# Patient Record
Sex: MALE | Race: WHITE | Employment: FULL TIME | URBAN - METROPOLITAN AREA
[De-identification: names, ages, dates, MRNs, and addresses within clinical notes are randomized per-mention and may not be internally consistent; named-entity substitution may affect disease eponyms.]

---

## 2017-02-27 DIAGNOSIS — I10 ESSENTIAL HYPERTENSION: ICD-10-CM

## 2017-02-28 RX ORDER — HYDROCHLOROTHIAZIDE 25 MG/1
TABLET ORAL
Qty: 90 TABLET | Refills: 0 | Status: SHIPPED | OUTPATIENT
Start: 2017-02-28 | End: 2017-04-03 | Stop reason: SDUPTHER

## 2017-03-20 RX ORDER — INSULIN LISPRO 100 [IU]/ML
INJECTION, SUSPENSION SUBCUTANEOUS
Qty: 30 ML | Refills: 2 | Status: SHIPPED | OUTPATIENT
Start: 2017-03-20 | End: 2017-05-12 | Stop reason: SDUPTHER

## 2017-04-03 DIAGNOSIS — I10 ESSENTIAL HYPERTENSION: ICD-10-CM

## 2017-04-03 RX ORDER — HYDROCHLOROTHIAZIDE 25 MG/1
TABLET ORAL
Qty: 90 TABLET | Refills: 1 | Status: SHIPPED | OUTPATIENT
Start: 2017-04-03 | End: 2017-06-02 | Stop reason: SDUPTHER

## 2017-05-12 ENCOUNTER — OFFICE VISIT (OUTPATIENT)
Dept: SURGERY | Age: 52
End: 2017-05-12

## 2017-05-12 VITALS
HEIGHT: 74 IN | WEIGHT: 245 LBS | SYSTOLIC BLOOD PRESSURE: 154 MMHG | DIASTOLIC BLOOD PRESSURE: 98 MMHG | HEART RATE: 109 BPM | BODY MASS INDEX: 31.44 KG/M2

## 2017-05-12 DIAGNOSIS — I10 ESSENTIAL HYPERTENSION: ICD-10-CM

## 2017-05-12 DIAGNOSIS — M10.9 GOUT, UNSPECIFIED CAUSE, UNSPECIFIED CHRONICITY, UNSPECIFIED SITE: ICD-10-CM

## 2017-05-12 DIAGNOSIS — E78.49 OTHER HYPERLIPIDEMIA: Primary | ICD-10-CM

## 2017-05-12 LAB — GLUCOSE BLD-MCNC: 338 MG/DL

## 2017-05-12 PROCEDURE — 3017F COLORECTAL CA SCREEN DOC REV: CPT | Performed by: INTERNAL MEDICINE

## 2017-05-12 PROCEDURE — 1036F TOBACCO NON-USER: CPT | Performed by: INTERNAL MEDICINE

## 2017-05-12 PROCEDURE — 82962 GLUCOSE BLOOD TEST: CPT | Performed by: INTERNAL MEDICINE

## 2017-05-12 PROCEDURE — G8427 DOCREV CUR MEDS BY ELIG CLIN: HCPCS | Performed by: INTERNAL MEDICINE

## 2017-05-12 PROCEDURE — 3045F PR MOST RECENT HEMOGLOBIN A1C LEVEL 7.0-9.0%: CPT | Performed by: INTERNAL MEDICINE

## 2017-05-12 PROCEDURE — 99214 OFFICE O/P EST MOD 30 MIN: CPT | Performed by: INTERNAL MEDICINE

## 2017-05-12 PROCEDURE — G8417 CALC BMI ABV UP PARAM F/U: HCPCS | Performed by: INTERNAL MEDICINE

## 2017-05-12 RX ORDER — OMEGA-3-ACID ETHYL ESTERS 1 G/1
2 CAPSULE, LIQUID FILLED ORAL 2 TIMES DAILY
Qty: 360 CAPSULE | Refills: 3 | Status: SHIPPED | OUTPATIENT
Start: 2017-05-12 | End: 2019-01-04 | Stop reason: SDUPTHER

## 2017-05-12 RX ORDER — INSULIN LISPRO 100 [IU]/ML
INJECTION, SUSPENSION SUBCUTANEOUS
Qty: 90 PEN | Refills: 3 | Status: SHIPPED | OUTPATIENT
Start: 2017-05-12 | End: 2017-07-14 | Stop reason: SDUPTHER

## 2017-05-12 ASSESSMENT — ENCOUNTER SYMPTOMS: EYES NEGATIVE: 1

## 2017-06-02 DIAGNOSIS — I10 ESSENTIAL HYPERTENSION: ICD-10-CM

## 2017-06-02 RX ORDER — BENAZEPRIL HYDROCHLORIDE 20 MG/1
TABLET ORAL
Qty: 90 TABLET | Refills: 0 | Status: SHIPPED | OUTPATIENT
Start: 2017-06-02 | End: 2017-09-08 | Stop reason: SDUPTHER

## 2017-06-02 RX ORDER — HYDROCHLOROTHIAZIDE 25 MG/1
TABLET ORAL
Qty: 90 TABLET | Refills: 0 | Status: SHIPPED | OUTPATIENT
Start: 2017-06-02 | End: 2017-08-31 | Stop reason: SDUPTHER

## 2017-06-02 RX ORDER — ATORVASTATIN CALCIUM 40 MG/1
TABLET, FILM COATED ORAL
Qty: 90 TABLET | Refills: 1 | Status: SHIPPED | OUTPATIENT
Start: 2017-06-02 | End: 2017-08-31 | Stop reason: SDUPTHER

## 2017-07-14 RX ORDER — INSULIN LISPRO 100 [IU]/ML
INJECTION, SUSPENSION SUBCUTANEOUS
Qty: 90 ML | Refills: 3 | Status: SHIPPED | OUTPATIENT
Start: 2017-07-14 | End: 2019-01-04 | Stop reason: SDUPTHER

## 2017-08-31 DIAGNOSIS — I10 ESSENTIAL HYPERTENSION: ICD-10-CM

## 2017-08-31 RX ORDER — ATORVASTATIN CALCIUM 40 MG/1
TABLET, FILM COATED ORAL
Qty: 90 TABLET | Refills: 0 | Status: SHIPPED | OUTPATIENT
Start: 2017-08-31 | End: 2017-12-13 | Stop reason: SDUPTHER

## 2017-08-31 RX ORDER — HYDROCHLOROTHIAZIDE 25 MG/1
TABLET ORAL
Qty: 90 TABLET | Refills: 0 | Status: SHIPPED | OUTPATIENT
Start: 2017-08-31 | End: 2017-12-13 | Stop reason: SDUPTHER

## 2017-12-13 DIAGNOSIS — I10 ESSENTIAL HYPERTENSION: ICD-10-CM

## 2017-12-13 RX ORDER — ATORVASTATIN CALCIUM 40 MG/1
TABLET, FILM COATED ORAL
Qty: 90 TABLET | Refills: 1 | Status: SHIPPED | OUTPATIENT
Start: 2017-12-13 | End: 2018-06-15 | Stop reason: SDUPTHER

## 2017-12-13 RX ORDER — HYDROCHLOROTHIAZIDE 25 MG/1
TABLET ORAL
Qty: 90 TABLET | Refills: 1 | Status: SHIPPED | OUTPATIENT
Start: 2017-12-13 | End: 2018-06-15 | Stop reason: SDUPTHER

## 2018-06-15 DIAGNOSIS — I10 ESSENTIAL HYPERTENSION: ICD-10-CM

## 2018-06-15 RX ORDER — HYDROCHLOROTHIAZIDE 25 MG/1
TABLET ORAL
Qty: 90 TABLET | Refills: 1 | Status: SHIPPED | OUTPATIENT
Start: 2018-06-15 | End: 2019-01-04 | Stop reason: SDUPTHER

## 2018-06-15 RX ORDER — ATORVASTATIN CALCIUM 40 MG/1
TABLET, FILM COATED ORAL
Qty: 90 TABLET | Refills: 1 | Status: SHIPPED | OUTPATIENT
Start: 2018-06-15 | End: 2019-01-04 | Stop reason: SDUPTHER

## 2018-06-24 DIAGNOSIS — I10 ESSENTIAL HYPERTENSION: ICD-10-CM

## 2018-06-25 RX ORDER — BENAZEPRIL HYDROCHLORIDE 20 MG/1
TABLET ORAL
Qty: 90 TABLET | Refills: 0 | Status: SHIPPED | OUTPATIENT
Start: 2018-06-25 | End: 2019-01-04 | Stop reason: SDUPTHER

## 2019-01-04 ENCOUNTER — OFFICE VISIT (OUTPATIENT)
Dept: ENDOCRINOLOGY | Age: 54
End: 2019-01-04
Payer: COMMERCIAL

## 2019-01-04 VITALS
HEIGHT: 74 IN | OXYGEN SATURATION: 96 % | SYSTOLIC BLOOD PRESSURE: 145 MMHG | DIASTOLIC BLOOD PRESSURE: 88 MMHG | HEART RATE: 94 BPM | BODY MASS INDEX: 31.57 KG/M2 | WEIGHT: 246 LBS

## 2019-01-04 DIAGNOSIS — I10 ESSENTIAL HYPERTENSION: ICD-10-CM

## 2019-01-04 DIAGNOSIS — E78.49 OTHER HYPERLIPIDEMIA: Primary | ICD-10-CM

## 2019-01-04 DIAGNOSIS — M10.9 GOUT, UNSPECIFIED CAUSE, UNSPECIFIED CHRONICITY, UNSPECIFIED SITE: ICD-10-CM

## 2019-01-04 LAB
GLUCOSE BLD-MCNC: 326 MG/DL
HBA1C MFR BLD: 14 %

## 2019-01-04 PROCEDURE — 83036 HEMOGLOBIN GLYCOSYLATED A1C: CPT | Performed by: INTERNAL MEDICINE

## 2019-01-04 PROCEDURE — 99214 OFFICE O/P EST MOD 30 MIN: CPT | Performed by: INTERNAL MEDICINE

## 2019-01-04 PROCEDURE — 82962 GLUCOSE BLOOD TEST: CPT | Performed by: INTERNAL MEDICINE

## 2019-01-04 RX ORDER — OMEGA-3-ACID ETHYL ESTERS 1 G/1
2 CAPSULE, LIQUID FILLED ORAL 2 TIMES DAILY
Qty: 360 CAPSULE | Refills: 3 | Status: SHIPPED | OUTPATIENT
Start: 2019-01-04

## 2019-01-04 RX ORDER — BENAZEPRIL HYDROCHLORIDE 20 MG/1
20 TABLET ORAL DAILY
Qty: 30 TABLET | Refills: 3 | Status: CANCELLED | OUTPATIENT
Start: 2019-01-04 | End: 2020-01-04

## 2019-01-04 RX ORDER — ALLOPURINOL 300 MG/1
300 TABLET ORAL DAILY
Qty: 90 TABLET | Refills: 1 | Status: SHIPPED | OUTPATIENT
Start: 2019-01-04 | End: 2019-06-28 | Stop reason: SDUPTHER

## 2019-01-04 RX ORDER — HYDROCHLOROTHIAZIDE 25 MG/1
TABLET ORAL
Qty: 90 TABLET | Refills: 1 | Status: SHIPPED | OUTPATIENT
Start: 2019-01-04 | End: 2019-07-12 | Stop reason: SDUPTHER

## 2019-01-04 RX ORDER — INSULIN LISPRO 100 [IU]/ML
INJECTION, SUSPENSION SUBCUTANEOUS
Qty: 90 ML | Refills: 3 | Status: SHIPPED | OUTPATIENT
Start: 2019-01-04 | End: 2019-05-24 | Stop reason: SDUPTHER

## 2019-01-04 RX ORDER — HYDROCHLOROTHIAZIDE 25 MG/1
25 TABLET ORAL DAILY
Qty: 30 TABLET | Refills: 3 | Status: SHIPPED | OUTPATIENT
Start: 2019-01-04 | End: 2021-06-04 | Stop reason: SDUPTHER

## 2019-01-04 RX ORDER — ATORVASTATIN CALCIUM 40 MG/1
TABLET, FILM COATED ORAL
Qty: 90 TABLET | Refills: 1 | Status: SHIPPED | OUTPATIENT
Start: 2019-01-04 | End: 2019-07-12 | Stop reason: SDUPTHER

## 2019-01-04 RX ORDER — BENAZEPRIL HYDROCHLORIDE 20 MG/1
TABLET ORAL
Qty: 90 TABLET | Refills: 3 | Status: SHIPPED | OUTPATIENT
Start: 2019-01-04 | End: 2020-01-08

## 2019-05-24 ENCOUNTER — OFFICE VISIT (OUTPATIENT)
Dept: ENDOCRINOLOGY | Age: 54
End: 2019-05-24
Payer: COMMERCIAL

## 2019-05-24 VITALS
DIASTOLIC BLOOD PRESSURE: 78 MMHG | BODY MASS INDEX: 30.93 KG/M2 | HEART RATE: 114 BPM | HEIGHT: 74 IN | WEIGHT: 241 LBS | SYSTOLIC BLOOD PRESSURE: 115 MMHG

## 2019-05-24 DIAGNOSIS — R53.83 FATIGUE, UNSPECIFIED TYPE: ICD-10-CM

## 2019-05-24 LAB
CHP ED QC CHECK: NORMAL
GLUCOSE BLD-MCNC: 215 MG/DL
HBA1C MFR BLD: 10.2 %

## 2019-05-24 PROCEDURE — 3017F COLORECTAL CA SCREEN DOC REV: CPT | Performed by: INTERNAL MEDICINE

## 2019-05-24 PROCEDURE — 2022F DILAT RTA XM EVC RTNOPTHY: CPT | Performed by: INTERNAL MEDICINE

## 2019-05-24 PROCEDURE — 83036 HEMOGLOBIN GLYCOSYLATED A1C: CPT | Performed by: INTERNAL MEDICINE

## 2019-05-24 PROCEDURE — 4004F PT TOBACCO SCREEN RCVD TLK: CPT | Performed by: INTERNAL MEDICINE

## 2019-05-24 PROCEDURE — 99213 OFFICE O/P EST LOW 20 MIN: CPT | Performed by: INTERNAL MEDICINE

## 2019-05-24 PROCEDURE — 82962 GLUCOSE BLOOD TEST: CPT | Performed by: INTERNAL MEDICINE

## 2019-05-24 PROCEDURE — G8427 DOCREV CUR MEDS BY ELIG CLIN: HCPCS | Performed by: INTERNAL MEDICINE

## 2019-05-24 PROCEDURE — G8417 CALC BMI ABV UP PARAM F/U: HCPCS | Performed by: INTERNAL MEDICINE

## 2019-05-24 PROCEDURE — 3046F HEMOGLOBIN A1C LEVEL >9.0%: CPT | Performed by: INTERNAL MEDICINE

## 2019-05-24 RX ORDER — INSULIN LISPRO 100 [IU]/ML
INJECTION, SUSPENSION SUBCUTANEOUS
Qty: 90 ML | Refills: 3
Start: 2019-05-24 | End: 2019-08-26 | Stop reason: SDUPTHER

## 2019-05-24 RX ORDER — GABAPENTIN 300 MG/1
300 CAPSULE ORAL 3 TIMES DAILY
Qty: 90 CAPSULE | Refills: 3 | Status: SHIPPED | OUTPATIENT
Start: 2019-05-24 | End: 2019-08-03 | Stop reason: SDUPTHER

## 2019-06-02 NOTE — PROGRESS NOTES
Subjective:      Patient ID: Ana Maria Juarez is a 49 y.o. male. 3 month f/u on diabetes   Diabetes   He presents for his follow-up diabetic visit. He has type 2 diabetes mellitus. Associated symptoms include fatigue. Diabetic complications include peripheral neuropathy. Current diabetic treatment includes insulin injections (75/25 humalog plus  metfromin ). He is currently taking insulin pre-breakfast, pre-dinner and pre-lunch. His overall blood glucose range is >200 mg/dl. (  Lab Results       Component                Value               Date                       LABA1C                   10.2                05/24/2019            ) An ACE inhibitor/angiotensin II receptor blocker is being taken. C/o neuropathic pain   C/o fatigue wants testosterone  level checked     Patient Active Problem List   Diagnosis    Type II diabetes mellitus, uncontrolled ([facility] [address]    Hypertension    Hyperlipidemia    Focal nodular hyperplasia of liver    Gout     Allergies   Allergen Reactions    Dye [Barium-Containing Compounds] Hives             Current Outpatient Medications:     insulin lispro protamine & lispro (HUMALOG MIX 75/25 KWIKPEN) (75-25) 100 UNIT per ML SUPN injection pen, 50 units am 30 units lunch and 50 units at dinner, Disp: 90 mL, Rfl: 3    gabapentin (NEURONTIN) 300 MG capsule, Take 1 capsule by mouth 3 times daily for 180 days.  Intended supply: 30 days, Disp: 90 capsule, Rfl: 3    atorvastatin (LIPITOR) 40 MG tablet, TAKE 1 TABLET DAILY, Disp: 90 tablet, Rfl: 1    omega-3 acid ethyl esters (LOVAZA) 1 g capsule, Take 2 capsules by mouth 2 times daily, Disp: 360 capsule, Rfl: 3    allopurinol (ZYLOPRIM) 300 MG tablet, Take 1 tablet by mouth daily, Disp: 90 tablet, Rfl: 1    Insulin Pen Needle (NOVOFINE) 32G X 6 MM MISC, Use twice daily with insulin, Disp: 300 each, Rfl: 3    hydrochlorothiazide (HYDRODIURIL) 25 MG tablet, Take 1 tablet by mouth daily, Disp: 30 tablet, Rfl: 3    benazepril (LOTENSIN) 20 MG Status:   Future     Standing Expiration Date:   2020    Basic Metabolic Panel     Standing Status:   Future     Standing Expiration Date:   2020    POCT Glucose    POCT glycosylated hemoglobin (Hb A1C)     increase insulin dose   Orders Placed This Encounter   Medications    insulin lispro protamine & lispro (HUMALOG MIX 75/25 KWIKPEN) (75-25) 100 UNIT per ML SUPN injection pen     Si units am 30 units lunch and 50 units at dinner     Dispense:  90 mL     Refill:  3    gabapentin (NEURONTIN) 300 MG capsule     Sig: Take 1 capsule by mouth 3 times daily for 180 days.  Intended supply: 30 days     Dispense:  90 capsule     Refill:  3

## 2019-06-28 RX ORDER — ALLOPURINOL 300 MG/1
TABLET ORAL
Qty: 90 TABLET | Refills: 1 | Status: SHIPPED | OUTPATIENT
Start: 2019-06-28 | End: 2020-01-08

## 2019-07-12 DIAGNOSIS — I10 ESSENTIAL HYPERTENSION: ICD-10-CM

## 2019-07-12 RX ORDER — HYDROCHLOROTHIAZIDE 25 MG/1
TABLET ORAL
Qty: 90 TABLET | Refills: 1 | Status: SHIPPED | OUTPATIENT
Start: 2019-07-12 | End: 2020-01-08

## 2019-07-12 RX ORDER — ATORVASTATIN CALCIUM 40 MG/1
TABLET, FILM COATED ORAL
Qty: 90 TABLET | Refills: 1 | Status: SHIPPED | OUTPATIENT
Start: 2019-07-12 | End: 2020-01-08

## 2019-08-05 RX ORDER — GABAPENTIN 300 MG/1
CAPSULE ORAL
Qty: 90 CAPSULE | Refills: 2 | Status: SHIPPED | OUTPATIENT
Start: 2019-08-05 | End: 2019-11-30 | Stop reason: SDUPTHER

## 2019-08-26 ENCOUNTER — OFFICE VISIT (OUTPATIENT)
Dept: ENDOCRINOLOGY | Age: 54
End: 2019-08-26
Payer: COMMERCIAL

## 2019-08-26 VITALS
DIASTOLIC BLOOD PRESSURE: 87 MMHG | SYSTOLIC BLOOD PRESSURE: 132 MMHG | HEIGHT: 74 IN | BODY MASS INDEX: 31.18 KG/M2 | HEART RATE: 92 BPM | WEIGHT: 243 LBS

## 2019-08-26 DIAGNOSIS — R53.83 FATIGUE, UNSPECIFIED TYPE: ICD-10-CM

## 2019-08-26 DIAGNOSIS — E29.1 HYPOGONADISM IN MALE: ICD-10-CM

## 2019-08-26 LAB
ANION GAP SERPL CALCULATED.3IONS-SCNC: 15 MEQ/L (ref 9–15)
BUN BLDV-MCNC: 14 MG/DL (ref 6–20)
CALCIUM SERPL-MCNC: 10 MG/DL (ref 8.5–9.9)
CHLORIDE BLD-SCNC: 95 MEQ/L (ref 95–107)
CHP ED QC CHECK: NORMAL
CO2: 26 MEQ/L (ref 20–31)
CREAT SERPL-MCNC: 0.74 MG/DL (ref 0.7–1.2)
CREATININE URINE: 86.2 MG/DL
GFR AFRICAN AMERICAN: >60
GFR NON-AFRICAN AMERICAN: >60
GLUCOSE BLD-MCNC: 251 MG/DL
GLUCOSE BLD-MCNC: 251 MG/DL (ref 70–99)
HBA1C MFR BLD: 11 % (ref 4.8–5.9)
HBA1C MFR BLD: 11.6 %
MICROALBUMIN UR-MCNC: 2 MG/DL
MICROALBUMIN/CREAT UR-RTO: 23.2 MG/G (ref 0–30)
POTASSIUM SERPL-SCNC: 3.9 MEQ/L (ref 3.4–4.9)
SODIUM BLD-SCNC: 136 MEQ/L (ref 135–144)

## 2019-08-26 PROCEDURE — 3017F COLORECTAL CA SCREEN DOC REV: CPT | Performed by: INTERNAL MEDICINE

## 2019-08-26 PROCEDURE — 3046F HEMOGLOBIN A1C LEVEL >9.0%: CPT | Performed by: INTERNAL MEDICINE

## 2019-08-26 PROCEDURE — 99213 OFFICE O/P EST LOW 20 MIN: CPT | Performed by: INTERNAL MEDICINE

## 2019-08-26 PROCEDURE — G8427 DOCREV CUR MEDS BY ELIG CLIN: HCPCS | Performed by: INTERNAL MEDICINE

## 2019-08-26 PROCEDURE — 2022F DILAT RTA XM EVC RTNOPTHY: CPT | Performed by: INTERNAL MEDICINE

## 2019-08-26 PROCEDURE — 4004F PT TOBACCO SCREEN RCVD TLK: CPT | Performed by: INTERNAL MEDICINE

## 2019-08-26 PROCEDURE — 82962 GLUCOSE BLOOD TEST: CPT | Performed by: INTERNAL MEDICINE

## 2019-08-26 PROCEDURE — 83036 HEMOGLOBIN GLYCOSYLATED A1C: CPT | Performed by: INTERNAL MEDICINE

## 2019-08-26 PROCEDURE — G8417 CALC BMI ABV UP PARAM F/U: HCPCS | Performed by: INTERNAL MEDICINE

## 2019-08-26 RX ORDER — INSULIN LISPRO 100 [IU]/ML
INJECTION, SUSPENSION SUBCUTANEOUS
Qty: 90 ML | Refills: 3 | Status: SHIPPED | OUTPATIENT
Start: 2019-08-26 | End: 2020-02-12 | Stop reason: SDUPTHER

## 2019-08-27 LAB
SEX HORMONE BINDING GLOBULIN: 17 NMOL/L (ref 11–80)
TESTOSTERONE FREE PERCENT: 2.3 % (ref 1.6–2.9)
TESTOSTERONE FREE, CALC: 32 PG/ML (ref 47–244)
TESTOSTERONE TOTAL-MALE: 137 NG/DL (ref 300–890)

## 2019-08-31 PROBLEM — E29.1 HYPOGONADISM IN MALE: Status: ACTIVE | Noted: 2019-08-31

## 2019-09-03 ENCOUNTER — TELEPHONE (OUTPATIENT)
Dept: ENDOCRINOLOGY | Age: 54
End: 2019-09-03

## 2019-09-03 DIAGNOSIS — E29.1 HYPOGONADISM IN MALE: Primary | ICD-10-CM

## 2019-09-03 RX ORDER — TESTOSTERONE 16.2 MG/G
GEL TRANSDERMAL
Qty: 1 BOTTLE | Refills: 3 | Status: SHIPPED | OUTPATIENT
Start: 2019-09-03 | End: 2019-10-01 | Stop reason: SDUPTHER

## 2019-09-17 ENCOUNTER — TELEPHONE (OUTPATIENT)
Dept: ENDOCRINOLOGY | Age: 54
End: 2019-09-17

## 2019-09-30 ENCOUNTER — TELEPHONE (OUTPATIENT)
Dept: ENDOCRINOLOGY | Age: 54
End: 2019-09-30

## 2019-10-01 DIAGNOSIS — E29.1 HYPOGONADISM IN MALE: ICD-10-CM

## 2019-10-01 RX ORDER — TESTOSTERONE 16.2 MG/G
GEL TRANSDERMAL
Qty: 1 BOTTLE | Refills: 3 | Status: SHIPPED | OUTPATIENT
Start: 2019-10-01 | End: 2021-03-05 | Stop reason: SDUPTHER

## 2019-10-07 ENCOUNTER — TELEPHONE (OUTPATIENT)
Dept: ENDOCRINOLOGY | Age: 54
End: 2019-10-07

## 2019-10-08 DIAGNOSIS — E29.1 HYPOGONADISM IN MALE: ICD-10-CM

## 2019-10-09 RX ORDER — TESTOSTERONE GEL, 1% 10 MG/G
2 GEL TRANSDERMAL DAILY
Qty: 60 TUBE | Refills: 3 | Status: SHIPPED | OUTPATIENT
Start: 2019-10-09 | End: 2021-10-12 | Stop reason: SDUPTHER

## 2019-12-02 RX ORDER — GABAPENTIN 300 MG/1
CAPSULE ORAL
Qty: 90 CAPSULE | Refills: 2 | Status: SHIPPED | OUTPATIENT
Start: 2019-12-02 | End: 2020-04-06 | Stop reason: SDUPTHER

## 2020-01-08 RX ORDER — BENAZEPRIL HYDROCHLORIDE 20 MG/1
TABLET ORAL
Qty: 90 TABLET | Refills: 1 | Status: SHIPPED | OUTPATIENT
Start: 2020-01-08

## 2020-01-08 RX ORDER — ALLOPURINOL 300 MG/1
TABLET ORAL
Qty: 90 TABLET | Refills: 1 | Status: SHIPPED | OUTPATIENT
Start: 2020-01-08 | End: 2020-07-08

## 2020-01-08 RX ORDER — HYDROCHLOROTHIAZIDE 25 MG/1
TABLET ORAL
Qty: 90 TABLET | Refills: 1 | Status: SHIPPED | OUTPATIENT
Start: 2020-01-08 | End: 2020-07-08

## 2020-01-08 RX ORDER — ATORVASTATIN CALCIUM 40 MG/1
TABLET, FILM COATED ORAL
Qty: 90 TABLET | Refills: 1 | Status: SHIPPED | OUTPATIENT
Start: 2020-01-08 | End: 2020-07-13

## 2020-02-12 RX ORDER — INSULIN LISPRO 100 [IU]/ML
INJECTION, SUSPENSION SUBCUTANEOUS
Qty: 90 ML | Refills: 3 | Status: SHIPPED | OUTPATIENT
Start: 2020-02-12 | End: 2020-09-04 | Stop reason: SDUPTHER

## 2020-04-06 RX ORDER — GABAPENTIN 300 MG/1
300 CAPSULE ORAL 3 TIMES DAILY
Qty: 90 CAPSULE | Refills: 2 | Status: SHIPPED | OUTPATIENT
Start: 2020-04-06 | End: 2020-09-04 | Stop reason: SDUPTHER

## 2020-07-08 RX ORDER — HYDROCHLOROTHIAZIDE 25 MG/1
TABLET ORAL
Qty: 90 TABLET | Refills: 1 | Status: SHIPPED | OUTPATIENT
Start: 2020-07-08 | End: 2022-06-15

## 2020-07-08 RX ORDER — ALLOPURINOL 300 MG/1
TABLET ORAL
Qty: 90 TABLET | Refills: 1 | Status: SHIPPED | OUTPATIENT
Start: 2020-07-08 | End: 2021-02-24 | Stop reason: SDUPTHER

## 2020-07-13 RX ORDER — ATORVASTATIN CALCIUM 40 MG/1
TABLET, FILM COATED ORAL
Qty: 90 TABLET | Refills: 1 | Status: SHIPPED | OUTPATIENT
Start: 2020-07-13 | End: 2021-01-11

## 2020-09-03 RX ORDER — GABAPENTIN 300 MG/1
CAPSULE ORAL
Qty: 270 CAPSULE | Refills: 0 | OUTPATIENT
Start: 2020-09-03

## 2020-09-04 ENCOUNTER — OFFICE VISIT (OUTPATIENT)
Dept: ENDOCRINOLOGY | Age: 55
End: 2020-09-04
Payer: COMMERCIAL

## 2020-09-04 VITALS
HEART RATE: 100 BPM | OXYGEN SATURATION: 95 % | BODY MASS INDEX: 32.35 KG/M2 | DIASTOLIC BLOOD PRESSURE: 88 MMHG | SYSTOLIC BLOOD PRESSURE: 127 MMHG | WEIGHT: 252 LBS

## 2020-09-04 LAB
CHP ED QC CHECK: NORMAL
GLUCOSE BLD-MCNC: 220 MG/DL
HBA1C MFR BLD: 9.8 %

## 2020-09-04 PROCEDURE — G8427 DOCREV CUR MEDS BY ELIG CLIN: HCPCS | Performed by: INTERNAL MEDICINE

## 2020-09-04 PROCEDURE — 3046F HEMOGLOBIN A1C LEVEL >9.0%: CPT | Performed by: INTERNAL MEDICINE

## 2020-09-04 PROCEDURE — 83036 HEMOGLOBIN GLYCOSYLATED A1C: CPT | Performed by: INTERNAL MEDICINE

## 2020-09-04 PROCEDURE — 1036F TOBACCO NON-USER: CPT | Performed by: INTERNAL MEDICINE

## 2020-09-04 PROCEDURE — 3017F COLORECTAL CA SCREEN DOC REV: CPT | Performed by: INTERNAL MEDICINE

## 2020-09-04 PROCEDURE — 82962 GLUCOSE BLOOD TEST: CPT | Performed by: INTERNAL MEDICINE

## 2020-09-04 PROCEDURE — 99213 OFFICE O/P EST LOW 20 MIN: CPT | Performed by: INTERNAL MEDICINE

## 2020-09-04 PROCEDURE — 2022F DILAT RTA XM EVC RTNOPTHY: CPT | Performed by: INTERNAL MEDICINE

## 2020-09-04 PROCEDURE — G8417 CALC BMI ABV UP PARAM F/U: HCPCS | Performed by: INTERNAL MEDICINE

## 2020-09-04 RX ORDER — INSULIN LISPRO 100 [IU]/ML
INJECTION, SUSPENSION SUBCUTANEOUS
Qty: 90 ML | Refills: 3 | Status: SHIPPED | OUTPATIENT
Start: 2020-09-04 | End: 2021-06-04

## 2020-09-04 RX ORDER — GABAPENTIN 300 MG/1
300 CAPSULE ORAL 3 TIMES DAILY
Qty: 90 CAPSULE | Refills: 2 | Status: SHIPPED | OUTPATIENT
Start: 2020-09-04 | End: 2020-12-11

## 2020-09-04 RX ORDER — TESTOSTERONE ENANTHATE 75 MG/.5ML
INJECTION SUBCUTANEOUS
Qty: 4 PEN | Refills: 5 | Status: SHIPPED | OUTPATIENT
Start: 2020-09-04 | End: 2021-06-04

## 2020-09-04 NOTE — PROGRESS NOTES
Subjective:      Patient ID: Francisco Bedoya is a 48 y.o. male. 12-month follow-up on type 2 diabetes patient on 75/25 Humalog 3 times daily   Diabetes   He presents for his follow-up diabetic visit. He has type 2 diabetes mellitus. Symptoms are improving. Current diabetic treatment includes insulin injections (75/25 Humalog ). He is currently taking insulin pre-breakfast, pre-lunch and pre-dinner. His overall blood glucose range is 180-200 mg/dl. (Lab Results       Component                Value               Date                       LABA1C                   9.8                 09/04/2020              )      Hypogonadism last testosterone level was low on gels      Results for Van Conway (MRN [id number]) as of 9/8/2020 00:11   Ref. Range 8/26/2019 10:59   Sex Hormone Binding Latest Ref Range: 11 - 80 nmol/L 17   Testosterone Free, Calc Latest Ref Range: 47 - 244 pg/mL 32 (L)   Total Testosterone Latest Ref Range: 300 - 890 ng/dL 137 (L)   Testosterone % Free Latest Ref Range: 1.6 - 2.9 % 2.3       Results for Van Conway (MRN [id number]) as of 9/4/2020 14:01   Ref.  Range 8/26/2019 10:07 8/26/2019 10:59 8/26/2019 11:00 9/4/2020 13:46 9/4/2020 13:54   Hemoglobin A1C Latest Units: % 11.6 11.0 (H)   9.8     Patient Active Problem List   Diagnosis    Type II diabetes mellitus, uncontrolled ([facility] [place] [address]    Hypertension    Hyperlipidemia    Focal nodular hyperplasia of liver    Gout    Hypogonadism in male     Allergies   Allergen Reactions    Dye [Barium-Containing Compounds] Hives       Current Outpatient Medications:     atorvastatin (LIPITOR) 40 MG tablet, TAKE 1 TABLET BY MOUTH EVERY DAY, Disp: 90 tablet, Rfl: 1    allopurinol (ZYLOPRIM) 300 MG tablet, TAKE 1 TABLET BY MOUTH EVERY DAY, Disp: 90 tablet, Rfl: 1    hydroCHLOROthiazide (HYDRODIURIL) 25 MG tablet, TAKE 1 TABLET BY MOUTH EVERY DAY, Disp: 90 tablet, Rfl: 1    insulin lispro protamine & lispro (HUMALOG MIX 75/25 KWIKPEN) (75-25) 100 UNIT per ML SUPN injection pen, 60 units am 30 units lunch and 60 units at dinner, Disp: 90 mL, Rfl: 3    benazepril (LOTENSIN) 20 MG tablet, TAKE 1 TABLET BY MOUTH EVERY DAY, Disp: 90 tablet, Rfl: 1    Insulin Pen Needle (NOVOFINE) 32G X 6 MM MISC, Use twice daily with insulin, Disp: 300 each, Rfl: 3    carvedilol (COREG) 6.25 MG tablet, Take 6.25 mg by mouth 2 times daily (with meals). , Disp: , Rfl:     gabapentin (NEURONTIN) 300 MG capsule, Take 1 capsule by mouth 3 times daily for 122 days. , Disp: 90 capsule, Rfl: 2    testosterone (ANDROGEL; TESTIM) 50 MG/5GM (1%) GEL 1% gel, Apply 2 Tubes topically daily for 30 days. , Disp: 60 Tube, Rfl: 3    Testosterone (ANDROGEL PUMP) 20.25 MG/ACT (1.62%) GEL gel, Apply 2 pump depressions daily, Disp: 1 Bottle, Rfl: 3    omega-3 acid ethyl esters (LOVAZA) 1 g capsule, Take 2 capsules by mouth 2 times daily (Patient not taking: Reported on 9/4/2020), Disp: 360 capsule, Rfl: 3    hydrochlorothiazide (HYDRODIURIL) 25 MG tablet, Take 1 tablet by mouth daily, Disp: 30 tablet, Rfl: 3    benazepril (LOTENSIN) 20 MG tablet, Take 1 tablet by mouth daily, Disp: 30 tablet, Rfl: 3      Review of Systems    Vitals:    09/04/20 1348   BP: 127/88   Pulse: 100   SpO2: 95%   Weight: 252 lb (114.3 kg)       Objective:   Physical Exam  Constitutional:       Appearance: Normal appearance. He is obese. HENT:      Head: Normocephalic and atraumatic. Neck:      Musculoskeletal: Normal range of motion and neck supple. Cardiovascular:      Rate and Rhythm: Normal rate. Musculoskeletal: Normal range of motion. Neurological:      Mental Status: He is alert. Psychiatric:         Mood and Affect: Mood normal.         Assessment:      .   Diagnosis Orders   1.  Uncontrolled type 2 diabetes mellitus with complication, with long-term current use of insulin ([facility])  POCT Glucose    POCT glycosylated hemoglobin (Hb A1C)    Basic Metabolic Panel    Lipid Panel    Microalbumin / Creatinine Urine Ratio

## 2020-12-11 RX ORDER — GABAPENTIN 300 MG/1
CAPSULE ORAL
Qty: 90 CAPSULE | Refills: 2 | Status: SHIPPED | OUTPATIENT
Start: 2020-12-11 | End: 2021-03-17 | Stop reason: SDUPTHER

## 2021-01-11 RX ORDER — ATORVASTATIN CALCIUM 40 MG/1
TABLET, FILM COATED ORAL
Qty: 90 TABLET | Refills: 1 | Status: SHIPPED | OUTPATIENT
Start: 2021-01-11 | End: 2021-07-12

## 2021-02-25 RX ORDER — ALLOPURINOL 300 MG/1
TABLET ORAL
Qty: 90 TABLET | Refills: 1 | Status: SHIPPED | OUTPATIENT
Start: 2021-02-25

## 2021-03-05 ENCOUNTER — OFFICE VISIT (OUTPATIENT)
Dept: ENDOCRINOLOGY | Age: 56
End: 2021-03-05
Payer: COMMERCIAL

## 2021-03-05 VITALS
OXYGEN SATURATION: 95 % | SYSTOLIC BLOOD PRESSURE: 116 MMHG | HEART RATE: 105 BPM | DIASTOLIC BLOOD PRESSURE: 76 MMHG | BODY MASS INDEX: 32.85 KG/M2 | WEIGHT: 256 LBS | HEIGHT: 74 IN

## 2021-03-05 DIAGNOSIS — E29.1 HYPOGONADISM IN MALE: ICD-10-CM

## 2021-03-05 LAB
CHP ED QC CHECK: NORMAL
GLUCOSE BLD-MCNC: 206 MG/DL
HBA1C MFR BLD: 9.7 %

## 2021-03-05 PROCEDURE — 1036F TOBACCO NON-USER: CPT | Performed by: INTERNAL MEDICINE

## 2021-03-05 PROCEDURE — 2022F DILAT RTA XM EVC RTNOPTHY: CPT | Performed by: INTERNAL MEDICINE

## 2021-03-05 PROCEDURE — 82962 GLUCOSE BLOOD TEST: CPT | Performed by: INTERNAL MEDICINE

## 2021-03-05 PROCEDURE — G8417 CALC BMI ABV UP PARAM F/U: HCPCS | Performed by: INTERNAL MEDICINE

## 2021-03-05 PROCEDURE — 3046F HEMOGLOBIN A1C LEVEL >9.0%: CPT | Performed by: INTERNAL MEDICINE

## 2021-03-05 PROCEDURE — 3017F COLORECTAL CA SCREEN DOC REV: CPT | Performed by: INTERNAL MEDICINE

## 2021-03-05 PROCEDURE — 83036 HEMOGLOBIN GLYCOSYLATED A1C: CPT | Performed by: INTERNAL MEDICINE

## 2021-03-05 PROCEDURE — G8427 DOCREV CUR MEDS BY ELIG CLIN: HCPCS | Performed by: INTERNAL MEDICINE

## 2021-03-05 PROCEDURE — G8484 FLU IMMUNIZE NO ADMIN: HCPCS | Performed by: INTERNAL MEDICINE

## 2021-03-05 PROCEDURE — 99203 OFFICE O/P NEW LOW 30 MIN: CPT | Performed by: INTERNAL MEDICINE

## 2021-03-05 RX ORDER — ORAL SEMAGLUTIDE 3 MG/1
TABLET ORAL
Qty: 90 TABLET | Refills: 3 | Status: SHIPPED | OUTPATIENT
Start: 2021-03-05 | End: 2022-02-23 | Stop reason: SDUPTHER

## 2021-03-05 RX ORDER — TESTOSTERONE 16.2 MG/G
GEL TRANSDERMAL
Qty: 3 BOTTLE | Refills: 3 | Status: SHIPPED | OUTPATIENT
Start: 2021-03-05 | End: 2022-03-04 | Stop reason: SDUPTHER

## 2021-03-05 NOTE — PROGRESS NOTES
Hyperlipidemia    Focal nodular hyperplasia of liver    Gout    Hypogonadism in male     Allergies   Allergen Reactions    Dye [Barium-Containing Compounds] Hives       Current Outpatient Medications:     allopurinol (ZYLOPRIM) 300 MG tablet, TAKE 1 TABLET BY MOUTH EVERY DAY, Disp: 90 tablet, Rfl: 1    atorvastatin (LIPITOR) 40 MG tablet, TAKE 1 TABLET BY MOUTH EVERY DAY, Disp: 90 tablet, Rfl: 1    Testosterone Enanthate (XYOSTED) 75 MG/0.5ML SOAJ, Once a week, Disp: 4 pen, Rfl: 5    insulin lispro protamine & lispro (HUMALOG MIX 75/25 KWIKPEN) (75-25) 100 UNIT per ML SUPN injection pen, 60 units am 30 units lunch and 60 units at dinner, Disp: 90 mL, Rfl: 3    hydroCHLOROthiazide (HYDRODIURIL) 25 MG tablet, TAKE 1 TABLET BY MOUTH EVERY DAY, Disp: 90 tablet, Rfl: 1    benazepril (LOTENSIN) 20 MG tablet, TAKE 1 TABLET BY MOUTH EVERY DAY, Disp: 90 tablet, Rfl: 1    omega-3 acid ethyl esters (LOVAZA) 1 g capsule, Take 2 capsules by mouth 2 times daily, Disp: 360 capsule, Rfl: 3    Insulin Pen Needle (NOVOFINE) 32G X 6 MM MISC, Use twice daily with insulin, Disp: 300 each, Rfl: 3    carvedilol (COREG) 6.25 MG tablet, Take 6.25 mg by mouth 2 times daily (with meals). , Disp: , Rfl:     gabapentin (NEURONTIN) 300 MG capsule, TAKE 1 CAPSULE BY MOUTH 3 TIMES DAILY, Disp: 90 capsule, Rfl: 2    testosterone (ANDROGEL; TESTIM) 50 MG/5GM (1%) GEL 1% gel, Apply 2 Tubes topically daily for 30 days. , Disp: 60 Tube, Rfl: 3    Testosterone (ANDROGEL PUMP) 20.25 MG/ACT (1.62%) GEL gel, Apply 2 pump depressions daily, Disp: 1 Bottle, Rfl: 3    hydrochlorothiazide (HYDRODIURIL) 25 MG tablet, Take 1 tablet by mouth daily, Disp: 30 tablet, Rfl: 3    benazepril (LOTENSIN) 20 MG tablet, Take 1 tablet by mouth daily, Disp: 30 tablet, Rfl: 3      Review of Systems    Vitals:    03/05/21 1350   BP: 116/76   Pulse: 105   SpO2: 95%   Weight: 256 lb (116.1 kg)   Height: 6' 2\" (1.88 m)       Objective:   Physical Exam  Vitals signs reviewed. Constitutional:       Appearance: Normal appearance. He is obese. HENT:      Head: Normocephalic and atraumatic. Right Ear: External ear normal.      Left Ear: External ear normal.   Neck:      Musculoskeletal: Normal range of motion and neck supple. Cardiovascular:      Rate and Rhythm: Tachycardia present. Musculoskeletal: Normal range of motion. Neurological:      General: No focal deficit present. Mental Status: He is alert and oriented to person, place, and time. Psychiatric:         Mood and Affect: Mood normal.         Behavior: Behavior normal.         Assessment:       Diagnosis Orders   1. Uncontrolled type 2 diabetes mellitus with complication, with long-term current use of insulin ([facility])  POCT Glucose    POCT glycosylated hemoglobin (Hb A1C)   2.  Hypogonadism in male  Testosterone (ANDROGEL PUMP) 20.25 MG/ACT (1.62%) GEL gel           Plan:       Switch to NovoLog 70/30 3 times a day and Rybelsus restart AndroGel repeat labs in 2 to 3 months time A1c goal of 7 or lower  Orders Placed This Encounter   Medications    insulin aspart protamine-insulin aspart (NOVOLOG 70/30) (70-30) 100 UNIT/ML injection     Si units am 30 units lunch and 60 units dinner     Dispense:  90 pen     Refill:  3    Semaglutide (RYBELSUS) 3 MG TABS     Sig: Once a day     Dispense:  90 tablet     Refill:  03    Insulin Pen Needle (NOVOFINE) 32G X 6 MM MISC     Sig: Tid     Dispense:  300 each     Refill:  3    Testosterone (ANDROGEL PUMP) 20.25 MG/ACT (1.62%) GEL gel     Sig: Apply 2 pump depressions daily     Dispense:  3 Bottle     Refill:  3     Orders Placed This Encounter   Procedures    POCT Glucose    POCT glycosylated hemoglobin (Hb A1C)             Jane Winn MD

## 2021-03-17 RX ORDER — GABAPENTIN 300 MG/1
CAPSULE ORAL
Qty: 90 CAPSULE | Refills: 3 | Status: SHIPPED | OUTPATIENT
Start: 2021-03-17 | End: 2021-06-04 | Stop reason: SDUPTHER

## 2021-03-29 RX ORDER — BLOOD SUGAR DIAGNOSTIC
STRIP MISCELLANEOUS
Qty: 150 EACH | Refills: 3 | Status: SHIPPED | OUTPATIENT
Start: 2021-03-29

## 2021-03-29 RX ORDER — LANCETS
EACH MISCELLANEOUS
Qty: 150 EACH | Refills: 3 | Status: SHIPPED | OUTPATIENT
Start: 2021-03-29

## 2021-06-04 ENCOUNTER — OFFICE VISIT (OUTPATIENT)
Dept: ENDOCRINOLOGY | Age: 56
End: 2021-06-04
Payer: COMMERCIAL

## 2021-06-04 VITALS
BODY MASS INDEX: 32.34 KG/M2 | HEART RATE: 90 BPM | OXYGEN SATURATION: 95 % | SYSTOLIC BLOOD PRESSURE: 105 MMHG | WEIGHT: 252 LBS | DIASTOLIC BLOOD PRESSURE: 70 MMHG | HEIGHT: 74 IN

## 2021-06-04 DIAGNOSIS — E29.1 HYPOGONADISM IN MALE: ICD-10-CM

## 2021-06-04 DIAGNOSIS — N52.9 ERECTILE DYSFUNCTION, UNSPECIFIED ERECTILE DYSFUNCTION TYPE: ICD-10-CM

## 2021-06-04 DIAGNOSIS — E11.42 DIABETIC POLYNEUROPATHY ASSOCIATED WITH TYPE 2 DIABETES MELLITUS (HCC): ICD-10-CM

## 2021-06-04 LAB
CHP ED QC CHECK: NORMAL
GLUCOSE BLD-MCNC: 196 MG/DL
HBA1C MFR BLD: 8.7 %

## 2021-06-04 PROCEDURE — 3052F HG A1C>EQUAL 8.0%<EQUAL 9.0%: CPT | Performed by: INTERNAL MEDICINE

## 2021-06-04 PROCEDURE — G8427 DOCREV CUR MEDS BY ELIG CLIN: HCPCS | Performed by: INTERNAL MEDICINE

## 2021-06-04 PROCEDURE — 99213 OFFICE O/P EST LOW 20 MIN: CPT | Performed by: INTERNAL MEDICINE

## 2021-06-04 PROCEDURE — 1036F TOBACCO NON-USER: CPT | Performed by: INTERNAL MEDICINE

## 2021-06-04 PROCEDURE — 2022F DILAT RTA XM EVC RTNOPTHY: CPT | Performed by: INTERNAL MEDICINE

## 2021-06-04 PROCEDURE — 3017F COLORECTAL CA SCREEN DOC REV: CPT | Performed by: INTERNAL MEDICINE

## 2021-06-04 PROCEDURE — 82962 GLUCOSE BLOOD TEST: CPT | Performed by: INTERNAL MEDICINE

## 2021-06-04 PROCEDURE — G8417 CALC BMI ABV UP PARAM F/U: HCPCS | Performed by: INTERNAL MEDICINE

## 2021-06-04 PROCEDURE — 83036 HEMOGLOBIN GLYCOSYLATED A1C: CPT | Performed by: INTERNAL MEDICINE

## 2021-06-04 RX ORDER — SILDENAFIL 100 MG/1
100 TABLET, FILM COATED ORAL PRN
Qty: 30 TABLET | Refills: 3 | Status: SHIPPED | OUTPATIENT
Start: 2021-06-04

## 2021-06-04 RX ORDER — GABAPENTIN 400 MG/1
CAPSULE ORAL
Qty: 90 CAPSULE | Refills: 3 | Status: SHIPPED | OUTPATIENT
Start: 2021-06-04 | End: 2021-10-01 | Stop reason: SDUPTHER

## 2021-06-04 NOTE — PROGRESS NOTES
6/4/2021    Assessment:       Diagnosis Orders   1. Uncontrolled type 2 diabetes mellitus with complication, with long-term current use of insulin ([facility])  POCT Glucose    POCT glycosylated hemoglobin (Hb A1C)    HM DIABETES FOOT EXAM    Hemoglobin J9H    Basic Metabolic Panel, Fasting   2. Hypogonadism in male  Testosterone Free and Total Male   3. Diabetic polyneuropathy associated with type 2 diabetes mellitus ([address]     4.  Erectile dysfunction, unspecified erectile dysfunction type           PLAN:     [facility] report reviewed  Will review patient's labs from [facility]  NovoLog insulin via pump pump adjustment was made  Basal rate increased from 2.35 to 2.6 units/h  Carb ratio continue with 3 sensitivity change from 16-12  Increased dose of gabapentin  [facility] time patient to call [facility] to get on the sensor CGM  Start Viagra    Orders Placed This Encounter   Medications    gabapentin (NEURONTIN) 400 MG capsule     Sig: TAKE 1 CAPSULE BY MOUTH 3 TIMES DAILY     Dispense:  90 capsule     Refill:  3    sildenafil (VIAGRA) 100 MG tablet     Sig: Take 1 tablet by mouth as needed for Erectile Dysfunction     Dispense:  30 tablet     Refill:  3     Orders Placed This Encounter   Procedures    Hemoglobin A1C     Standing Status:   Future     Standing Expiration Date:   6/4/2022    Basic Metabolic Panel, Fasting     Standing Status:   Future     Standing Expiration Date:   6/4/2022    Testosterone Free and Total Male     Standing Status:   Future     Standing Expiration Date:   6/4/2022    POCT Glucose    POCT glycosylated hemoglobin (Hb A1C)     DIABETES FOOT EXAM           Orders Placed This Encounter   Procedures    POCT Glucose    POCT glycosylated hemoglobin (Hb A1C)       Subjective:     Chief Complaint   Patient presents with    Diabetes    Hypogonadism     Vitals:    06/04/21 0915   BP: 105/70   Pulse: 90   SpO2: 95%   Weight: 252 lb (114.3 kg)   Height: 6' 2\" (1.88 m)     Wt Readings from Last 3 Encounters:   06/04/21 252 lb (114.3 kg)   03/05/21 256 lb (116.1 kg)   09/04/20 252 lb (114.3 kg)     BP Readings from Last 3 Encounters:   06/04/21 105/70   03/05/21 116/76   09/04/20 127/88     Follow-up on type 2 diabetes recent pump start overall blood sugars are improving still high average upper 100s to low 200 range no severe hypoglycemia  Patient also wants a higher dose of gabapentin for neuropathy requesting medication for erectile dysfunction labs done through [facility] for testosterone results are pending A1c is down to 8.7  Patient is waiting sensor continuous glucose monitoring  Patient also on Rybelsus in addition to the insulin via pump  Hypogonadism on AndroGel    Diabetes  He presents for his follow-up diabetic visit. He has type 2 diabetes mellitus. Symptoms are stable. Diabetic complications include peripheral neuropathy. Current diabetic treatment includes insulin pump. His overall blood glucose range is >200 mg/dl. (Lab Results       Component                Value               Date                       LABA1C                   8.7                 06/04/2021              Reviewed 2-week pump download average blood sugar 222±43  Basal rate 2.35 units/h carb ratio was 3 sensitivity was 16) An ACE inhibitor/angiotensin II receptor blocker is being taken. Past Medical History:   Diagnosis Date    Erectile dysfunction     Focal nodular hyperplasia of liver 2/13/2013    History of cardiovascular stress test 2007    History of Doppler ultrasound     of liver showed hyperplasia.  Hyperlipidemia     Hypertension     Low testosterone     Type II or unspecified type diabetes mellitus without mention of complication, not stated as uncontrolled 2005     Past Surgical History:   Procedure Laterality Date    CARDIAC CATHETERIZATION  2007    100% occlusion of first lateral branch of circumflex.  showing 100% distal occlusion of small blood vessel: DAILY, Disp: 90 capsule, Rfl: 3    Semaglutide (RYBELSUS) 3 MG TABS, Once a day, Disp: 90 tablet, Rfl: 03    Insulin Pen Needle (NOVOFINE) 32G X 6 MM MISC, Tid, Disp: 300 each, Rfl: 3    Testosterone (ANDROGEL PUMP) 20.25 MG/ACT (1.62%) GEL gel, Apply 2 pump depressions daily, Disp: 3 Bottle, Rfl: 3    allopurinol (ZYLOPRIM) 300 MG tablet, TAKE 1 TABLET BY MOUTH EVERY DAY, Disp: 90 tablet, Rfl: 1    atorvastatin (LIPITOR) 40 MG tablet, TAKE 1 TABLET BY MOUTH EVERY DAY, Disp: 90 tablet, Rfl: 1    hydroCHLOROthiazide (HYDRODIURIL) 25 MG tablet, TAKE 1 TABLET BY MOUTH EVERY DAY, Disp: 90 tablet, Rfl: 1    benazepril (LOTENSIN) 20 MG tablet, TAKE 1 TABLET BY MOUTH EVERY DAY, Disp: 90 tablet, Rfl: 1    omega-3 acid ethyl esters (LOVAZA) 1 g capsule, Take 2 capsules by mouth 2 times daily, Disp: 360 capsule, Rfl: 3    carvedilol (COREG) 6.25 MG tablet, Take 6.25 mg by mouth 2 times daily (with meals). , Disp: , Rfl:     testosterone (ANDROGEL; TESTIM) 50 MG/5GM (1%) GEL 1% gel, Apply 2 Tubes topically daily for 30 days. , Disp: 60 Tube, Rfl: 3  Lab Results   Component Value Date     08/26/2019    K 3.9 08/26/2019    CL 95 08/26/2019    CO2 26 08/26/2019    BUN 14 08/26/2019    CREATININE 0.74 08/26/2019    GLUCOSE 196 06/04/2021    CALCIUM 10.0 (H) 08/26/2019    PROT 8.0 10/07/2015    LABALBU 4.4 10/07/2015    BILITOT 0.5 10/07/2015    ALKPHOS 223 (H) 10/07/2015    AST 24 10/07/2015    ALT 36 10/07/2015    LABGLOM >60.0 08/26/2019    GFRAA >60.0 08/26/2019       Lab Results   Component Value Date    LABA1C 8.7 06/04/2021    LABA1C 9.7 03/05/2021    LABA1C 9.8 09/04/2020     Lab Results   Component Value Date    HDL 31 (L) 03/09/2016    HDL 34 (L) 10/07/2015    HDL 32 (L) 12/05/2014    LDLCALC 55 03/09/2016    LDLCALC 68 10/07/2015    LDLCALC 85 12/05/2014    CHOL 151 03/09/2016    CHOL 157 10/07/2015    CHOL 159 12/05/2014    TRIG 324 (H) 03/09/2016    TRIG 273 (H) 10/07/2015    TRIG 211 (H) 12/05/2014 Lab Results   Component Value Date    TESTM 137 (L) 08/26/2019       Review of Systems   Cardiovascular: Negative. Endocrine: Negative. All other systems reviewed and are negative. Objective:   Physical Exam  Vitals reviewed. Constitutional:       Appearance: Normal appearance. He is obese. HENT:      Head: Normocephalic and atraumatic. Hair is normal.      Right Ear: External ear normal.      Left Ear: External ear normal.      Nose: Nose normal.   Eyes:      General: No scleral icterus. Right eye: No discharge. Left eye: No discharge. Extraocular Movements: Extraocular movements intact. Conjunctiva/sclera: Conjunctivae normal.   Neck:      Trachea: Trachea normal.   Cardiovascular:      Rate and Rhythm: Normal rate. Pulmonary:      Effort: Pulmonary effort is normal.   Musculoskeletal:         General: Normal range of motion. Cervical back: Normal range of motion and neck supple. Legs:    Neurological:      General: No focal deficit present. Mental Status: He is alert and oriented to person, place, and time.    Psychiatric:         Mood and Affect: Mood normal.         Behavior: Behavior normal.

## 2021-07-12 RX ORDER — ATORVASTATIN CALCIUM 40 MG/1
TABLET, FILM COATED ORAL
Qty: 90 TABLET | Refills: 1 | Status: SHIPPED | OUTPATIENT
Start: 2021-07-12 | End: 2022-01-17

## 2021-09-03 ENCOUNTER — OFFICE VISIT (OUTPATIENT)
Dept: ENDOCRINOLOGY | Age: 56
End: 2021-09-03
Payer: COMMERCIAL

## 2021-09-03 VITALS
BODY MASS INDEX: 33.24 KG/M2 | DIASTOLIC BLOOD PRESSURE: 76 MMHG | WEIGHT: 259 LBS | HEART RATE: 85 BPM | OXYGEN SATURATION: 96 % | HEIGHT: 74 IN | SYSTOLIC BLOOD PRESSURE: 113 MMHG

## 2021-09-03 DIAGNOSIS — E29.1 HYPOGONADISM IN MALE: ICD-10-CM

## 2021-09-03 LAB
CHP ED QC CHECK: NORMAL
GLUCOSE BLD-MCNC: 151 MG/DL
HBA1C MFR BLD: 7.8 %

## 2021-09-03 PROCEDURE — 2022F DILAT RTA XM EVC RTNOPTHY: CPT | Performed by: INTERNAL MEDICINE

## 2021-09-03 PROCEDURE — 3017F COLORECTAL CA SCREEN DOC REV: CPT | Performed by: INTERNAL MEDICINE

## 2021-09-03 PROCEDURE — G8427 DOCREV CUR MEDS BY ELIG CLIN: HCPCS | Performed by: INTERNAL MEDICINE

## 2021-09-03 PROCEDURE — 83036 HEMOGLOBIN GLYCOSYLATED A1C: CPT | Performed by: INTERNAL MEDICINE

## 2021-09-03 PROCEDURE — G8417 CALC BMI ABV UP PARAM F/U: HCPCS | Performed by: INTERNAL MEDICINE

## 2021-09-03 PROCEDURE — 99214 OFFICE O/P EST MOD 30 MIN: CPT | Performed by: INTERNAL MEDICINE

## 2021-09-03 PROCEDURE — 3051F HG A1C>EQUAL 7.0%<8.0%: CPT | Performed by: INTERNAL MEDICINE

## 2021-09-03 PROCEDURE — 1036F TOBACCO NON-USER: CPT | Performed by: INTERNAL MEDICINE

## 2021-09-03 PROCEDURE — 82962 GLUCOSE BLOOD TEST: CPT | Performed by: INTERNAL MEDICINE

## 2021-09-03 ASSESSMENT — ENCOUNTER SYMPTOMS
RESPIRATORY NEGATIVE: 1
EYES NEGATIVE: 1

## 2021-09-03 NOTE — PROGRESS NOTES
hypoglycemic complications. Symptoms are improving. Diabetic complications include impotence and peripheral neuropathy. Current diabetic treatment includes insulin pump. Meal planning includes carbohydrate counting. His overall blood glucose range is 140-180 mg/dl. (Lab Results       Component                Value               Date                       LABA1C                   7.8                 09/03/2021              Reviewed 2-week pump download average blood sugar was 206±50  56% time in range  Auto mode was 80%  Manual mode was 20%  Reviewed basal rate 2.75 units/h carb ratio was 3 sensitivity was 12) An ACE inhibitor/angiotensin II receptor blocker is being taken. Past Medical History:   Diagnosis Date    Erectile dysfunction     Focal nodular hyperplasia of liver 2/13/2013    History of cardiovascular stress test 2007    History of Doppler ultrasound     of liver showed hyperplasia.  Hyperlipidemia     Hypertension     Low testosterone     Type II or unspecified type diabetes mellitus without mention of complication, not stated as uncontrolled 2005     Past Surgical History:   Procedure Laterality Date    CARDIAC CATHETERIZATION  2007    100% occlusion of first lateral branch of circumflex. showing 100% distal occlusion of small blood vessel: mild disease.      Social History     Socioeconomic History    Marital status:      Spouse name: Not on file    Number of children: Not on file    Years of education: Not on file    Highest education level: Not on file   Occupational History    Not on file   Tobacco Use    Smoking status: Never Smoker    Smokeless tobacco: Never Used   Substance and Sexual Activity    Alcohol use: Not on file    Drug use: Not on file    Sexual activity: Not on file   Other Topics Concern    Not on file   Social History Narrative    Not on file     Social Determinants of Health     Financial Resource Strain:     Difficulty of Paying Living Expenses: Food Insecurity:     Worried About Running Out of Food in the Last Year:     [address] in the Last Year:    Transportation Needs:     Lack of Transportation (Medical):  Lack of Transportation (Non-Medical):    Physical Activity:     Days of Exercise per Week:     Minutes of Exercise per Session:    Stress:     Feeling of Stress :    Social Connections:     Frequency of Communication with Friends and Family:     Frequency of Social Gatherings with Friends and Family:     Attends Yazdanism Services:     Active Member of Clubs or Organizations:     Attends Club or Organization Meetings:     Marital Status:    Intimate Partner Violence:     Fear of Current or Ex-Partner:     Emotionally Abused:     Physically Abused:     Sexually Abused:      No family history on file.   Allergies   Allergen Reactions    Dye [Barium-Containing Compounds] Hives       Current Outpatient Medications:     insulin aspart (NOVOLOG) 100 UNIT/ML injection vial, USE VIA INSULIN PUMP MAX DAILY DOSE 200 UNITS, Disp: 6 vial, Rfl: 3    atorvastatin (LIPITOR) 40 MG tablet, TAKE 1 TABLET BY MOUTH EVERY DAY, Disp: 90 tablet, Rfl: 1    insulin aspart (NOVOLOG) 100 UNIT/ML injection vial, Use via insulin pump max daily dose 100 units, Disp: 3 vial, Rfl: 3    gabapentin (NEURONTIN) 400 MG capsule, TAKE 1 CAPSULE BY MOUTH 3 TIMES DAILY, Disp: 90 capsule, Rfl: 3    sildenafil (VIAGRA) 100 MG tablet, Take 1 tablet by mouth as needed for Erectile Dysfunction, Disp: 30 tablet, Rfl: 3    Accu-Chek FastClix Lancets MISC, Test 4x daily, Disp: 150 each, Rfl: 3    blood glucose test strips (ACCU-CHEK GUIDE) strip, Test 4x daily, Disp: 150 each, Rfl: 3    Semaglutide (RYBELSUS) 3 MG TABS, Once a day, Disp: 90 tablet, Rfl: 03    Insulin Pen Needle (NOVOFINE) 32G X 6 MM MISC, Tid, Disp: 300 each, Rfl: 3    allopurinol (ZYLOPRIM) 300 MG tablet, TAKE 1 TABLET BY MOUTH EVERY DAY, Disp: 90 tablet, Rfl: 1    hydroCHLOROthiazide (HYDRODIURIL) 25 MG tablet, TAKE 1 TABLET BY MOUTH EVERY DAY, Disp: 90 tablet, Rfl: 1    benazepril (LOTENSIN) 20 MG tablet, TAKE 1 TABLET BY MOUTH EVERY DAY, Disp: 90 tablet, Rfl: 1    omega-3 acid ethyl esters (LOVAZA) 1 g capsule, Take 2 capsules by mouth 2 times daily, Disp: 360 capsule, Rfl: 3    carvedilol (COREG) 6.25 MG tablet, Take 6.25 mg by mouth 2 times daily (with meals). , Disp: , Rfl:     Testosterone (ANDROGEL PUMP) 20.25 MG/ACT (1.62%) GEL gel, Apply 2 pump depressions daily, Disp: 3 Bottle, Rfl: 3    testosterone (ANDROGEL; TESTIM) 50 MG/5GM (1%) GEL 1% gel, Apply 2 Tubes topically daily for 30 days. , Disp: 60 Tube, Rfl: 3  Lab Results   Component Value Date     08/26/2019    K 3.9 08/26/2019    CL 95 08/26/2019    CO2 26 08/26/2019    BUN 14 08/26/2019    CREATININE 0.74 08/26/2019    GLUCOSE 151 09/03/2021    CALCIUM 10.0 (H) 08/26/2019    PROT 8.0 10/07/2015    LABALBU 4.4 10/07/2015    BILITOT 0.5 10/07/2015    ALKPHOS 223 (H) 10/07/2015    AST 24 10/07/2015    ALT 36 10/07/2015    LABGLOM >60.0 08/26/2019    GFRAA >60.0 08/26/2019     No results found for: WBC, HGB, HCT, MCV, PLT  Lab Results   Component Value Date    LABA1C 7.8 09/03/2021    LABA1C 8.7 06/04/2021    LABA1C 9.7 03/05/2021     Lab Results   Component Value Date    HDL 31 (L) 03/09/2016    HDL 34 (L) 10/07/2015    HDL 32 (L) 12/05/2014    LDLCALC 55 03/09/2016    LDLCALC 68 10/07/2015    LDLCALC 85 12/05/2014    CHOL 151 03/09/2016    CHOL 157 10/07/2015    CHOL 159 12/05/2014    TRIG 324 (H) 03/09/2016    TRIG 273 (H) 10/07/2015    TRIG 211 (H) 12/05/2014     Lab Results   Component Value Date    TESTM 137 (L) 08/26/2019     No results found for: TSH, TSHREFLEX, FT3, T4FREE  No results found for: TPOABS    Review of Systems   Eyes: Negative. Respiratory: Negative. Cardiovascular: Negative. Endocrine: Negative. Genitourinary: Positive for impotence.    All other systems reviewed and are negative. Objective:   Physical Exam  Vitals reviewed. Constitutional:       Appearance: Normal appearance. He is obese. HENT:      Head: Normocephalic and atraumatic. Hair is normal.      Right Ear: External ear normal.      Left Ear: External ear normal.      Nose: Nose normal.   Eyes:      General: No scleral icterus. Right eye: No discharge. Left eye: No discharge. Extraocular Movements: Extraocular movements intact. Conjunctiva/sclera: Conjunctivae normal.   Neck:      Trachea: Trachea normal.   Cardiovascular:      Rate and Rhythm: Normal rate. Pulmonary:      Effort: Pulmonary effort is normal.   Musculoskeletal:         General: Normal range of motion. Cervical back: Normal range of motion and neck supple. Neurological:      General: No focal deficit present. Mental Status: He is alert and oriented to person, place, and time.    Psychiatric:         Mood and Affect: Mood normal.         Behavior: Behavior normal.

## 2021-10-01 RX ORDER — GABAPENTIN 400 MG/1
CAPSULE ORAL
Qty: 90 CAPSULE | Refills: 3 | Status: SHIPPED | OUTPATIENT
Start: 2021-10-01 | End: 2022-10-17

## 2021-10-01 NOTE — TELEPHONE ENCOUNTER
Patient is  requesting medication refill.  Please approve or deny this request.    Rx requested:  Requested Prescriptions      No prescriptions requested or ordered in this encounter         Last Office Visit:   9/3/2021      Next Visit Date:  Future Appointments   Date Time Provider Lilo Ramirez   12/3/2021  9:30 AM MD Hank Woods

## 2021-10-04 RX ORDER — GABAPENTIN 400 MG/1
CAPSULE ORAL
Qty: 90 CAPSULE | Refills: 3 | Status: SHIPPED | OUTPATIENT
Start: 2021-10-04 | End: 2021-12-22 | Stop reason: SDUPTHER

## 2021-12-03 ENCOUNTER — OFFICE VISIT (OUTPATIENT)
Dept: ENDOCRINOLOGY | Age: 56
End: 2021-12-03
Payer: COMMERCIAL

## 2021-12-03 VITALS
HEART RATE: 90 BPM | OXYGEN SATURATION: 98 % | BODY MASS INDEX: 34.01 KG/M2 | HEIGHT: 74 IN | DIASTOLIC BLOOD PRESSURE: 68 MMHG | WEIGHT: 265 LBS | SYSTOLIC BLOOD PRESSURE: 132 MMHG

## 2021-12-03 DIAGNOSIS — E29.1 HYPOGONADISM IN MALE: ICD-10-CM

## 2021-12-03 LAB
CHP ED QC CHECK: NORMAL
GLUCOSE BLD-MCNC: 206 MG/DL
HBA1C MFR BLD: 7.9 %

## 2021-12-03 PROCEDURE — 99213 OFFICE O/P EST LOW 20 MIN: CPT | Performed by: INTERNAL MEDICINE

## 2021-12-03 PROCEDURE — 2022F DILAT RTA XM EVC RTNOPTHY: CPT | Performed by: INTERNAL MEDICINE

## 2021-12-03 PROCEDURE — G8417 CALC BMI ABV UP PARAM F/U: HCPCS | Performed by: INTERNAL MEDICINE

## 2021-12-03 PROCEDURE — 82962 GLUCOSE BLOOD TEST: CPT | Performed by: INTERNAL MEDICINE

## 2021-12-03 PROCEDURE — G8484 FLU IMMUNIZE NO ADMIN: HCPCS | Performed by: INTERNAL MEDICINE

## 2021-12-03 PROCEDURE — G8427 DOCREV CUR MEDS BY ELIG CLIN: HCPCS | Performed by: INTERNAL MEDICINE

## 2021-12-03 PROCEDURE — 3051F HG A1C>EQUAL 7.0%<8.0%: CPT | Performed by: INTERNAL MEDICINE

## 2021-12-03 PROCEDURE — 3017F COLORECTAL CA SCREEN DOC REV: CPT | Performed by: INTERNAL MEDICINE

## 2021-12-03 PROCEDURE — 83036 HEMOGLOBIN GLYCOSYLATED A1C: CPT | Performed by: INTERNAL MEDICINE

## 2021-12-03 PROCEDURE — 1036F TOBACCO NON-USER: CPT | Performed by: INTERNAL MEDICINE

## 2021-12-03 NOTE — PROGRESS NOTES
12/3/2021    Assessment:       Diagnosis Orders   1. Uncontrolled type 2 diabetes mellitus with complication, with long-term current use of insulin ([facility])  POCT Glucose    POCT glycosylated hemoglobin (Hb A1C)    Basic Metabolic Panel    Hemoglobin A1C    Microalbumin / Creatinine Urine Ratio   2. Hypogonadism in male  Testosterone Free And Total Male         PLAN:     Orders Placed This Encounter   Procedures    Basic Metabolic Panel     Standing Status:   Future     Standing Expiration Date:   12/3/2022    Hemoglobin A1C     Standing Status:   Future     Standing Expiration Date:   12/3/2022    Testosterone Free And Total Male     Standing Status:   Future     Standing Expiration Date:   12/3/2022    Microalbumin / Creatinine Urine Ratio     Standing Status:   Future     Standing Expiration Date:   12/3/2022    POCT Glucose    POCT glycosylated hemoglobin (Hb A1C)     Continue Humalog via pump  Continue AndroGel 2 tubes daily  Repeat labs  Orders Placed This Encounter   Procedures    POCT Glucose    POCT glycosylated hemoglobin (Hb A1C)       Subjective:     Chief Complaint   Patient presents with    Diabetes     Type 2     Vitals:    12/03/21 0941   BP: 132/68   Pulse: 90   SpO2: 98%   Weight: 265 lb (120.2 kg)   Height: 6' 2\" (1.88 m)     Wt Readings from Last 3 Encounters:   12/03/21 265 lb (120.2 kg)   09/03/21 259 lb (117.5 kg)   06/04/21 252 lb (114.3 kg)     BP Readings from Last 3 Encounters:   12/03/21 132/68   09/03/21 113/76   06/04/21 105/70     Follow-up on type 2 diabetes patient on insulin pump no recent labs to review A1c was done today 7.9 stable    Diabetes  He presents for his follow-up diabetic visit. He has type 2 diabetes mellitus. There are no hypoglycemic associated symptoms. Risk factors for coronary artery disease include obesity. Current diabetic treatment includes insulin pump. His overall blood glucose range is 180-200 mg/dl.  (Lab Results       Component                Value Date                       LABA1C                   7.9                 12/03/2021                Reviewed 2-week pump download average blood sugar was 193±47  64% time in range  82% in auto mode  Basal rate  12 AM 2.75 units/h  12 PM 3 units/h  Carb ratio 3  Sensitivity was 10) An ACE inhibitor/angiotensin II receptor blocker is being taken. Past Medical History:   Diagnosis Date    Erectile dysfunction     Focal nodular hyperplasia of liver 2/13/2013    History of cardiovascular stress test 2007    History of Doppler ultrasound     of liver showed hyperplasia.  Hyperlipidemia     Hypertension     Low testosterone     Type II or unspecified type diabetes mellitus without mention of complication, not stated as uncontrolled 2005     Past Surgical History:   Procedure Laterality Date    CARDIAC CATHETERIZATION  2007    100% occlusion of first lateral branch of circumflex. showing 100% distal occlusion of small blood vessel: mild disease. Social History     Socioeconomic History    Marital status:      Spouse name: Not on file    Number of children: Not on file    Years of education: Not on file    Highest education level: Not on file   Occupational History    Not on file   Tobacco Use    Smoking status: Never Smoker    Smokeless tobacco: Never Used   Substance and Sexual Activity    Alcohol use: Not on file    Drug use: Not on file    Sexual activity: Not on file   Other Topics Concern    Not on file   Social History Narrative    Not on file     Social Determinants of Health     Financial Resource Strain:     Difficulty of Paying Living Expenses: Not on file   Food Insecurity:     Worried About Running Out of Food in the Last Year: Not on file    Mary of Food in the Last Year: Not on file   Transportation Needs:     Lack of Transportation (Medical): Not on file    Lack of Transportation (Non-Medical):  Not on file   Physical Activity:     Days of Exercise per Week: Not on file    Minutes of Exercise per Session: Not on file   Stress:     Feeling of Stress : Not on file   Social Connections:     Frequency of Communication with Friends and Family: Not on file    Frequency of Social Gatherings with Friends and Family: Not on file    Attends Buddhism Services: Not on file    Active Member of Clubs or Organizations: Not on file    Attends Club or Organization Meetings: Not on file    Marital Status: Not on file   Intimate Partner Violence:     Fear of Current or Ex-Partner: Not on file    Emotionally Abused: Not on file    Physically Abused: Not on file    Sexually Abused: Not on file   Housing Stability:     Unable to Pay for Housing in the Last Year: Not on file    Number of [place] in the Last Year: Not on file    Unstable Housing in the Last Year: Not on file     No family history on file. Allergies   Allergen Reactions    Dye [Barium-Containing Compounds] Hives       Current Outpatient Medications:     testosterone (ANDROGEL; TESTIM) 50 MG/5GM (1%) GEL 1% gel, Apply 2 Tubes topically daily for 30 days. , Disp: 180 each, Rfl: 3    gabapentin (NEURONTIN) 400 MG capsule, TAKE 1 CAPSULE BY MOUTH THREE TIMES A DAY, Disp: 90 capsule, Rfl: 3    gabapentin (NEURONTIN) 400 MG capsule, TAKE 1 CAPSULE BY MOUTH 3 TIMES DAILY, Disp: 90 capsule, Rfl: 3    insulin aspart (NOVOLOG) 100 UNIT/ML injection vial, USE VIA INSULIN PUMP MAX DAILY DOSE 200 UNITS, Disp: 6 vial, Rfl: 3    atorvastatin (LIPITOR) 40 MG tablet, TAKE 1 TABLET BY MOUTH EVERY DAY, Disp: 90 tablet, Rfl: 1    insulin aspart (NOVOLOG) 100 UNIT/ML injection vial, Use via insulin pump max daily dose 100 units, Disp: 3 vial, Rfl: 3    sildenafil (VIAGRA) 100 MG tablet, Take 1 tablet by mouth as needed for Erectile Dysfunction, Disp: 30 tablet, Rfl: 3    Accu-Chek FastClix Lancets MISC, Test 4x daily, Disp: 150 each, Rfl: 3    blood glucose test strips (ACCU-CHEK GUIDE) strip, Test 4x daily, Disp: 150 each, Rfl: 3    Semaglutide (RYBELSUS) 3 MG TABS, Once a day, Disp: 90 tablet, Rfl: 03    Insulin Pen Needle (NOVOFINE) 32G X 6 MM MISC, Tid, Disp: 300 each, Rfl: 3    Testosterone (ANDROGEL PUMP) 20.25 MG/ACT (1.62%) GEL gel, Apply 2 pump depressions daily, Disp: 3 Bottle, Rfl: 3    allopurinol (ZYLOPRIM) 300 MG tablet, TAKE 1 TABLET BY MOUTH EVERY DAY, Disp: 90 tablet, Rfl: 1    hydroCHLOROthiazide (HYDRODIURIL) 25 MG tablet, TAKE 1 TABLET BY MOUTH EVERY DAY, Disp: 90 tablet, Rfl: 1    benazepril (LOTENSIN) 20 MG tablet, TAKE 1 TABLET BY MOUTH EVERY DAY, Disp: 90 tablet, Rfl: 1    omega-3 acid ethyl esters (LOVAZA) 1 g capsule, Take 2 capsules by mouth 2 times daily, Disp: 360 capsule, Rfl: 3    carvedilol (COREG) 6.25 MG tablet, Take 6.25 mg by mouth 2 times daily (with meals). , Disp: , Rfl:   Lab Results   Component Value Date     08/26/2019    K 3.9 08/26/2019    CL 95 08/26/2019    CO2 26 08/26/2019    BUN 14 08/26/2019    CREATININE 0.74 08/26/2019    GLUCOSE 206 12/03/2021    CALCIUM 10.0 (H) 08/26/2019    PROT 8.0 10/07/2015    LABALBU 4.4 10/07/2015    BILITOT 0.5 10/07/2015    ALKPHOS 223 (H) 10/07/2015    AST 24 10/07/2015    ALT 36 10/07/2015    LABGLOM >60.0 08/26/2019    GFRAA >60.0 08/26/2019     No results found for: WBC, HGB, HCT, MCV, PLT  Lab Results   Component Value Date    LABA1C 7.8 09/03/2021    LABA1C 8.7 06/04/2021    LABA1C 9.7 03/05/2021     Lab Results   Component Value Date    HDL 31 (L) 03/09/2016    HDL 34 (L) 10/07/2015    HDL 32 (L) 12/05/2014    LDLCALC 55 03/09/2016    LDLCALC 68 10/07/2015    LDLCALC 85 12/05/2014    CHOL 151 03/09/2016    CHOL 157 10/07/2015    CHOL 159 12/05/2014    TRIG 324 (H) 03/09/2016    TRIG 273 (H) 10/07/2015    TRIG 211 (H) 12/05/2014     Lab Results   Component Value Date    TESTM 137 (L) 08/26/2019     No results found for: TSH, TSHREFLEX, FT3, T4FREE  No results found for: TPOABS    Review of Systems    Objective:   Physical Exam  Vitals reviewed. Constitutional:       Appearance: Normal appearance. He is obese. HENT:      Head: Normocephalic and atraumatic. Hair is normal.      Right Ear: External ear normal.      Left Ear: External ear normal.      Nose: Nose normal.   Eyes:      General: No scleral icterus. Right eye: No discharge. Left eye: No discharge. Extraocular Movements: Extraocular movements intact. Conjunctiva/sclera: Conjunctivae normal.   Neck:      Trachea: Trachea normal.   Cardiovascular:      Rate and Rhythm: Normal rate. Pulmonary:      Effort: Pulmonary effort is normal.   Musculoskeletal:         General: Normal range of motion. Cervical back: Normal range of motion and neck supple. Neurological:      General: No focal deficit present. Mental Status: He is alert and oriented to person, place, and time.    Psychiatric:         Mood and Affect: Mood normal.         Behavior: Behavior normal.

## 2021-12-22 RX ORDER — GABAPENTIN 400 MG/1
CAPSULE ORAL
Qty: 120 CAPSULE | Refills: 3 | Status: SHIPPED | OUTPATIENT
Start: 2021-12-22 | End: 2022-05-06

## 2022-01-17 RX ORDER — ATORVASTATIN CALCIUM 40 MG/1
TABLET, FILM COATED ORAL
Qty: 30 TABLET | Refills: 5 | Status: SHIPPED | OUTPATIENT
Start: 2022-01-17 | End: 2022-07-26

## 2022-02-23 RX ORDER — ORAL SEMAGLUTIDE 3 MG/1
TABLET ORAL
Qty: 90 TABLET | Refills: 3 | Status: SHIPPED | OUTPATIENT
Start: 2022-02-23

## 2022-02-23 NOTE — TELEPHONE ENCOUNTER
Patient  requesting medication refill.  Please approve or deny this request.    Rx requested:  Requested Prescriptions     Pending Prescriptions Disp Refills    Semaglutide (RYBELSUS) 3 MG TABS 90 tablet 03     Sig: Once a day         Last Office Visit:   12/3/2021      Next Visit Date:  Future Appointments   Date Time Provider Lilo Ramirez   3/4/2022  9:30 AM Dakota Quinteros  [address]

## 2022-03-04 ENCOUNTER — OFFICE VISIT (OUTPATIENT)
Dept: ENDOCRINOLOGY | Age: 57
End: 2022-03-04
Payer: COMMERCIAL

## 2022-03-04 ENCOUNTER — TELEPHONE (OUTPATIENT)
Dept: ENDOCRINOLOGY | Age: 57
End: 2022-03-04

## 2022-03-04 VITALS
HEIGHT: 74 IN | OXYGEN SATURATION: 97 % | HEART RATE: 105 BPM | DIASTOLIC BLOOD PRESSURE: 71 MMHG | WEIGHT: 260 LBS | BODY MASS INDEX: 33.37 KG/M2 | SYSTOLIC BLOOD PRESSURE: 111 MMHG

## 2022-03-04 DIAGNOSIS — M10.9 ACUTE GOUT OF LEFT ANKLE, UNSPECIFIED CAUSE: ICD-10-CM

## 2022-03-04 DIAGNOSIS — E29.1 HYPOGONADISM IN MALE: ICD-10-CM

## 2022-03-04 LAB
CHP ED QC CHECK: NORMAL
GLUCOSE BLD-MCNC: 167 MG/DL
HBA1C MFR BLD: 7.5 %

## 2022-03-04 PROCEDURE — 3051F HG A1C>EQUAL 7.0%<8.0%: CPT | Performed by: INTERNAL MEDICINE

## 2022-03-04 PROCEDURE — G8427 DOCREV CUR MEDS BY ELIG CLIN: HCPCS | Performed by: INTERNAL MEDICINE

## 2022-03-04 PROCEDURE — 2022F DILAT RTA XM EVC RTNOPTHY: CPT | Performed by: INTERNAL MEDICINE

## 2022-03-04 PROCEDURE — 1036F TOBACCO NON-USER: CPT | Performed by: INTERNAL MEDICINE

## 2022-03-04 PROCEDURE — 3017F COLORECTAL CA SCREEN DOC REV: CPT | Performed by: INTERNAL MEDICINE

## 2022-03-04 PROCEDURE — 83036 HEMOGLOBIN GLYCOSYLATED A1C: CPT | Performed by: INTERNAL MEDICINE

## 2022-03-04 PROCEDURE — 82962 GLUCOSE BLOOD TEST: CPT | Performed by: INTERNAL MEDICINE

## 2022-03-04 PROCEDURE — 99214 OFFICE O/P EST MOD 30 MIN: CPT | Performed by: INTERNAL MEDICINE

## 2022-03-04 PROCEDURE — G8484 FLU IMMUNIZE NO ADMIN: HCPCS | Performed by: INTERNAL MEDICINE

## 2022-03-04 PROCEDURE — G8417 CALC BMI ABV UP PARAM F/U: HCPCS | Performed by: INTERNAL MEDICINE

## 2022-03-04 RX ORDER — INDOMETHACIN 50 MG/1
50 CAPSULE ORAL 2 TIMES DAILY WITH MEALS
Qty: 20 CAPSULE | Refills: 3 | Status: SHIPPED | OUTPATIENT
Start: 2022-03-04 | End: 2022-03-14

## 2022-03-04 RX ORDER — COLCHICINE 0.6 MG/1
0.6 TABLET ORAL 2 TIMES DAILY
Qty: 20 TABLET | Refills: 2 | Status: SHIPPED | OUTPATIENT
Start: 2022-03-04 | End: 2022-03-14

## 2022-03-04 RX ORDER — TESTOSTERONE 16.2 MG/G
GEL TRANSDERMAL
Qty: 75 G | Refills: 2 | Status: SHIPPED | OUTPATIENT
Start: 2022-03-04 | End: 2022-06-03

## 2022-03-04 NOTE — TELEPHONE ENCOUNTER
Pharmacy is calling because they have a drug interaction between the indocin and the atorvastatin. Please advise. Thanks!

## 2022-03-04 NOTE — PROGRESS NOTES
3/4/2022    Assessment:       Diagnosis Orders   1. Uncontrolled type 2 diabetes mellitus with complication, with long-term current use of insulin (HCC)  POCT Glucose    POCT glycosylated hemoglobin (Hb A1C)   2. Hypogonadism in male  Testosterone (ANDROGEL PUMP) 20.25 MG/ACT (1.62%) GEL gel   3.  Acute gout of left ankle, unspecified cause           PLAN:     Start patient on indomethacin and colchicine for gout flareup patient continues with his allopurinol to have labs done for uric acid testosterone through primary care  Continue AndroGel as per current dose  Continue patient on current pump setting changes insulin sensitivity to 8  Current current basal rate carb ratio  Continue current dose of testosterone  More than 50% of 30-minute spent patient education counseling  Orders Placed This Encounter   Medications    indomethacin (INDOCIN) 50 MG capsule     Sig: Take 1 capsule by mouth 2 times daily (with meals) for 10 days     Dispense:  20 capsule     Refill:  3    colchicine (COLCRYS) 0.6 MG tablet     Sig: Take 1 tablet by mouth 2 times daily for 10 days     Dispense:  20 tablet     Refill:  2    Testosterone (ANDROGEL PUMP) 20.25 MG/ACT (1.62%) GEL gel     Sig: Apply 2 pump depressions daily     Dispense:  75 g     Refill:  2           Orders Placed This Encounter   Procedures    POCT Glucose    POCT glycosylated hemoglobin (Hb A1C)       Subjective:     Chief Complaint   Patient presents with    Diabetes    Hypogonadism     Vitals:    03/04/22 0907   BP: 111/71   Pulse: 105   SpO2: 97%   Weight: 260 lb (117.9 kg)   Height: 6' 2\" (1.88 m)     Wt Readings from Last 3 Encounters:   03/04/22 260 lb (117.9 kg)   12/03/21 265 lb (120.2 kg)   09/03/21 259 lb (117.5 kg)     BP Readings from Last 3 Encounters:   03/04/22 111/71   12/03/21 132/68   09/03/21 113/76     Follow-up on type 2 diabetes overall A1c has improved her blood sugars have been higher lately due to lack of activity recent gout attack over History Narrative    Not on file     Social Determinants of Health     Financial Resource Strain:     Difficulty of Paying Living Expenses: Not on file   Food Insecurity:     Worried About Running Out of Food in the Last Year: Not on file    Mary of Food in the Last Year: Not on file   Transportation Needs:     Lack of Transportation (Medical): Not on file    Lack of Transportation (Non-Medical): Not on file   Physical Activity:     Days of Exercise per Week: Not on file    Minutes of Exercise per Session: Not on file   Stress:     Feeling of Stress : Not on file   Social Connections:     Frequency of Communication with Friends and Family: Not on file    Frequency of Social Gatherings with Friends and Family: Not on file    Attends Christian Services: Not on file    Active Member of [address] [place] [address] [employer] or Organizations: Not on file    Attends Club or Organization Meetings: Not on file    Marital Status: Not on file   Intimate Partner Violence:     Fear of Current or Ex-Partner: Not on file    Emotionally Abused: Not on file    Physically Abused: Not on file    Sexually Abused: Not on file   Housing Stability:     Unable to Pay for Housing in the Last Year: Not on file    Number of [place] in the Last Year: Not on file    Unstable Housing in the Last Year: Not on file     History reviewed. No pertinent family history.   Allergies   Allergen Reactions    Dye [Barium-Containing Compounds] Hives       Current Outpatient Medications:     Semaglutide (RYBELSUS) 3 MG TABS, Once a day, Disp: 90 tablet, Rfl: 03    insulin aspart (NOVOLOG) 100 UNIT/ML injection vial, USE VIA INSULIN PUMP, MAX DAILY DOSE 200 UNITS, Disp: 70 mL, Rfl: 5    atorvastatin (LIPITOR) 40 MG tablet, TAKE 1 TABLET BY MOUTH EVERY DAY, Disp: 30 tablet, Rfl: 5    gabapentin (NEURONTIN) 400 MG capsule, TAKE 1 CAPSULE BY MOUTH FOUR TIMES A DAY, Disp: 120 capsule, Rfl: 3    testosterone (ANDROGEL; TESTIM) 50 MG/5GM (1%) GEL 1% gel, Apply 2 Tubes topically daily for 30 days. , Disp: 180 each, Rfl: 3    gabapentin (NEURONTIN) 400 MG capsule, TAKE 1 CAPSULE BY MOUTH 3 TIMES DAILY, Disp: 90 capsule, Rfl: 3    sildenafil (VIAGRA) 100 MG tablet, Take 1 tablet by mouth as needed for Erectile Dysfunction, Disp: 30 tablet, Rfl: 3    Accu-Chek FastClix Lancets MISC, Test 4x daily, Disp: 150 each, Rfl: 3    blood glucose test strips (ACCU-CHEK GUIDE) strip, Test 4x daily, Disp: 150 each, Rfl: 3    Insulin Pen Needle (NOVOFINE) 32G X 6 MM MISC, Tid, Disp: 300 each, Rfl: 3    Testosterone (ANDROGEL PUMP) 20.25 MG/ACT (1.62%) GEL gel, Apply 2 pump depressions daily, Disp: 3 Bottle, Rfl: 3    allopurinol (ZYLOPRIM) 300 MG tablet, TAKE 1 TABLET BY MOUTH EVERY DAY, Disp: 90 tablet, Rfl: 1    hydroCHLOROthiazide (HYDRODIURIL) 25 MG tablet, TAKE 1 TABLET BY MOUTH EVERY DAY, Disp: 90 tablet, Rfl: 1    benazepril (LOTENSIN) 20 MG tablet, TAKE 1 TABLET BY MOUTH EVERY DAY, Disp: 90 tablet, Rfl: 1    omega-3 acid ethyl esters (LOVAZA) 1 g capsule, Take 2 capsules by mouth 2 times daily, Disp: 360 capsule, Rfl: 3    carvedilol (COREG) 6.25 MG tablet, Take 6.25 mg by mouth 2 times daily (with meals). , Disp: , Rfl:   Lab Results   Component Value Date     08/26/2019    K 3.9 08/26/2019    CL 95 08/26/2019    CO2 26 08/26/2019    BUN 14 08/26/2019    CREATININE 0.74 08/26/2019    GLUCOSE 167 03/04/2022    CALCIUM 10.0 (H) 08/26/2019    PROT 8.0 10/07/2015    LABALBU 4.4 10/07/2015    BILITOT 0.5 10/07/2015    ALKPHOS 223 (H) 10/07/2015    AST 24 10/07/2015    ALT 36 10/07/2015    LABGLOM >60.0 08/26/2019    GFRAA >60.0 08/26/2019     No results found for: WBC, HGB, HCT, MCV, PLT  Lab Results   Component Value Date    LABA1C 7.9 12/03/2021    LABA1C 7.8 09/03/2021    LABA1C 8.7 06/04/2021     Lab Results   Component Value Date    HDL 31 (L) 03/09/2016    HDL 34 (L) 10/07/2015    HDL 32 (L) 12/05/2014    LDLCALC 55 03/09/2016    LDLCALC 68 10/07/2015    LDLCALC 85 12/05/2014    CHOL 151 03/09/2016    CHOL 157 10/07/2015    CHOL 159 12/05/2014    TRIG 324 (H) 03/09/2016    TRIG 273 (H) 10/07/2015    TRIG 211 (H) 12/05/2014     Lab Results   Component Value Date    TESTM 137 (L) 08/26/2019     No results found for: TSH, TSHREFLEX, FT3, T4FREE  No results found for: TPOABS    Review of Systems   Constitutional: Positive for fatigue. Endocrine: Negative. Musculoskeletal: Positive for gait problem and joint swelling. All other systems reviewed and are negative. Objective:   Physical Exam  Vitals reviewed. Constitutional:       Appearance: Normal appearance. He is obese. HENT:      Head: Normocephalic and atraumatic. Right Ear: External ear normal.      Left Ear: External ear normal.      Nose: Nose normal.   Eyes:      General: No scleral icterus. Right eye: No discharge. Left eye: No discharge. Extraocular Movements: Extraocular movements intact. Conjunctiva/sclera: Conjunctivae normal.   Cardiovascular:      Rate and Rhythm: Normal rate. Pulmonary:      Effort: Pulmonary effort is normal.   Musculoskeletal:         General: Normal range of motion. Cervical back: Normal range of motion and neck supple. Feet:    Neurological:      General: No focal deficit present. Mental Status: He is alert and oriented to person, place, and time.    Psychiatric:         Mood and Affect: Mood normal.         Behavior: Behavior normal.

## 2022-05-06 RX ORDER — GABAPENTIN 400 MG/1
CAPSULE ORAL
Qty: 120 CAPSULE | Refills: 3 | Status: SHIPPED | OUTPATIENT
Start: 2022-05-06 | End: 2022-08-22

## 2022-06-15 ENCOUNTER — OFFICE VISIT (OUTPATIENT)
Dept: ENDOCRINOLOGY | Age: 57
End: 2022-06-15
Payer: COMMERCIAL

## 2022-06-15 VITALS
SYSTOLIC BLOOD PRESSURE: 144 MMHG | BODY MASS INDEX: 34.01 KG/M2 | OXYGEN SATURATION: 95 % | WEIGHT: 265 LBS | DIASTOLIC BLOOD PRESSURE: 86 MMHG | HEART RATE: 98 BPM | HEIGHT: 74 IN

## 2022-06-15 DIAGNOSIS — E29.1 HYPOGONADISM IN MALE: ICD-10-CM

## 2022-06-15 DIAGNOSIS — Z46.81 INSULIN PUMP TITRATION: ICD-10-CM

## 2022-06-15 LAB
CHP ED QC CHECK: NORMAL
GLUCOSE BLD-MCNC: 112 MG/DL
HBA1C MFR BLD: 7.4 %

## 2022-06-15 PROCEDURE — 2022F DILAT RTA XM EVC RTNOPTHY: CPT | Performed by: INTERNAL MEDICINE

## 2022-06-15 PROCEDURE — G8427 DOCREV CUR MEDS BY ELIG CLIN: HCPCS | Performed by: INTERNAL MEDICINE

## 2022-06-15 PROCEDURE — 3017F COLORECTAL CA SCREEN DOC REV: CPT | Performed by: INTERNAL MEDICINE

## 2022-06-15 PROCEDURE — 3051F HG A1C>EQUAL 7.0%<8.0%: CPT | Performed by: INTERNAL MEDICINE

## 2022-06-15 PROCEDURE — G8417 CALC BMI ABV UP PARAM F/U: HCPCS | Performed by: INTERNAL MEDICINE

## 2022-06-15 PROCEDURE — 1036F TOBACCO NON-USER: CPT | Performed by: INTERNAL MEDICINE

## 2022-06-15 PROCEDURE — 99214 OFFICE O/P EST MOD 30 MIN: CPT | Performed by: INTERNAL MEDICINE

## 2022-06-15 PROCEDURE — 83036 HEMOGLOBIN GLYCOSYLATED A1C: CPT | Performed by: INTERNAL MEDICINE

## 2022-06-15 PROCEDURE — 82962 GLUCOSE BLOOD TEST: CPT | Performed by: INTERNAL MEDICINE

## 2022-06-15 RX ORDER — ORAL SEMAGLUTIDE 7 MG/1
TABLET ORAL
Qty: 90 TABLET | Refills: 3 | Status: SHIPPED | OUTPATIENT
Start: 2022-06-15

## 2022-06-15 ASSESSMENT — ENCOUNTER SYMPTOMS: EYES NEGATIVE: 1

## 2022-06-15 NOTE — PROGRESS NOTES
6/15/2022    Assessment:       Diagnosis Orders   1. Uncontrolled type 2 diabetes mellitus with complication, with long-term current use of insulin ([facility])  POCT Glucose    POCT glycosylated hemoglobin (Hb A1C)    Lipid, Fasting    Hemoglobin E0T    Basic Metabolic Panel, Fasting    Vitamin B12 & Folate   2. Hypogonadism in male  Testosterone, free, total   3. Insulin pump titration           PLAN:     Increase basal rate to 12 AM 3 units/h  12 PM basal rate increased to 3.25 units/h  Continue with insulin to carb ratio of 3  Continue sensitivity at 8  Continue current dose of testosterone  More than 50% of 30-minute spent patient education counseling  Diabetes education provided today:    Insulin pumps, how they work and how they affect blood sugar levels. Continuous Glucose monitor. How it works and checks blood sugars every 5 min. for 4 days during our tests. Managing high and low sugar readings. Orders Placed This Encounter   Procedures    Lipid, Fasting     Standing Status:   Future     Standing Expiration Date:   6/15/2023    Hemoglobin A1C     Standing Status:   Future     Standing Expiration Date:   6/15/2023    Basic Metabolic Panel, Fasting     Standing Status:   Future     Standing Expiration Date:   6/15/2023    Testosterone, free, total     Standing Status:   Future     Standing Expiration Date:   6/15/2023    Vitamin B12 & Folate     Standing Status:   Future     Standing Expiration Date:   6/15/2023    POCT Glucose    POCT glycosylated hemoglobin (Hb A1C)     Increased dose of Rybelsus  Orders Placed This Encounter   Medications    Semaglutide (RYBELSUS) 7 MG TABS     Sig: Once a day     Dispense:  90 tablet     Refill:  03     Return in about 3 months (around 9/15/2022).   Subjective:     Chief Complaint   Patient presents with    Diabetes    Hypogonadism     Vitals:    06/15/22 1341 06/15/22 1343   BP: (!) 144/86 (!) 144/86   Pulse: 98    SpO2: 95%    Weight: 265 lb (120.2 kg) Height: 6' 2\" (1.88 m)      Wt Readings from Last 3 Encounters:   06/15/22 265 lb (120.2 kg)   03/04/22 260 lb (117.9 kg)   12/03/21 265 lb (120.2 kg)     BP Readings from Last 3 Encounters:   06/15/22 (!) 144/86   03/04/22 111/71   12/03/21 132/68     Follow-up on type 2 diabetes history of hypogonadism patient does have diabetic neuropathy recently also diagnosed with Charcot's foot left side had fracture of the navicular bone is going  to get orthotics  Patient also is on Rybelsus 3 mg daily  Hypogonadism on AndroGel 2 pump depressions daily    Diabetes  He presents for his follow-up diabetic visit. He has type 2 diabetes mellitus. Pertinent negatives for diabetes include no polydipsia and no polyuria. Symptoms are improving. Diabetic complications include impotence and peripheral neuropathy. (Charcot's foot left side) Risk factors for coronary artery disease include obesity. Current diabetic treatment includes insulin pump. Meal planning includes carbohydrate counting. His overall blood glucose range is 180-200 mg/dl. (Lab Results       Component                Value               Date                       LABA1C                   7.4                 06/15/2022              Reviewed pump downloads blood sugars average 190±49  Basal rate 12AM 2.75 units/h  12 PM 3 units/h  65% time in range  92% in auto mode  No hypoglycemia  Carb ratio was 3 sensitivity was 8)     Past Medical History:   Diagnosis Date    Erectile dysfunction     Focal nodular hyperplasia of liver 2/13/2013    History of cardiovascular stress test 2007    History of Doppler ultrasound     of liver showed hyperplasia.  Hyperlipidemia     Hypertension     Low testosterone     Type II or unspecified type diabetes mellitus without mention of complication, not stated as uncontrolled 2005     Past Surgical History:   Procedure Laterality Date    CARDIAC CATHETERIZATION  2007    100% occlusion of first lateral branch of circumflex. showing 100% distal occlusion of small blood vessel: mild disease. Social History     Socioeconomic History    Marital status:      Spouse name: Not on file    Number of children: Not on file    Years of education: Not on file    Highest education level: Not on file   Occupational History    Not on file   Tobacco Use    Smoking status: Never Smoker    Smokeless tobacco: Never Used   Substance and Sexual Activity    Alcohol use: Not on file    Drug use: Not on file    Sexual activity: Not on file   Other Topics Concern    Not on file   Social History Narrative    Not on file     Social Determinants of Health     Financial Resource Strain:     Difficulty of Paying Living Expenses: Not on file   Food Insecurity:     Worried About Running Out of Food in the Last Year: Not on file    Mary of Food in the Last Year: Not on file   Transportation Needs:     Lack of Transportation (Medical): Not on file    Lack of Transportation (Non-Medical): Not on file   Physical Activity:     Days of Exercise per Week: Not on file    Minutes of Exercise per Session: Not on file   Stress:     Feeling of Stress : Not on file   Social Connections:     Frequency of Communication with Friends and Family: Not on file    Frequency of Social Gatherings with Friends and Family: Not on file    Attends Holiness Services: Not on file    Active Member of [address] [place] [address] [employer] or Organizations: Not on file    Attends Club or Organization Meetings: Not on file    Marital Status: Not on file   Intimate Partner Violence:     Fear of Current or Ex-Partner: Not on file    Emotionally Abused: Not on file    Physically Abused: Not on file    Sexually Abused: Not on file   Housing Stability:     Unable to Pay for Housing in the Last Year: Not on file    Number of [place] in the Last Year: Not on file    Unstable Housing in the Last Year: Not on file     No family history on file.   Allergies   Allergen Reactions    Dye [Barium-Containing Compounds] Hives       Current Outpatient Medications:     Semaglutide (RYBELSUS) 7 MG TABS, Once a day, Disp: 90 tablet, Rfl: 03    Semaglutide (RYBELSUS) 3 MG TABS, Once a day, Disp: 90 tablet, Rfl: 03    insulin aspart (NOVOLOG) 100 UNIT/ML injection vial, USE VIA INSULIN PUMP, MAX DAILY DOSE 200 UNITS, Disp: 70 mL, Rfl: 5    atorvastatin (LIPITOR) 40 MG tablet, TAKE 1 TABLET BY MOUTH EVERY DAY, Disp: 30 tablet, Rfl: 5    sildenafil (VIAGRA) 100 MG tablet, Take 1 tablet by mouth as needed for Erectile Dysfunction, Disp: 30 tablet, Rfl: 3    Accu-Chek FastClix Lancets MISC, Test 4x daily, Disp: 150 each, Rfl: 3    blood glucose test strips (ACCU-CHEK GUIDE) strip, Test 4x daily, Disp: 150 each, Rfl: 3    Insulin Pen Needle (NOVOFINE) 32G X 6 MM MISC, Tid, Disp: 300 each, Rfl: 3    allopurinol (ZYLOPRIM) 300 MG tablet, TAKE 1 TABLET BY MOUTH EVERY DAY, Disp: 90 tablet, Rfl: 1    benazepril (LOTENSIN) 20 MG tablet, TAKE 1 TABLET BY MOUTH EVERY DAY, Disp: 90 tablet, Rfl: 1    omega-3 acid ethyl esters (LOVAZA) 1 g capsule, Take 2 capsules by mouth 2 times daily, Disp: 360 capsule, Rfl: 3    carvedilol (COREG) 6.25 MG tablet, Take 6.25 mg by mouth 2 times daily (with meals). , Disp: , Rfl:     gabapentin (NEURONTIN) 400 MG capsule, TAKE 1 CAPSULE BY MOUTH FOUR TIMES A DAY, Disp: 120 capsule, Rfl: 3    indomethacin (INDOCIN) 50 MG capsule, Take 1 capsule by mouth 2 times daily (with meals) for 10 days, Disp: 20 capsule, Rfl: 3    colchicine (COLCRYS) 0.6 MG tablet, Take 1 tablet by mouth 2 times daily for 10 days, Disp: 20 tablet, Rfl: 2    Testosterone (ANDROGEL PUMP) 20.25 MG/ACT (1.62%) GEL gel, Apply 2 pump depressions daily, Disp: 75 g, Rfl: 2    testosterone (ANDROGEL; TESTIM) 50 MG/5GM (1%) GEL 1% gel, Apply 2 Tubes topically daily for 30 days. , Disp: 180 each, Rfl: 3    gabapentin (NEURONTIN) 400 MG capsule, TAKE 1 CAPSULE BY MOUTH 3 TIMES DAILY, Disp: 90 capsule, Rfl: 3  Lab Results   Component Value Date     08/26/2019    K 3.9 08/26/2019    CL 95 08/26/2019    CO2 26 08/26/2019    BUN 14 08/26/2019    CREATININE 0.74 08/26/2019    GLUCOSE 112 06/15/2022    CALCIUM 10.0 (H) 08/26/2019    PROT 8.0 10/07/2015    LABALBU 4.4 10/07/2015    BILITOT 0.5 10/07/2015    ALKPHOS 223 (H) 10/07/2015    AST 24 10/07/2015    ALT 36 10/07/2015    LABGLOM >60.0 08/26/2019    GFRAA >60.0 08/26/2019     No results found for: WBC, HGB, HCT, MCV, PLT  Lab Results   Component Value Date    LABA1C 7.4 06/15/2022    LABA1C 7.5 03/04/2022    LABA1C 7.9 12/03/2021     Lab Results   Component Value Date    HDL 31 (L) 03/09/2016    HDL 34 (L) 10/07/2015    HDL 32 (L) 12/05/2014    LDLCALC 55 03/09/2016    LDLCALC 68 10/07/2015    LDLCALC 85 12/05/2014    CHOL 151 03/09/2016    CHOL 157 10/07/2015    CHOL 159 12/05/2014    TRIG 324 (H) 03/09/2016    TRIG 273 (H) 10/07/2015    TRIG 211 (H) 12/05/2014     Lab Results   Component Value Date    TESTM 137 (L) 08/26/2019     No results found for: TSH, TSHREFLEX, FT3, T4FREE  No results found for: TPOABS    Review of Systems   Eyes: Negative. Cardiovascular: Negative. Endocrine: Negative for polydipsia and polyuria. Genitourinary: Positive for impotence. Musculoskeletal: Positive for gait problem and joint swelling. All other systems reviewed and are negative. Objective:   Physical Exam  Vitals reviewed. Constitutional:       General: He is not in acute distress. Appearance: Normal appearance. He is obese. HENT:      Head: Normocephalic and atraumatic. Right Ear: External ear normal.      Left Ear: External ear normal.      Nose: Nose normal.   Eyes:      General: No scleral icterus. Right eye: No discharge. Left eye: No discharge. Extraocular Movements: Extraocular movements intact. Conjunctiva/sclera: Conjunctivae normal.   Cardiovascular:      Rate and Rhythm: Normal rate.    Pulmonary:      Effort: Pulmonary effort is normal.   Musculoskeletal:         General: Normal range of motion. Cervical back: Normal range of motion and neck supple. Right lower leg: No edema. Left lower leg: No edema. Neurological:      General: No focal deficit present. Mental Status: He is alert and oriented to person, place, and time.    Psychiatric:         Mood and Affect: Mood normal.         Behavior: Behavior normal.

## 2022-07-27 RX ORDER — ATORVASTATIN CALCIUM 40 MG/1
TABLET, FILM COATED ORAL
Qty: 30 TABLET | Refills: 5 | Status: SHIPPED | OUTPATIENT
Start: 2022-07-27 | End: 2022-09-02 | Stop reason: SDUPTHER

## 2022-08-22 RX ORDER — GABAPENTIN 400 MG/1
CAPSULE ORAL
Qty: 120 CAPSULE | Refills: 3 | Status: SHIPPED | OUTPATIENT
Start: 2022-08-22 | End: 2022-10-17

## 2022-09-02 RX ORDER — ATORVASTATIN CALCIUM 40 MG/1
TABLET, FILM COATED ORAL
Qty: 30 TABLET | Refills: 5 | Status: SHIPPED | OUTPATIENT
Start: 2022-09-02

## 2022-10-17 ENCOUNTER — OFFICE VISIT (OUTPATIENT)
Dept: ENDOCRINOLOGY | Age: 57
End: 2022-10-17
Payer: COMMERCIAL

## 2022-10-17 VITALS
DIASTOLIC BLOOD PRESSURE: 78 MMHG | SYSTOLIC BLOOD PRESSURE: 113 MMHG | HEART RATE: 93 BPM | BODY MASS INDEX: 34.39 KG/M2 | WEIGHT: 268 LBS | HEIGHT: 74 IN | OXYGEN SATURATION: 94 %

## 2022-10-17 DIAGNOSIS — E29.1 HYPOGONADISM IN MALE: ICD-10-CM

## 2022-10-17 DIAGNOSIS — E11.42 DIABETIC POLYNEUROPATHY ASSOCIATED WITH TYPE 2 DIABETES MELLITUS (HCC): Primary | ICD-10-CM

## 2022-10-17 DIAGNOSIS — Z46.81 INSULIN PUMP TITRATION: ICD-10-CM

## 2022-10-17 LAB
CHP ED QC CHECK: ABNORMAL
GLUCOSE BLD-MCNC: 204 MG/DL

## 2022-10-17 PROCEDURE — 3017F COLORECTAL CA SCREEN DOC REV: CPT | Performed by: INTERNAL MEDICINE

## 2022-10-17 PROCEDURE — 95251 CONT GLUC MNTR ANALYSIS I&R: CPT | Performed by: INTERNAL MEDICINE

## 2022-10-17 PROCEDURE — G8417 CALC BMI ABV UP PARAM F/U: HCPCS | Performed by: INTERNAL MEDICINE

## 2022-10-17 PROCEDURE — G8427 DOCREV CUR MEDS BY ELIG CLIN: HCPCS | Performed by: INTERNAL MEDICINE

## 2022-10-17 PROCEDURE — G8484 FLU IMMUNIZE NO ADMIN: HCPCS | Performed by: INTERNAL MEDICINE

## 2022-10-17 PROCEDURE — 1036F TOBACCO NON-USER: CPT | Performed by: INTERNAL MEDICINE

## 2022-10-17 PROCEDURE — 3052F HG A1C>EQUAL 8.0%<EQUAL 9.0%: CPT | Performed by: INTERNAL MEDICINE

## 2022-10-17 PROCEDURE — 2022F DILAT RTA XM EVC RTNOPTHY: CPT | Performed by: INTERNAL MEDICINE

## 2022-10-17 PROCEDURE — 99214 OFFICE O/P EST MOD 30 MIN: CPT | Performed by: INTERNAL MEDICINE

## 2022-10-17 RX ORDER — INSULIN ASPART 100 [IU]/ML
INJECTION, SOLUTION INTRAVENOUS; SUBCUTANEOUS
COMMUNITY
Start: 2022-09-22

## 2022-10-17 RX ORDER — GABAPENTIN 600 MG/1
TABLET ORAL
COMMUNITY
Start: 2022-09-20

## 2022-10-17 RX ORDER — METOPROLOL SUCCINATE 25 MG/1
TABLET, EXTENDED RELEASE ORAL
COMMUNITY
Start: 2022-07-26

## 2022-10-17 RX ORDER — LISINOPRIL 10 MG/1
TABLET ORAL
COMMUNITY
Start: 2022-04-01

## 2022-10-17 RX ORDER — TESTOSTERONE GEL, 1% 10 MG/G
2 GEL TRANSDERMAL DAILY
Qty: 180 EACH | Refills: 3 | Status: SHIPPED | OUTPATIENT
Start: 2022-10-17 | End: 2023-01-15

## 2022-10-17 NOTE — PROGRESS NOTES
10/17/2022    Assessment:       Diagnosis Orders   1. Diabetic polyneuropathy associated with type 2 diabetes mellitus (HCC)  POCT Glucose    Basic Metabolic Panel    Hemoglobin A1C    HM DIABETES FOOT EXAM      2. Hypogonadism in male  Testosterone, free, total    testosterone (ANDROGEL; TESTIM) 50 MG/5GM (1%) GEL 1% gel      3. Insulin pump titration              PLAN:     Insulin pump adjusted titrated basal rate increased to 3.5 units/h continue other settings with insulin to carb ratio  Repeat labs in 3 months A1c goal of 7 or lower more than 50% of 30 minutes spent in patient education counseling  Continue current dose of AndroGel  Patient to follow-up with podiatrist [facility] system  Orders Placed This Encounter   Procedures    Basic Metabolic Panel     Standing Status:   Future     Standing Expiration Date:   10/17/2023    Hemoglobin A1C     Standing Status:   Future     Standing Expiration Date:   10/17/2023    Testosterone, free, total     Standing Status:   Future     Standing Expiration Date:   10/17/2023    POCT Glucose    HM DIABETES FOOT EXAM     Orders Placed This Encounter   Medications    testosterone (ANDROGEL; TESTIM) 50 MG/5GM (1%) GEL 1% gel     Sig: Apply 2 Tubes topically daily for 90 days. Dispense:  180 each     Refill:  3         Orders Placed This Encounter   Procedures    POCT Glucose     No orders of the defined types were placed in this encounter. No follow-ups on file.   Subjective:     Chief Complaint   Patient presents with    Diabetes    Hypogonadism     Vitals:    10/17/22 1450   BP: 113/78   Site: Left Upper Arm   Position: Sitting   Cuff Size: Large Adult   Pulse: 93   SpO2: 94%   Weight: 268 lb (121.6 kg)   Height: 6' 2\" (1.88 m)     Wt Readings from Last 3 Encounters:   10/17/22 268 lb (121.6 kg)   06/15/22 265 lb (120.2 kg)   03/04/22 260 lb (117.9 kg)     BP Readings from Last 3 Encounters:   10/17/22 113/78   06/15/22 (!) 144/86   03/04/22 111/71 Follow-up on type 2 diabetes complications include neuropathy heart disease Charcot's foot left side patient is seen podiatrist/orthopedics to [facility] clinic plan to follow-up with podiatry through [facility]  Patient currently on insulin pump therapy A1c was 8.0  Pump was downloaded reviewed currently on Medtronic pump with sensor continuous glucose monitoring  History of hypogonadism on intermittent testosterone replacement through gels    Diabetes  He presents for his follow-up diabetic visit. He has type 2 diabetes mellitus. Associated symptoms include fatigue. Pertinent negatives for diabetes include no polyuria and no weight loss. Symptoms are stable. Diabetic complications include heart disease, impotence and peripheral neuropathy. Current diabetic treatment includes insulin pump. Meal planning includes carbohydrate counting. His overall blood glucose range is 180-200 mg/dl. (Hemoglobin A1C       Date                     Value               Ref Range           Status                09/20/2022               8.0 (A)             %                   Final            ----------    Review 2-week pump download average blood sugar 190±49  92% in automatic mode    65% in range  30% high  5% very high    No hypoglycemia    Basal rate 12 AM 2.75 units/h 12 PM 3 units/h  Carb ratio was 3 sensitivity 8)   Other  This is a chronic (Hypogonadism) problem. The current episode started more than 1 year ago. The problem has been waxing and waning. Associated symptoms include fatigue. Treatments tried: AndroGel. Past Medical History:   Diagnosis Date    Erectile dysfunction     Focal nodular hyperplasia of liver 2/13/2013    History of cardiovascular stress test 2007    History of Doppler ultrasound     of liver showed hyperplasia.     Hyperlipidemia     Hypertension     Low testosterone     Type II or unspecified type diabetes mellitus without mention of complication, not stated as uncontrolled 2005     Past Surgical History:   Procedure Laterality Date    CARDIAC CATHETERIZATION  2007    100% occlusion of first lateral branch of circumflex. showing 100% distal occlusion of small blood vessel: mild disease. Social History     Socioeconomic History    Marital status:      Spouse name: Not on file    Number of children: Not on file    Years of education: Not on file    Highest education level: Not on file   Occupational History    Not on file   Tobacco Use    Smoking status: Never    Smokeless tobacco: Never   Substance and Sexual Activity    Alcohol use: Not on file    Drug use: Not on file    Sexual activity: Not on file   Other Topics Concern    Not on file   Social History Narrative    Not on file     Social Determinants of Health     Financial Resource Strain: Not on file   Food Insecurity: Not on file   Transportation Needs: Not on file   Physical Activity: Not on file   Stress: Not on file   Social Connections: Not on file   Intimate Partner Violence: Not on file   Housing Stability: Not on file     No family history on file.   Allergies   Allergen Reactions    Dye [Barium-Containing Compounds] Hives       Current Outpatient Medications:     gabapentin (NEURONTIN) 600 MG tablet, TAKE 1 TABLET BY MOUTH FOUR TIMES A DAY, Disp: , Rfl:     lisinopril (PRINIVIL;ZESTRIL) 10 MG tablet, Take by mouth, Disp: , Rfl:     metoprolol succinate (TOPROL XL) 25 MG extended release tablet, TAKE 1 TABLET BY MOUTH EVERY DAY, Disp: , Rfl:     NOVOLOG 100 UNIT/ML injection vial, USE VIA INSULIN PUMP, MAX DAILY DOSE 100 UNITS, Disp: , Rfl:     atorvastatin (LIPITOR) 40 MG tablet, TAKE 1 TABLET BY MOUTH EVERY DAY, Disp: 30 tablet, Rfl: 5    Semaglutide (RYBELSUS) 7 MG TABS, Once a day, Disp: 90 tablet, Rfl: 03    Semaglutide (RYBELSUS) 3 MG TABS, Once a day, Disp: 90 tablet, Rfl: 03    sildenafil (VIAGRA) 100 MG tablet, Take 1 tablet by mouth as needed for Erectile Dysfunction, Disp: 30 tablet, Rfl: 3    Accu-Chek FastClix Lancets MISC, Test 4x daily, Disp: 150 each, Rfl: 3    blood glucose test strips (ACCU-CHEK GUIDE) strip, Test 4x daily, Disp: 150 each, Rfl: 3    Insulin Pen Needle (NOVOFINE) 32G X 6 MM MISC, Tid, Disp: 300 each, Rfl: 3    allopurinol (ZYLOPRIM) 300 MG tablet, TAKE 1 TABLET BY MOUTH EVERY DAY, Disp: 90 tablet, Rfl: 1    benazepril (LOTENSIN) 20 MG tablet, TAKE 1 TABLET BY MOUTH EVERY DAY, Disp: 90 tablet, Rfl: 1    omega-3 acid ethyl esters (LOVAZA) 1 g capsule, Take 2 capsules by mouth 2 times daily, Disp: 360 capsule, Rfl: 3    carvedilol (COREG) 6.25 MG tablet, Take 6.25 mg by mouth 2 times daily (with meals). , Disp: , Rfl:     indomethacin (INDOCIN) 50 MG capsule, Take 1 capsule by mouth 2 times daily (with meals) for 10 days, Disp: 20 capsule, Rfl: 3    colchicine (COLCRYS) 0.6 MG tablet, Take 1 tablet by mouth 2 times daily for 10 days, Disp: 20 tablet, Rfl: 2    Testosterone (ANDROGEL PUMP) 20.25 MG/ACT (1.62%) GEL gel, Apply 2 pump depressions daily, Disp: 75 g, Rfl: 2    testosterone (ANDROGEL; TESTIM) 50 MG/5GM (1%) GEL 1% gel, Apply 2 Tubes topically daily for 30 days. , Disp: 180 each, Rfl: 3  Lab Results   Component Value Date     09/20/2022    K 4.0 09/20/2022     09/20/2022    CO2 26 08/26/2019    BUN 14 08/26/2019    CREATININE 1.01 09/20/2022    GLUCOSE 204 (H) 10/17/2022    CALCIUM 9.3 09/20/2022    PROT 8.3 (H) 09/20/2022    LABALBU 10.2 09/20/2022    LABALBU 4.3 09/20/2022    BILITOT 0.5 09/20/2022    ALKPHOS 95 09/20/2022    AST 33 09/20/2022    ALT 48 09/20/2022    LABGLOM >60.0 08/26/2019    GFRAA >60.0 08/26/2019     Lab Results   Component Value Date    WBC 9.2 09/20/2022    HGB 14.2 09/20/2022    HCT 41.3 09/20/2022    MCV 88 09/20/2022     09/20/2022     Lab Results   Component Value Date    LABA1C 8.0 (A) 09/20/2022    LABA1C 7.4 06/15/2022    LABA1C 7.5 03/04/2022     Lab Results   Component Value Date    HDL 29.0 (A) 09/20/2022    HDL 31 (L) 03/09/2016    HDL 34 (L) 10/07/2015    LDLCALC 55 03/09/2016    LDLCALC 68 10/07/2015    LDLCALC 85 12/05/2014    CHOL 147 09/20/2022    CHOL 151 03/09/2016    CHOL 157 10/07/2015    TRIG 427 (H) 09/20/2022    TRIG 324 (H) 03/09/2016    TRIG 273 (H) 10/07/2015     Lab Results   Component Value Date    TESTM 137 (L) 08/26/2019     No results found for: TSH, TSHREFLEX, FT3, T4FREE  No results found for: TPOABS    Review of Systems   Constitutional:  Positive for fatigue. Negative for weight loss. Eyes: Negative. Cardiovascular: Negative. Endocrine: Negative for polyuria. Genitourinary:  Positive for impotence. Neurological: Negative. All other systems reviewed and are negative. Objective:   Physical Exam  Vitals reviewed. Constitutional:       General: He is not in acute distress. Appearance: Normal appearance. He is obese. HENT:      Head: Normocephalic and atraumatic. Right Ear: External ear normal.      Left Ear: External ear normal.      Nose: Nose normal.   Eyes:      General: No scleral icterus. Right eye: No discharge. Left eye: No discharge. Extraocular Movements: Extraocular movements intact. Conjunctiva/sclera: Conjunctivae normal.   Cardiovascular:      Rate and Rhythm: Normal rate. Pulmonary:      Effort: Pulmonary effort is normal.   Musculoskeletal:         General: Normal range of motion. Cervical back: Normal range of motion and neck supple. Feet:    Neurological:      General: No focal deficit present. Mental Status: He is alert and oriented to person, place, and time.    Psychiatric:         Mood and Affect: Mood normal.         Behavior: Behavior normal.

## 2022-10-19 ASSESSMENT — ENCOUNTER SYMPTOMS: EYES NEGATIVE: 1

## 2023-01-03 RX ORDER — INSULIN ASPART 100 [IU]/ML
INJECTION, SOLUTION INTRAVENOUS; SUBCUTANEOUS
Qty: 180 ML | Refills: 2 | Status: SHIPPED | OUTPATIENT
Start: 2023-01-03

## 2023-01-03 NOTE — TELEPHONE ENCOUNTER
Comments: This request is coming from the pharmacy. Last Office Visit (last PCP visit):   10/17/2022    Next Visit Date:  Future Appointments   Date Time Provider Lilo Ramirez   1/17/2023  9:00 AM Esmer Hernandez MD [facility]       If hasn't been seen in over a year OR hasn't followed up according to last diabetes/ADHD visit, make appointment for patient before sending refill to provider.     Rx requested:  Requested Prescriptions     Pending Prescriptions Disp Refills    NOVOLOG 100 UNIT/ML injection vial [Pharmacy Med Name: NOVOLOG 100 UNIT/ML VIAL] 180 mL 2     Sig: USE VIA INSULIN PUMP, MAX DAILY DOSE 200 UNITS

## 2023-01-17 ENCOUNTER — OFFICE VISIT (OUTPATIENT)
Dept: ENDOCRINOLOGY | Age: 58
End: 2023-01-17

## 2023-01-17 VITALS
HEIGHT: 74 IN | WEIGHT: 268 LBS | BODY MASS INDEX: 34.39 KG/M2 | HEART RATE: 89 BPM | SYSTOLIC BLOOD PRESSURE: 109 MMHG | DIASTOLIC BLOOD PRESSURE: 63 MMHG | OXYGEN SATURATION: 95 %

## 2023-01-17 DIAGNOSIS — E11.42 DIABETIC POLYNEUROPATHY ASSOCIATED WITH TYPE 2 DIABETES MELLITUS (HCC): Primary | ICD-10-CM

## 2023-01-17 DIAGNOSIS — E29.1 HYPOGONADISM IN MALE: ICD-10-CM

## 2023-01-17 LAB
CHP ED QC CHECK: NORMAL
GLUCOSE BLD-MCNC: 137 MG/DL
HBA1C MFR BLD: 8.1 %

## 2023-01-17 NOTE — PROGRESS NOTES
1/17/2023    Assessment:       Diagnosis Orders   1. Diabetic polyneuropathy associated with type 2 diabetes mellitus (HCC)  POCT Glucose    POCT glycosylated hemoglobin (Hb A1C)      2. Hypogonadism in male              PLAN:     Orders Placed This Encounter   Procedures    Testosterone, free, total     Standing Status:   Future     Standing Expiration Date:   1/17/2024    Hemoglobin A1C     Standing Status:   Future     Standing Expiration Date:   1/05/3166    Basic Metabolic Panel     Standing Status:   Future     Standing Expiration Date:   1/17/2024    POCT Glucose    POCT glycosylated hemoglobin (Hb A1C)     Continue patient on current pump setting continue current dose of testosterone replacement follow-up in 3 months  A1c goal of 7 or lower  Orders Placed This Encounter   Procedures    POCT Glucose    POCT glycosylated hemoglobin (Hb A1C)     No orders of the defined types were placed in this encounter. No follow-ups on file. Subjective:     Chief Complaint   Patient presents with    Diabetes    Hypogonadism     Vitals:    01/17/23 0946   BP: 109/63   Pulse: 89   SpO2: 95%   Weight: 268 lb (121.6 kg)   Height: 6' 2\" (1.88 m)     Wt Readings from Last 3 Encounters:   01/17/23 268 lb (121.6 kg)   10/17/22 268 lb (121.6 kg)   06/15/22 265 lb (120.2 kg)     BP Readings from Last 3 Encounters:   01/17/23 109/63   10/17/22 113/78   06/15/22 (!) 144/86     Follow-up on type 2 diabetes history of hypogonadism patient currently on insulin pump pump was downloaded reviewed  Hemoglobin A1c stable at 8.1 history of erectile dysfunction Charcot's foot coronary artery disease  History of hypogonadism on testosterone replacement with AndroGel  Pump download was reviewed blood sugars fluctuating between 171±36  86% in automatic mode using sensor  69% in range 29% was high 2% very high  Basal rate 3.5 units/h carb ratio 3 sensitivity was 6    Diabetes  He presents for his follow-up diabetic visit.  He has type 2 diabetes mellitus. Pertinent negatives for diabetes include no polydipsia and no polyuria. There are no hypoglycemic complications. Symptoms are stable. Diabetic complications include heart disease and impotence. Current diabetic treatment includes insulin pump. Meal planning includes carbohydrate counting. His overall blood glucose range is 180-200 mg/dl. (Hemoglobin A1C       Date                     Value               Ref Range           Status                01/17/2023               8.1                 %                   Final            ----------  ) An ACE inhibitor/angiotensin II receptor blocker is being taken. Past Medical History:   Diagnosis Date    Erectile dysfunction     Focal nodular hyperplasia of liver 2/13/2013    History of cardiovascular stress test 2007    History of Doppler ultrasound     of liver showed hyperplasia. Hyperlipidemia     Hypertension     Low testosterone     Type II or unspecified type diabetes mellitus without mention of complication, not stated as uncontrolled 2005     Past Surgical History:   Procedure Laterality Date    CARDIAC CATHETERIZATION  2007    100% occlusion of first lateral branch of circumflex. showing 100% distal occlusion of small blood vessel: mild disease.      Social History     Socioeconomic History    Marital status:      Spouse name: Not on file    Number of children: Not on file    Years of education: Not on file    Highest education level: Not on file   Occupational History    Not on file   Tobacco Use    Smoking status: Never    Smokeless tobacco: Never   Substance and Sexual Activity    Alcohol use: Not on file    Drug use: Not on file    Sexual activity: Not on file   Other Topics Concern    Not on file   Social History Narrative    Not on file     Social Determinants of Health     Financial Resource Strain: Not on file   Food Insecurity: Not on file   Transportation Needs: Not on file   Physical Activity: Not on file   Stress: Not on file   Social Connections: Not on file   Intimate Partner Violence: Not on file   Housing Stability: Not on file     No family history on file. Allergies   Allergen Reactions    Dye [Barium-Containing Compounds] Hives       Current Outpatient Medications:     NOVOLOG 100 UNIT/ML injection vial, USE VIA INSULIN PUMP, MAX DAILY DOSE 200 UNITS, Disp: 180 mL, Rfl: 2    gabapentin (NEURONTIN) 600 MG tablet, TAKE 1 TABLET BY MOUTH FOUR TIMES A DAY, Disp: , Rfl:     lisinopril (PRINIVIL;ZESTRIL) 10 MG tablet, Take by mouth, Disp: , Rfl:     metoprolol succinate (TOPROL XL) 25 MG extended release tablet, TAKE 1 TABLET BY MOUTH EVERY DAY, Disp: , Rfl:     atorvastatin (LIPITOR) 40 MG tablet, TAKE 1 TABLET BY MOUTH EVERY DAY, Disp: 30 tablet, Rfl: 5    Semaglutide (RYBELSUS) 7 MG TABS, Once a day, Disp: 90 tablet, Rfl: 03    Semaglutide (RYBELSUS) 3 MG TABS, Once a day, Disp: 90 tablet, Rfl: 03    sildenafil (VIAGRA) 100 MG tablet, Take 1 tablet by mouth as needed for Erectile Dysfunction, Disp: 30 tablet, Rfl: 3    Accu-Chek FastClix Lancets MISC, Test 4x daily, Disp: 150 each, Rfl: 3    blood glucose test strips (ACCU-CHEK GUIDE) strip, Test 4x daily, Disp: 150 each, Rfl: 3    Insulin Pen Needle (NOVOFINE) 32G X 6 MM MISC, Tid, Disp: 300 each, Rfl: 3    allopurinol (ZYLOPRIM) 300 MG tablet, TAKE 1 TABLET BY MOUTH EVERY DAY, Disp: 90 tablet, Rfl: 1    benazepril (LOTENSIN) 20 MG tablet, TAKE 1 TABLET BY MOUTH EVERY DAY, Disp: 90 tablet, Rfl: 1    omega-3 acid ethyl esters (LOVAZA) 1 g capsule, Take 2 capsules by mouth 2 times daily, Disp: 360 capsule, Rfl: 3    carvedilol (COREG) 6.25 MG tablet, Take 6.25 mg by mouth 2 times daily (with meals). , Disp: , Rfl:     testosterone (ANDROGEL; TESTIM) 50 MG/5GM (1%) GEL 1% gel, Apply 2 Tubes topically daily for 90 days. , Disp: 180 each, Rfl: 3    indomethacin (INDOCIN) 50 MG capsule, Take 1 capsule by mouth 2 times daily (with meals) for 10 days, Disp: 20 capsule, Rfl: 3    colchicine (COLCRYS) 0.6 MG tablet, Take 1 tablet by mouth 2 times daily for 10 days, Disp: 20 tablet, Rfl: 2    Testosterone (ANDROGEL PUMP) 20.25 MG/ACT (1.62%) GEL gel, Apply 2 pump depressions daily, Disp: 75 g, Rfl: 2  Lab Results   Component Value Date     09/20/2022    K 4.0 09/20/2022     09/20/2022    CO2 26 08/26/2019    BUN 14 08/26/2019    CREATININE 1.01 09/20/2022    GLUCOSE 137 01/17/2023    CALCIUM 9.3 09/20/2022    PROT 8.3 (H) 09/20/2022    LABALBU 10.2 09/20/2022    LABALBU 4.3 09/20/2022    BILITOT 0.5 09/20/2022    ALKPHOS 95 09/20/2022    AST 33 09/20/2022    ALT 48 09/20/2022    LABGLOM >60.0 08/26/2019    GFRAA >60.0 08/26/2019     Lab Results   Component Value Date    WBC 9.2 09/20/2022    HGB 14.2 09/20/2022    HCT 41.3 09/20/2022    MCV 88 09/20/2022     09/20/2022     Lab Results   Component Value Date    LABA1C 8.1 01/17/2023    LABA1C 8.0 (A) 09/20/2022    LABA1C 7.4 06/15/2022     Lab Results   Component Value Date    HDL 29.0 (A) 09/20/2022    HDL 31 (L) 03/09/2016    HDL 34 (L) 10/07/2015    LDLCALC 55 03/09/2016    LDLCALC 68 10/07/2015    LDLCALC 85 12/05/2014    CHOL 147 09/20/2022    CHOL 151 03/09/2016    CHOL 157 10/07/2015    TRIG 427 (H) 09/20/2022    TRIG 324 (H) 03/09/2016    TRIG 273 (H) 10/07/2015     Lab Results   Component Value Date    TESTM 137 (L) 08/26/2019     No results found for: TSH, TSHREFLEX, FT3, T4FREE  No results found for: TPOABS    Review of Systems   Eyes: Negative. Cardiovascular: Negative. Endocrine: Negative. Negative for polydipsia and polyuria. Genitourinary:  Positive for impotence. All other systems reviewed and are negative. Objective:   Physical Exam  Vitals reviewed. Constitutional:       General: He is not in acute distress. Appearance: Normal appearance. He is obese. HENT:      Head: Normocephalic and atraumatic.       Right Ear: External ear normal.      Left Ear: External ear normal.      Nose: Nose normal.   Eyes: General: No scleral icterus. Right eye: No discharge. Left eye: No discharge. Extraocular Movements: Extraocular movements intact. Conjunctiva/sclera: Conjunctivae normal.   Cardiovascular:      Rate and Rhythm: Normal rate. Pulmonary:      Effort: Pulmonary effort is normal.   Musculoskeletal:         General: Normal range of motion. Cervical back: Normal range of motion and neck supple. Neurological:      General: No focal deficit present. Mental Status: He is alert and oriented to person, place, and time.    Psychiatric:         Mood and Affect: Mood normal.         Behavior: Behavior normal.

## 2023-01-22 ASSESSMENT — ENCOUNTER SYMPTOMS: EYES NEGATIVE: 1

## 2023-02-12 PROBLEM — I10 ESSENTIAL HYPERTENSION: Status: ACTIVE | Noted: 2023-02-12

## 2023-02-12 PROBLEM — E11.9 TYPE 2 DIABETES MELLITUS (MULTI): Status: ACTIVE | Noted: 2023-02-12

## 2023-02-12 PROBLEM — S92.909A FOOT FRACTURE: Status: ACTIVE | Noted: 2023-02-12

## 2023-02-12 PROBLEM — E78.5 HYPERLIPIDEMIA: Status: ACTIVE | Noted: 2023-02-12

## 2023-02-12 PROBLEM — E29.1 HYPOGONADISM MALE: Status: ACTIVE | Noted: 2023-02-12

## 2023-02-12 PROBLEM — M79.672 LEFT FOOT PAIN: Status: ACTIVE | Noted: 2023-02-12

## 2023-02-12 PROBLEM — E66.9 CLASS 1 OBESITY WITH BODY MASS INDEX (BMI) OF 33.0 TO 33.9 IN ADULT: Status: ACTIVE | Noted: 2023-02-12

## 2023-02-12 PROBLEM — I25.10 CORONARY ARTERY DISEASE INVOLVING NATIVE CORONARY ARTERY OF NATIVE HEART WITHOUT ANGINA PECTORIS: Status: ACTIVE | Noted: 2023-02-12

## 2023-02-12 PROBLEM — L98.9 SKIN LESION OF CHEST WALL: Status: ACTIVE | Noted: 2023-02-12

## 2023-02-12 PROBLEM — I25.2 HISTORY OF MI (MYOCARDIAL INFARCTION): Status: ACTIVE | Noted: 2023-02-12

## 2023-02-12 PROBLEM — M25.579 ANKLE PAIN: Status: ACTIVE | Noted: 2023-02-12

## 2023-02-12 PROBLEM — M10.9 GOUT: Status: ACTIVE | Noted: 2023-02-12

## 2023-02-12 PROBLEM — E66.811 CLASS 1 OBESITY WITH BODY MASS INDEX (BMI) OF 33.0 TO 33.9 IN ADULT: Status: ACTIVE | Noted: 2023-02-12

## 2023-02-12 PROBLEM — R74.8 ABNORMAL LIVER ENZYMES: Status: ACTIVE | Noted: 2023-02-12

## 2023-02-12 PROBLEM — G62.9 NEUROPATHY: Status: ACTIVE | Noted: 2023-02-12

## 2023-02-12 RX ORDER — GABAPENTIN 600 MG/1
600 TABLET ORAL 4 TIMES DAILY
COMMUNITY
Start: 2022-09-20 | End: 2023-08-24 | Stop reason: DRUGHIGH

## 2023-02-12 RX ORDER — LISINOPRIL 10 MG/1
1 TABLET ORAL DAILY
COMMUNITY
Start: 2022-04-01 | End: 2023-03-13

## 2023-02-12 RX ORDER — ALLOPURINOL 300 MG/1
1 TABLET ORAL DAILY
COMMUNITY
End: 2023-03-28

## 2023-02-12 RX ORDER — METOPROLOL SUCCINATE 50 MG/1
1 TABLET, EXTENDED RELEASE ORAL DAILY
COMMUNITY
Start: 2022-06-06 | End: 2023-10-12 | Stop reason: HOSPADM

## 2023-02-12 RX ORDER — INSULIN ASPART 100 [IU]/ML
INJECTION, SUSPENSION SUBCUTANEOUS
Status: ON HOLD | COMMUNITY
End: 2023-10-06 | Stop reason: ALTCHOICE

## 2023-02-12 RX ORDER — TESTOSTERONE 10 MG/.5G
GEL, METERED TOPICAL
Status: ON HOLD | COMMUNITY
End: 2023-10-06 | Stop reason: ALTCHOICE

## 2023-02-12 RX ORDER — ASPIRIN 81 MG/1
1 TABLET ORAL DAILY
COMMUNITY

## 2023-02-12 RX ORDER — ATORVASTATIN CALCIUM 40 MG/1
1 TABLET, FILM COATED ORAL DAILY
Status: ON HOLD | COMMUNITY
End: 2023-10-12 | Stop reason: SDUPTHER

## 2023-03-12 DIAGNOSIS — I10 ESSENTIAL HYPERTENSION: Primary | ICD-10-CM

## 2023-03-13 RX ORDER — LISINOPRIL 10 MG/1
TABLET ORAL
Qty: 30 TABLET | Refills: 11 | Status: SHIPPED | OUTPATIENT
Start: 2023-03-13 | End: 2023-10-12 | Stop reason: HOSPADM

## 2023-03-21 ENCOUNTER — OFFICE VISIT (OUTPATIENT)
Dept: PRIMARY CARE | Facility: CLINIC | Age: 58
End: 2023-03-21
Payer: COMMERCIAL

## 2023-03-21 ENCOUNTER — LAB (OUTPATIENT)
Dept: LAB | Facility: LAB | Age: 58
End: 2023-03-21
Payer: COMMERCIAL

## 2023-03-21 VITALS
TEMPERATURE: 97.8 F | DIASTOLIC BLOOD PRESSURE: 70 MMHG | OXYGEN SATURATION: 96 % | WEIGHT: 269.6 LBS | HEIGHT: 74 IN | BODY MASS INDEX: 34.6 KG/M2 | SYSTOLIC BLOOD PRESSURE: 118 MMHG | HEART RATE: 85 BPM

## 2023-03-21 DIAGNOSIS — E78.2 MIXED HYPERLIPIDEMIA: ICD-10-CM

## 2023-03-21 DIAGNOSIS — E11.9 TYPE 2 DIABETES MELLITUS WITHOUT COMPLICATION, WITHOUT LONG-TERM CURRENT USE OF INSULIN (MULTI): ICD-10-CM

## 2023-03-21 DIAGNOSIS — M1A.09X0 IDIOPATHIC CHRONIC GOUT OF MULTIPLE SITES WITHOUT TOPHUS: ICD-10-CM

## 2023-03-21 DIAGNOSIS — G47.33 OSA (OBSTRUCTIVE SLEEP APNEA): ICD-10-CM

## 2023-03-21 DIAGNOSIS — I25.10 CORONARY ARTERY DISEASE INVOLVING NATIVE CORONARY ARTERY OF NATIVE HEART WITHOUT ANGINA PECTORIS: ICD-10-CM

## 2023-03-21 DIAGNOSIS — E66.09 CLASS 1 OBESITY DUE TO EXCESS CALORIES WITHOUT SERIOUS COMORBIDITY WITH BODY MASS INDEX (BMI) OF 33.0 TO 33.9 IN ADULT: ICD-10-CM

## 2023-03-21 DIAGNOSIS — Z12.11 COLON CANCER SCREENING: Primary | ICD-10-CM

## 2023-03-21 DIAGNOSIS — R79.89 LOW TESTOSTERONE IN MALE: ICD-10-CM

## 2023-03-21 DIAGNOSIS — E11.69 TYPE 2 DIABETES MELLITUS WITH OTHER SPECIFIED COMPLICATION, UNSPECIFIED WHETHER LONG TERM INSULIN USE (MULTI): ICD-10-CM

## 2023-03-21 DIAGNOSIS — I10 ESSENTIAL HYPERTENSION: ICD-10-CM

## 2023-03-21 LAB
ALANINE AMINOTRANSFERASE (SGPT) (U/L) IN SER/PLAS: 36 U/L (ref 10–52)
ALBUMIN (G/DL) IN SER/PLAS: 4.4 G/DL (ref 3.4–5)
ALKALINE PHOSPHATASE (U/L) IN SER/PLAS: 103 U/L (ref 33–120)
ANION GAP IN SER/PLAS: 14 MMOL/L (ref 10–20)
ASPARTATE AMINOTRANSFERASE (SGOT) (U/L) IN SER/PLAS: 23 U/L (ref 9–39)
BILIRUBIN TOTAL (MG/DL) IN SER/PLAS: 0.6 MG/DL (ref 0–1.2)
CALCIUM (MG/DL) IN SER/PLAS: 9.4 MG/DL (ref 8.6–10.3)
CARBON DIOXIDE, TOTAL (MMOL/L) IN SER/PLAS: 27 MMOL/L (ref 21–32)
CHLORIDE (MMOL/L) IN SER/PLAS: 100 MMOL/L (ref 98–107)
CHOLESTEROL (MG/DL) IN SER/PLAS: 146 MG/DL (ref 0–199)
CHOLESTEROL IN HDL (MG/DL) IN SER/PLAS: 27.6 MG/DL
CHOLESTEROL IN LDL (MG/DL) IN SER/PLAS BY DIRECT ASSAY: 63 MG/DL (ref 0–129)
CHOLESTEROL/HDL RATIO: 5.3
CREATININE (MG/DL) IN SER/PLAS: 1 MG/DL (ref 0.5–1.3)
GFR MALE: 88 ML/MIN/1.73M2
GLUCOSE (MG/DL) IN SER/PLAS: 120 MG/DL (ref 74–99)
LDL: ABNORMAL MG/DL (ref 0–99)
NON HDL CHOLESTEROL: 118 MG/DL
POC HEMOGLOBIN A1C: 7.4 % (ref 4.2–6.5)
POTASSIUM (MMOL/L) IN SER/PLAS: 4.5 MMOL/L (ref 3.5–5.3)
PROTEIN TOTAL: 7.6 G/DL (ref 6.4–8.2)
SODIUM (MMOL/L) IN SER/PLAS: 136 MMOL/L (ref 136–145)
TRIGLYCERIDE (MG/DL) IN SER/PLAS: 411 MG/DL (ref 0–149)
UREA NITROGEN (MG/DL) IN SER/PLAS: 19 MG/DL (ref 6–23)
VLDL: ABNORMAL MG/DL (ref 0–40)

## 2023-03-21 PROCEDURE — 99214 OFFICE O/P EST MOD 30 MIN: CPT | Performed by: FAMILY MEDICINE

## 2023-03-21 PROCEDURE — 3078F DIAST BP <80 MM HG: CPT | Performed by: FAMILY MEDICINE

## 2023-03-21 PROCEDURE — 4010F ACE/ARB THERAPY RXD/TAKEN: CPT | Performed by: FAMILY MEDICINE

## 2023-03-21 PROCEDURE — 1036F TOBACCO NON-USER: CPT | Performed by: FAMILY MEDICINE

## 2023-03-21 PROCEDURE — 3074F SYST BP LT 130 MM HG: CPT | Performed by: FAMILY MEDICINE

## 2023-03-21 PROCEDURE — 83036 HEMOGLOBIN GLYCOSYLATED A1C: CPT | Performed by: FAMILY MEDICINE

## 2023-03-21 PROCEDURE — 36415 COLL VENOUS BLD VENIPUNCTURE: CPT

## 2023-03-21 PROCEDURE — 80053 COMPREHEN METABOLIC PANEL: CPT

## 2023-03-21 PROCEDURE — 84403 ASSAY OF TOTAL TESTOSTERONE: CPT

## 2023-03-21 PROCEDURE — 3008F BODY MASS INDEX DOCD: CPT | Performed by: FAMILY MEDICINE

## 2023-03-21 PROCEDURE — 80061 LIPID PANEL: CPT

## 2023-03-21 PROCEDURE — 84402 ASSAY OF FREE TESTOSTERONE: CPT

## 2023-03-21 PROCEDURE — 83721 ASSAY OF BLOOD LIPOPROTEIN: CPT

## 2023-03-21 ASSESSMENT — ENCOUNTER SYMPTOMS
DIZZINESS: 0
VOMITING: 0
CONSTIPATION: 0
EYE DISCHARGE: 0
ACTIVITY CHANGE: 0
BRUISES/BLEEDS EASILY: 0
NERVOUS/ANXIOUS: 0
SHORTNESS OF BREATH: 0
HEADACHES: 0
DYSPHORIC MOOD: 0
NECK PAIN: 0
CHEST TIGHTNESS: 0
BLOOD IN STOOL: 0
COUGH: 0
HEMATURIA: 0
WEAKNESS: 0
FATIGUE: 0
NUMBNESS: 0
NAUSEA: 0
SORE THROAT: 0
ADENOPATHY: 0
ABDOMINAL PAIN: 0
DIARRHEA: 0
MYALGIAS: 0
ARTHRALGIAS: 0
DIFFICULTY URINATING: 0
BACK PAIN: 0

## 2023-03-21 NOTE — PROGRESS NOTES
Subjective   Patient ID: Sebastian Kenney is a 57 y.o. male who presents for 6 month  follow up on Diabetes and Hypertension.    Discuss possible sleep apnea, snoring at night.     Would like referral Podiatry.     Needs colonoscopy referral.    HPI  Patient here for follow-up visit    Multiple issues noted      Diabetes stable  A1c 7.4 today  Blood work noted and improved  Continue good health maintenance    Colon cancer screening needed we will order colonoscopy    High cholesterol stable  Watch diet and take medicine    Obese  Recommend weight loss    Gout stable  Continue medicine watch diet    Hypertension stable  No chest pain or shortness of breath  Tolerates medicine    Coronary disease stable  No angina  Keep follow-up with cardiology    Obstructive sleep apnea symptoms of restless sleep and snoring at night and difficulty with wakefulness and insomnia suggest sleep apnea study    No testosterone stable has received replacement in the past  Review of Systems   Constitutional:  Negative for activity change and fatigue.   HENT:  Negative for congestion and sore throat.    Eyes:  Negative for discharge.   Respiratory:  Negative for cough, chest tightness and shortness of breath.    Cardiovascular:  Negative for chest pain and leg swelling.   Gastrointestinal:  Negative for abdominal pain, blood in stool, constipation, diarrhea, nausea and vomiting.   Endocrine: Negative for cold intolerance and heat intolerance.   Genitourinary:  Negative for difficulty urinating and hematuria.   Musculoskeletal:  Negative for arthralgias, back pain, gait problem, myalgias and neck pain.   Allergic/Immunologic: Negative for environmental allergies.   Neurological:  Negative for dizziness, syncope, weakness, numbness and headaches.   Hematological:  Negative for adenopathy. Does not bruise/bleed easily.   Psychiatric/Behavioral:  Negative for dysphoric mood. The patient is not nervous/anxious.    All other systems reviewed and  "are negative.      Objective   /70 (BP Location: Left arm, BP Cuff Size: Large adult)   Pulse 85   Temp 36.6 °C (97.8 °F)   Ht 1.88 m (6' 2\")   Wt 122 kg (269 lb 9.6 oz)   SpO2 96%   BMI 34.61 kg/m²    Physical Exam  Vitals and nursing note reviewed.   Constitutional:       General: He is not in acute distress.     Appearance: Normal appearance.   HENT:      Head: Normocephalic and atraumatic.      Right Ear: Tympanic membrane, ear canal and external ear normal.      Left Ear: Tympanic membrane, ear canal and external ear normal.      Nose: Nose normal.      Mouth/Throat:      Mouth: Mucous membranes are moist.      Pharynx: Oropharynx is clear. No oropharyngeal exudate or posterior oropharyngeal erythema.   Eyes:      Extraocular Movements: Extraocular movements intact.      Conjunctiva/sclera: Conjunctivae normal.      Pupils: Pupils are equal, round, and reactive to light.   Cardiovascular:      Rate and Rhythm: Normal rate and regular rhythm.      Pulses: Normal pulses.      Heart sounds: Normal heart sounds. No murmur heard.  Pulmonary:      Effort: Pulmonary effort is normal. No respiratory distress.      Breath sounds: Normal breath sounds. No wheezing or rales.   Abdominal:      General: Abdomen is flat. Bowel sounds are normal. There is no distension.      Palpations: Abdomen is soft. There is no mass.      Tenderness: There is no abdominal tenderness.   Musculoskeletal:         General: No swelling or deformity. Normal range of motion.      Cervical back: Normal range of motion and neck supple.      Right lower leg: No edema.      Left lower leg: No edema.   Lymphadenopathy:      Cervical: No cervical adenopathy.   Skin:     General: Skin is warm and dry.      Capillary Refill: Capillary refill takes less than 2 seconds.      Findings: No lesion or rash.   Neurological:      General: No focal deficit present.      Mental Status: He is alert and oriented to person, place, and time.      Cranial " Nerves: No cranial nerve deficit.      Motor: No weakness.   Psychiatric:         Mood and Affect: Mood normal.         Behavior: Behavior normal.         Thought Content: Thought content normal.         Judgment: Judgment normal.     Hga1c 7.4    Assessment/Plan   Problem List Items Addressed This Visit          Nervous    LENNY (obstructive sleep apnea)    Relevant Orders    Home sleep apnea test (HSAT)       Circulatory    Coronary artery disease involving native coronary artery of native heart without angina pectoris    Essential hypertension       Musculoskeletal    Idiopathic chronic gout of multiple sites without tophus       Endocrine/Metabolic    Type 2 diabetes mellitus without complication, without long-term current use of insulin (CMS/[facility])    Class 1 obesity with body mass index (BMI) of 33.0 to 33.9 in adult       Other    Mixed hyperlipidemia    Relevant Orders    Lipid Panel (Completed)    Colon cancer screening - Primary    Relevant Orders    Colonoscopy    Colonoscopy    Low testosterone in male    Relevant Orders    Testosterone, total and free       Patient education provided.  Stay current with age appropriate health maintenance as instructed.  Appointment here or ER with new or worsening symptoms'  Keep appropriate follow-up visit.  Stay current with proper immunizations   Testing as above  Recommend colonoscopy recommend blood work stay current  6-month recheck and as needed  Keep follow-up with specialist as well  Lifestyle modification  Orders as noted above

## 2023-03-25 LAB
TESTOSTERONE FREE (CHAN): 32.6 PG/ML (ref 35–155)
TESTOSTERONE,TOTAL,LC-MS/MS: 167 NG/DL (ref 250–1100)

## 2023-03-28 DIAGNOSIS — M1A.09X0 IDIOPATHIC CHRONIC GOUT OF MULTIPLE SITES WITHOUT TOPHUS: Primary | ICD-10-CM

## 2023-03-28 RX ORDER — ALLOPURINOL 300 MG/1
TABLET ORAL
Qty: 90 TABLET | Refills: 2 | Status: SHIPPED | OUTPATIENT
Start: 2023-03-28

## 2023-03-28 RX ORDER — ATORVASTATIN CALCIUM 40 MG/1
TABLET, FILM COATED ORAL
Qty: 90 TABLET | Refills: 1 | Status: SHIPPED | OUTPATIENT
Start: 2023-03-28

## 2023-04-04 NOTE — RESULT ENCOUNTER NOTE
Sent patient a letter with results attached due to not being able to reach the patient by phone for 8 days.

## 2023-04-21 ENCOUNTER — PATIENT OUTREACH (OUTPATIENT)
Dept: CARE COORDINATION | Facility: CLINIC | Age: 58
End: 2023-04-21
Payer: COMMERCIAL

## 2023-04-21 NOTE — PROGRESS NOTES
Discharge Facility: [facility]  Discharge Diagnosis: septic shock, R foot cellulitis  Admission Date: 4/15/23  Discharge Date: 4/20/23    PCP Appointment Date:No PCP appointments available within 14 days of discharge. Message sent to practice clerical pool to reach out to patient and schedule an appointment within 7-13 days from discharge date.  Specialist Appointment Date: podiatry next week, still needs ID scheduled  Hospital Encounter and Summary: Linked  See discharge assessment below for further details    Engagement  Call Start Time: 1429 (4/21/2023  2:31 PM)    Medications  Medications reviewed with patient/caregiver?: Yes (4/21/2023  2:31 PM)  Is the patient having any side effects they believe may be caused by any medication additions or changes?: No (4/21/2023  2:31 PM)  Does the patient have all medications ordered at discharge?: Yes (4/21/2023  2:31 PM)  Is the patient taking all medications as directed (includes completed medication regime)?: Yes (4/21/2023  2:31 PM)  Medication Comments: new/changed medications reviewed only (4/21/2023  2:31 PM)    Appointments  Does the patient have a primary care provider?: Yes (4/21/2023  2:31 PM)  Care Management Interventions: Educated patient on importance of making appointment; Advised patient to make appointment (4/21/2023  2:31 PM)  Care Management Interventions: Advised patient to keep appointment; Educated on importance of keeping appointment; Advised to schedule with specialist (4/21/2023  2:31 PM)  Follow Up Tasks: PCP needed; Appointments (4/21/2023  2:31 PM)    Self Management  What is the home health agency?: n/a (4/21/2023  2:31 PM)  What Durable Medical Equipment (DME) was ordered?: wound care supplies and surgical shoe sent with patient at d/c (4/21/2023  2:31 PM)    Patient Teaching  Does the patient have access to their discharge instructions?: Yes (4/21/2023  2:31 PM)  Care Management Interventions: Reviewed instructions with patient (4/21/2023   2:31 PM)  What is the patient's perception of their health status since discharge?: Improving (4/21/2023  2:31 PM)  Is the patient/caregiver able to teach back the hierarchy of who to call/visit for symptoms/problems? PCP, Specialist, Home Health nurse, Urgent Care, ED, 911: Yes (4/21/2023  2:31 PM)

## 2023-05-04 ENCOUNTER — PATIENT OUTREACH (OUTPATIENT)
Dept: CARE COORDINATION | Facility: CLINIC | Age: 58
End: 2023-05-04

## 2023-05-04 ENCOUNTER — OFFICE VISIT (OUTPATIENT)
Dept: INFECTIOUS DISEASES | Age: 58
End: 2023-05-04

## 2023-05-04 VITALS
HEART RATE: 91 BPM | BODY MASS INDEX: 34.15 KG/M2 | WEIGHT: 266 LBS | TEMPERATURE: 98.1 F | DIASTOLIC BLOOD PRESSURE: 80 MMHG | SYSTOLIC BLOOD PRESSURE: 133 MMHG

## 2023-05-04 DIAGNOSIS — E11.628 DIABETIC FOOT INFECTION (HCC): Primary | ICD-10-CM

## 2023-05-04 DIAGNOSIS — A41.9 SEPSIS, DUE TO UNSPECIFIED ORGANISM, UNSPECIFIED WHETHER ACUTE ORGAN DYSFUNCTION PRESENT (HCC): ICD-10-CM

## 2023-05-04 DIAGNOSIS — L08.9 DIABETIC FOOT INFECTION (HCC): Primary | ICD-10-CM

## 2023-05-04 DIAGNOSIS — L89.899 PRESSURE INJURY OF SKIN OF DORSUM OF RIGHT FOOT, UNSPECIFIED INJURY STAGE: ICD-10-CM

## 2023-05-04 RX ORDER — ASPIRIN 81 MG/1
81 TABLET ORAL DAILY
COMMUNITY

## 2023-05-04 ASSESSMENT — PATIENT HEALTH QUESTIONNAIRE - PHQ9
SUM OF ALL RESPONSES TO PHQ QUESTIONS 1-9: 0
1. LITTLE INTEREST OR PLEASURE IN DOING THINGS: 0
SUM OF ALL RESPONSES TO PHQ9 QUESTIONS 1 & 2: 0
SUM OF ALL RESPONSES TO PHQ QUESTIONS 1-9: 0
SUM OF ALL RESPONSES TO PHQ QUESTIONS 1-9: 0
2. FEELING DOWN, DEPRESSED OR HOPELESS: 0
SUM OF ALL RESPONSES TO PHQ QUESTIONS 1-9: 0

## 2023-05-04 NOTE — PROGRESS NOTES
Follow up call completed after hospitalization. Patient did not see PCP within 14 days of discharge. Patient is scheduled for 5/12/23. Patient followed up with infectious disease today and has completed antibiotic treatment. Patient is currently seeing podiatry weekly.  At time of outreach call the patient feels as if their condition has improved since last visit.  Reviewed the upcoming PCP appointment with the pt and addressed any questions or concerns. Reviewed reportable s/s of infection, patient verbalizes understanding.

## 2023-05-12 ENCOUNTER — OFFICE VISIT (OUTPATIENT)
Dept: PRIMARY CARE | Facility: CLINIC | Age: 58
End: 2023-05-12
Payer: COMMERCIAL

## 2023-05-12 VITALS
HEIGHT: 74 IN | TEMPERATURE: 97.6 F | HEART RATE: 73 BPM | WEIGHT: 264.8 LBS | DIASTOLIC BLOOD PRESSURE: 82 MMHG | SYSTOLIC BLOOD PRESSURE: 130 MMHG | OXYGEN SATURATION: 98 % | BODY MASS INDEX: 33.98 KG/M2

## 2023-05-12 DIAGNOSIS — M1A.09X0 IDIOPATHIC CHRONIC GOUT OF MULTIPLE SITES WITHOUT TOPHUS: ICD-10-CM

## 2023-05-12 DIAGNOSIS — E78.2 MIXED HYPERLIPIDEMIA: ICD-10-CM

## 2023-05-12 DIAGNOSIS — L97.513 ULCER OF RIGHT FOOT WITH NECROSIS OF MUSCLE (MULTI): ICD-10-CM

## 2023-05-12 DIAGNOSIS — I10 ESSENTIAL HYPERTENSION: ICD-10-CM

## 2023-05-12 DIAGNOSIS — G47.33 OSA (OBSTRUCTIVE SLEEP APNEA): ICD-10-CM

## 2023-05-12 DIAGNOSIS — E11.9 TYPE 2 DIABETES MELLITUS WITHOUT COMPLICATION, WITHOUT LONG-TERM CURRENT USE OF INSULIN (MULTI): ICD-10-CM

## 2023-05-12 DIAGNOSIS — E66.09 CLASS 1 OBESITY DUE TO EXCESS CALORIES WITH SERIOUS COMORBIDITY AND BODY MASS INDEX (BMI) OF 34.0 TO 34.9 IN ADULT: ICD-10-CM

## 2023-05-12 DIAGNOSIS — I25.10 CORONARY ARTERY DISEASE INVOLVING NATIVE CORONARY ARTERY OF NATIVE HEART WITHOUT ANGINA PECTORIS: Primary | ICD-10-CM

## 2023-05-12 PROBLEM — D69.6 LOW PLATELET COUNT (CMS-HCC): Status: ACTIVE | Noted: 2023-05-12

## 2023-05-12 PROBLEM — L03.115 CELLULITIS OF RIGHT LOWER EXTREMITY: Status: ACTIVE | Noted: 2023-05-12

## 2023-05-12 PROBLEM — A41.9 SEPSIS (MULTI): Status: ACTIVE | Noted: 2023-05-12

## 2023-05-12 PROBLEM — M14.679 CHARCOT'S JOINT OF FOOT: Status: ACTIVE | Noted: 2023-05-12

## 2023-05-12 PROBLEM — E87.1 HYPONATREMIA: Status: ACTIVE | Noted: 2023-05-12

## 2023-05-12 PROBLEM — R57.9 SHOCK (MULTI): Status: RESOLVED | Noted: 2023-05-12 | Resolved: 2023-05-12

## 2023-05-12 PROBLEM — D64.9 ANEMIA: Status: ACTIVE | Noted: 2023-05-12

## 2023-05-12 PROBLEM — N17.9 ACUTE RENAL FAILURE (CMS-HCC): Status: ACTIVE | Noted: 2023-05-12

## 2023-05-12 PROBLEM — R57.9 SHOCK (MULTI): Status: ACTIVE | Noted: 2023-05-12

## 2023-05-12 PROBLEM — L97.509 FOOT ULCER (MULTI): Status: ACTIVE | Noted: 2023-05-12

## 2023-05-12 PROBLEM — E83.42 HYPOMAGNESEMIA: Status: ACTIVE | Noted: 2023-05-12

## 2023-05-12 PROBLEM — A41.9 SEPSIS (MULTI): Status: RESOLVED | Noted: 2023-05-12 | Resolved: 2023-05-12

## 2023-05-12 PROBLEM — L03.90 CELLULITIS: Status: ACTIVE | Noted: 2023-05-12

## 2023-05-12 PROCEDURE — 3008F BODY MASS INDEX DOCD: CPT | Performed by: FAMILY MEDICINE

## 2023-05-12 PROCEDURE — 3075F SYST BP GE 130 - 139MM HG: CPT | Performed by: FAMILY MEDICINE

## 2023-05-12 PROCEDURE — 3051F HG A1C>EQUAL 7.0%<8.0%: CPT | Performed by: FAMILY MEDICINE

## 2023-05-12 PROCEDURE — 99214 OFFICE O/P EST MOD 30 MIN: CPT | Performed by: FAMILY MEDICINE

## 2023-05-12 PROCEDURE — 1036F TOBACCO NON-USER: CPT | Performed by: FAMILY MEDICINE

## 2023-05-12 PROCEDURE — 4010F ACE/ARB THERAPY RXD/TAKEN: CPT | Performed by: FAMILY MEDICINE

## 2023-05-12 PROCEDURE — 3079F DIAST BP 80-89 MM HG: CPT | Performed by: FAMILY MEDICINE

## 2023-05-12 ASSESSMENT — ENCOUNTER SYMPTOMS
FATIGUE: 0
ACTIVITY CHANGE: 0
NERVOUS/ANXIOUS: 0
DYSPHORIC MOOD: 0
DIFFICULTY URINATING: 0
NAUSEA: 0
HEADACHES: 0
DIARRHEA: 0
MYALGIAS: 0
CHEST TIGHTNESS: 0
DIZZINESS: 0
ARTHRALGIAS: 0
NUMBNESS: 0
EYE DISCHARGE: 0
BLOOD IN STOOL: 0
CONSTIPATION: 0
SHORTNESS OF BREATH: 0
NECK PAIN: 0
ABDOMINAL PAIN: 0
WEAKNESS: 0
VOMITING: 0
COUGH: 0
BACK PAIN: 0
ADENOPATHY: 0
BRUISES/BLEEDS EASILY: 0
HEMATURIA: 0
SORE THROAT: 0

## 2023-05-12 NOTE — PROGRESS NOTES
"Subjective   Patient ID: Sebastian Kenney is a 58 y.o. male who presents for Hospital Follow-up from /[place] for septic shock and right foot cellulitis.     Patient has been off Lisinopril since being out of the hospital.     HPI  Diabetic foot ulcer  Seen by specialist  Doing better but did have cellulitis and sepsis and shock and required ICU stay    Coronary disease stable no chest pain    Hypertension stable tolerates medicine    Gout controlled no new or worsening symptoms    Diabetes in good control good blood work noted    Obese  Recommend weight loss    High cholesterol stable  Follow diet    Sleep apnea stable  Continue CPAP    Had foot fracture this is healing keep follow-up with foot specialist  Review of Systems   Constitutional:  Negative for activity change and fatigue.   HENT:  Negative for congestion and sore throat.    Eyes:  Negative for discharge.   Respiratory:  Negative for cough, chest tightness and shortness of breath.    Cardiovascular:  Negative for chest pain and leg swelling.   Gastrointestinal:  Negative for abdominal pain, blood in stool, constipation, diarrhea, nausea and vomiting.   Endocrine: Negative for cold intolerance and heat intolerance.   Genitourinary:  Negative for difficulty urinating and hematuria.   Musculoskeletal:  Negative for arthralgias, back pain, gait problem, myalgias and neck pain.   Allergic/Immunologic: Negative for environmental allergies.   Neurological:  Negative for dizziness, syncope, weakness, numbness and headaches.   Hematological:  Negative for adenopathy. Does not bruise/bleed easily.   Psychiatric/Behavioral:  Negative for dysphoric mood. The patient is not nervous/anxious.    All other systems reviewed and are negative.      Objective   /82 (BP Location: Right arm, BP Cuff Size: Large adult)   Pulse 73   Temp 36.4 °C (97.6 °F)   Ht 1.88 m (6' 2\")   Wt 120 kg (264 lb 12.8 oz)   SpO2 98%   BMI 34.00 kg/m²    Physical Exam  Vitals and " nursing note reviewed.   Constitutional:       General: He is not in acute distress.     Appearance: Normal appearance.   HENT:      Head: Normocephalic and atraumatic.      Right Ear: Tympanic membrane, ear canal and external ear normal.      Left Ear: Tympanic membrane, ear canal and external ear normal.      Nose: Nose normal.      Mouth/Throat:      Mouth: Mucous membranes are moist.      Pharynx: Oropharynx is clear. No oropharyngeal exudate or posterior oropharyngeal erythema.   Eyes:      Extraocular Movements: Extraocular movements intact.      Conjunctiva/sclera: Conjunctivae normal.      Pupils: Pupils are equal, round, and reactive to light.   Cardiovascular:      Rate and Rhythm: Normal rate and regular rhythm.      Pulses: Normal pulses.      Heart sounds: Normal heart sounds. No murmur heard.  Pulmonary:      Effort: Pulmonary effort is normal. No respiratory distress.      Breath sounds: Normal breath sounds. No wheezing or rales.   Abdominal:      General: Abdomen is flat. Bowel sounds are normal. There is no distension.      Palpations: Abdomen is soft. There is no mass.      Tenderness: There is no abdominal tenderness.   Musculoskeletal:         General: No swelling or deformity. Normal range of motion.      Cervical back: Normal range of motion and neck supple.      Right lower leg: No edema.      Left lower leg: No edema.   Lymphadenopathy:      Cervical: No cervical adenopathy.   Skin:     General: Skin is warm and dry.      Capillary Refill: Capillary refill takes less than 2 seconds.      Findings: No lesion or rash.   Neurological:      General: No focal deficit present.      Mental Status: He is alert and oriented to person, place, and time.      Cranial Nerves: No cranial nerve deficit.      Motor: No weakness.   Psychiatric:         Mood and Affect: Mood normal.         Behavior: Behavior normal.         Thought Content: Thought content normal.         Judgment: Judgment normal.          Assessment/Plan   Problem List Items Addressed This Visit          Nervous    LENNY (obstructive sleep apnea)       Circulatory    Coronary artery disease involving native coronary artery of native heart without angina pectoris - Primary    Essential hypertension       Musculoskeletal    Idiopathic chronic gout of multiple sites without tophus    Ulcer of right foot with necrosis of muscle (CMS/HCC)       Endocrine/Metabolic    Type 2 diabetes mellitus without complication, without long-term current use of insulin (CMS/HCC)    Class 1 obesity due to excess calories with serious comorbidity and body mass index (BMI) of 34.0 to 34.9 in adult       Other    Mixed hyperlipidemia       Patient education provided.  Stay current with age appropriate health maintenance as instructed.  Appointment here or ER with new or worsening symptoms'  Keep appropriate follow-up visit.  Stay current with proper immunizations   Recheck 3 months  Keep follow-up with specialist  Stressed importance of diabetic and general health maintenance

## 2023-05-24 ENCOUNTER — OFFICE VISIT (OUTPATIENT)
Dept: ENDOCRINOLOGY | Age: 58
End: 2023-05-24
Payer: COMMERCIAL

## 2023-05-24 VITALS
BODY MASS INDEX: 34.14 KG/M2 | HEART RATE: 77 BPM | WEIGHT: 266 LBS | HEIGHT: 74 IN | OXYGEN SATURATION: 96 % | SYSTOLIC BLOOD PRESSURE: 133 MMHG | DIASTOLIC BLOOD PRESSURE: 79 MMHG

## 2023-05-24 DIAGNOSIS — Z96.41 INSULIN PUMP IN PLACE: ICD-10-CM

## 2023-05-24 DIAGNOSIS — E11.42 DIABETIC POLYNEUROPATHY ASSOCIATED WITH TYPE 2 DIABETES MELLITUS (HCC): Primary | ICD-10-CM

## 2023-05-24 DIAGNOSIS — E29.1 HYPOGONADISM IN MALE: ICD-10-CM

## 2023-05-24 LAB
CHP ED QC CHECK: NORMAL
GLUCOSE BLD-MCNC: 151 MG/DL

## 2023-05-24 PROCEDURE — 3017F COLORECTAL CA SCREEN DOC REV: CPT | Performed by: INTERNAL MEDICINE

## 2023-05-24 PROCEDURE — 3075F SYST BP GE 130 - 139MM HG: CPT | Performed by: INTERNAL MEDICINE

## 2023-05-24 PROCEDURE — 3051F HG A1C>EQUAL 7.0%<8.0%: CPT | Performed by: INTERNAL MEDICINE

## 2023-05-24 PROCEDURE — G8427 DOCREV CUR MEDS BY ELIG CLIN: HCPCS | Performed by: INTERNAL MEDICINE

## 2023-05-24 PROCEDURE — 1036F TOBACCO NON-USER: CPT | Performed by: INTERNAL MEDICINE

## 2023-05-24 PROCEDURE — G8417 CALC BMI ABV UP PARAM F/U: HCPCS | Performed by: INTERNAL MEDICINE

## 2023-05-24 PROCEDURE — 2022F DILAT RTA XM EVC RTNOPTHY: CPT | Performed by: INTERNAL MEDICINE

## 2023-05-24 PROCEDURE — 95251 CONT GLUC MNTR ANALYSIS I&R: CPT | Performed by: INTERNAL MEDICINE

## 2023-05-24 PROCEDURE — 3078F DIAST BP <80 MM HG: CPT | Performed by: INTERNAL MEDICINE

## 2023-05-24 PROCEDURE — 99213 OFFICE O/P EST LOW 20 MIN: CPT | Performed by: INTERNAL MEDICINE

## 2023-05-24 RX ORDER — SILDENAFIL 100 MG/1
100 TABLET, FILM COATED ORAL PRN
Qty: 30 TABLET | Refills: 3 | Status: SHIPPED | OUTPATIENT
Start: 2023-05-24

## 2023-05-24 RX ORDER — TESTOSTERONE GEL, 1% 10 MG/G
2 GEL TRANSDERMAL DAILY
Qty: 180 EACH | Refills: 3 | Status: SHIPPED | OUTPATIENT
Start: 2023-05-24 | End: 2023-08-22

## 2023-05-24 ASSESSMENT — ENCOUNTER SYMPTOMS: EYES NEGATIVE: 1

## 2023-05-24 NOTE — PROGRESS NOTES
5/24/2023    Assessment:       Diagnosis Orders   1. Diabetic polyneuropathy associated with type 2 diabetes mellitus (HCC)  POCT Glucose    Basic Metabolic Panel    Microalbumin / Creatinine Urine Ratio    Hemoglobin A1C      2. Hypogonadism in male  Testosterone, free, total    testosterone (ANDROGEL; TESTIM) 50 MG/5GM (1%) GEL 1% gel      3. Insulin pump in place              PLAN:     Orders Placed This Encounter   Procedures    Basic Metabolic Panel     Standing Status:   Future     Standing Expiration Date:   5/24/2024    Microalbumin / Creatinine Urine Ratio     Standing Status:   Future     Standing Expiration Date:   5/24/2024    Hemoglobin A1C     Standing Status:   Future     Standing Expiration Date:   5/24/2024    Testosterone, free, total     Standing Status:   Future     Standing Expiration Date:   5/24/2024    POCT Glucose     Orders Placed This Encounter   Medications    testosterone (ANDROGEL; TESTIM) 50 MG/5GM (1%) GEL 1% gel     Sig: Apply 2 Tubes topically daily for 90 days. Dispense:  180 each     Refill:  3     Continue patient on insulin pump as per current pump setting  Patient regarding [employer] 780 pump provided  A1c goal of 7 or lower      Orders Placed This Encounter   Procedures    POCT Glucose     No orders of the defined types were placed in this encounter. No follow-ups on file.   Subjective:     Chief Complaint   Patient presents with    Diabetes    Hypogonadism     Vitals:    05/24/23 0932   BP: 133/79   Site: Left Upper Arm   Position: Sitting   Cuff Size: Large Adult   Pulse: 77   SpO2: 96%   Weight: 266 lb (120.7 kg)   Height: 6' 2\" (1.88 m)     Wt Readings from Last 3 Encounters:   05/24/23 266 lb (120.7 kg)   05/04/23 266 lb (120.7 kg)   01/17/23 268 lb (121.6 kg)     BP Readings from Last 3 Encounters:   05/24/23 133/79   05/04/23 133/80   01/17/23 109/63     Follow-up on type 2 diabetes patient is on insulin pump last hemoglobin A1c was 7.6 pump was downloaded and

## 2023-05-25 ENCOUNTER — PATIENT MESSAGE (OUTPATIENT)
Dept: PRIMARY CARE | Facility: CLINIC | Age: 58
End: 2023-05-25
Payer: COMMERCIAL

## 2023-05-25 DIAGNOSIS — G47.33 OSA (OBSTRUCTIVE SLEEP APNEA): ICD-10-CM

## 2023-05-31 ENCOUNTER — PATIENT MESSAGE (OUTPATIENT)
Dept: ENDOCRINOLOGY | Age: 58
End: 2023-05-31

## 2023-06-08 ENCOUNTER — PATIENT OUTREACH (OUTPATIENT)
Dept: CARE COORDINATION | Facility: CLINIC | Age: 58
End: 2023-06-08
Payer: COMMERCIAL

## 2023-06-08 NOTE — PROGRESS NOTES
Unable to reach patient for one month post discharge follow up call.   LVM with call back number for patient to call if needed

## 2023-07-10 DIAGNOSIS — R19.7 DIARRHEA, UNSPECIFIED TYPE: ICD-10-CM

## 2023-07-11 ENCOUNTER — PATIENT OUTREACH (OUTPATIENT)
Dept: CARE COORDINATION | Facility: CLINIC | Age: 58
End: 2023-07-11
Payer: COMMERCIAL

## 2023-07-11 NOTE — PROGRESS NOTES
Unable to reach patient for 90 day post discharge follow up call.   LVM with call back number for patient to call if needed.

## 2023-07-25 ENCOUNTER — HOSPITAL ENCOUNTER (OUTPATIENT)
Dept: DATA CONVERSION | Facility: HOSPITAL | Age: 58
End: 2023-07-25
Attending: INTERNAL MEDICINE | Admitting: INTERNAL MEDICINE
Payer: COMMERCIAL

## 2023-07-25 DIAGNOSIS — Z12.11 ENCOUNTER FOR SCREENING FOR MALIGNANT NEOPLASM OF COLON: ICD-10-CM

## 2023-07-25 DIAGNOSIS — K63.89 OTHER SPECIFIED DISEASES OF INTESTINE: ICD-10-CM

## 2023-07-25 DIAGNOSIS — Z86.010 PERSONAL HISTORY OF COLONIC POLYPS: ICD-10-CM

## 2023-07-25 LAB — POCT GLUCOSE: 161 MG/DL (ref 74–99)

## 2023-07-28 DIAGNOSIS — R19.7 DIARRHEA, UNSPECIFIED TYPE: ICD-10-CM

## 2023-07-28 DIAGNOSIS — K52.9 COLITIS: Primary | ICD-10-CM

## 2023-07-28 RX ORDER — METRONIDAZOLE 500 MG/1
500 TABLET ORAL 3 TIMES DAILY
Qty: 42 TABLET | Refills: 0 | Status: SHIPPED | OUTPATIENT
Start: 2023-07-28 | End: 2023-08-11

## 2023-07-31 LAB
C. DIFFICILE TOXIN, PCR: DETECTED
C. DIFFICILE TOXIN, PCR: NORMAL
CLOSTRIDIUM DIFFICILE NAP 1 STRAIN (PRESUMPTIVE): NORMAL

## 2023-08-01 LAB
COMPLETE PATHOLOGY REPORT: NORMAL
CONVERTED CLINICAL DIAGNOSIS-HISTORY: NORMAL
CONVERTED FINAL DIAGNOSIS: NORMAL
CONVERTED FINAL REPORT PDF LINK TO COPY AND PASTE: NORMAL
CONVERTED GROSS DESCRIPTION: NORMAL

## 2023-08-22 ENCOUNTER — APPOINTMENT (OUTPATIENT)
Dept: PRIMARY CARE | Facility: CLINIC | Age: 58
End: 2023-08-22
Payer: COMMERCIAL

## 2023-08-23 ENCOUNTER — OFFICE VISIT (OUTPATIENT)
Dept: ENDOCRINOLOGY | Age: 58
End: 2023-08-23

## 2023-08-23 VITALS
DIASTOLIC BLOOD PRESSURE: 82 MMHG | TEMPERATURE: 97 F | BODY MASS INDEX: 33.88 KG/M2 | HEART RATE: 76 BPM | OXYGEN SATURATION: 97 % | HEIGHT: 74 IN | WEIGHT: 264 LBS | SYSTOLIC BLOOD PRESSURE: 125 MMHG

## 2023-08-23 DIAGNOSIS — E11.42 DIABETIC POLYNEUROPATHY ASSOCIATED WITH TYPE 2 DIABETES MELLITUS (HCC): Primary | ICD-10-CM

## 2023-08-23 DIAGNOSIS — Z96.41 INSULIN PUMP IN PLACE: ICD-10-CM

## 2023-08-23 LAB
CHP ED QC CHECK: NORMAL
GLUCOSE BLD-MCNC: 178 MG/DL
HBA1C MFR BLD: 7.7 %

## 2023-08-23 RX ORDER — GENTAMICIN SULFATE 1 MG/G
OINTMENT TOPICAL
COMMUNITY
Start: 2023-05-22

## 2023-08-23 RX ORDER — VANCOMYCIN HYDROCHLORIDE 125 MG/1
125 CAPSULE ORAL 4 TIMES DAILY
COMMUNITY
Start: 2023-07-31

## 2023-08-23 RX ORDER — SODIUM, POTASSIUM,MAG SULFATES 17.5-3.13G
SOLUTION, RECONSTITUTED, ORAL ORAL
COMMUNITY
Start: 2023-07-17

## 2023-08-23 RX ORDER — LISINOPRIL 2.5 MG/1
2.5 TABLET ORAL DAILY
COMMUNITY
Start: 2023-08-11

## 2023-08-23 RX ORDER — METOPROLOL SUCCINATE 50 MG/1
50 TABLET, EXTENDED RELEASE ORAL DAILY
COMMUNITY
Start: 2023-08-10

## 2023-08-23 RX ORDER — METRONIDAZOLE 500 MG/1
TABLET ORAL
COMMUNITY
Start: 2023-07-28

## 2023-08-24 ENCOUNTER — OFFICE VISIT (OUTPATIENT)
Dept: PRIMARY CARE | Facility: CLINIC | Age: 58
End: 2023-08-24
Payer: COMMERCIAL

## 2023-08-24 VITALS
DIASTOLIC BLOOD PRESSURE: 88 MMHG | WEIGHT: 265.8 LBS | OXYGEN SATURATION: 98 % | BODY MASS INDEX: 34.11 KG/M2 | HEIGHT: 74 IN | SYSTOLIC BLOOD PRESSURE: 150 MMHG | TEMPERATURE: 93.7 F | HEART RATE: 74 BPM | RESPIRATION RATE: 16 BRPM

## 2023-08-24 DIAGNOSIS — M14.671 CHARCOT'S JOINT OF RIGHT FOOT: ICD-10-CM

## 2023-08-24 DIAGNOSIS — E66.09 CLASS 1 OBESITY DUE TO EXCESS CALORIES WITH SERIOUS COMORBIDITY AND BODY MASS INDEX (BMI) OF 34.0 TO 34.9 IN ADULT: ICD-10-CM

## 2023-08-24 DIAGNOSIS — E11.9 TYPE 2 DIABETES MELLITUS WITHOUT COMPLICATION, WITHOUT LONG-TERM CURRENT USE OF INSULIN (MULTI): ICD-10-CM

## 2023-08-24 DIAGNOSIS — I10 ESSENTIAL HYPERTENSION: ICD-10-CM

## 2023-08-24 DIAGNOSIS — E78.2 MIXED HYPERLIPIDEMIA: ICD-10-CM

## 2023-08-24 DIAGNOSIS — M1A.09X0 IDIOPATHIC CHRONIC GOUT OF MULTIPLE SITES WITHOUT TOPHUS: ICD-10-CM

## 2023-08-24 DIAGNOSIS — I25.10 CORONARY ARTERY DISEASE INVOLVING NATIVE CORONARY ARTERY OF NATIVE HEART WITHOUT ANGINA PECTORIS: Primary | ICD-10-CM

## 2023-08-24 PROCEDURE — 99214 OFFICE O/P EST MOD 30 MIN: CPT | Performed by: FAMILY MEDICINE

## 2023-08-24 PROCEDURE — 3077F SYST BP >= 140 MM HG: CPT | Performed by: FAMILY MEDICINE

## 2023-08-24 PROCEDURE — 4010F ACE/ARB THERAPY RXD/TAKEN: CPT | Performed by: FAMILY MEDICINE

## 2023-08-24 PROCEDURE — 1036F TOBACCO NON-USER: CPT | Performed by: FAMILY MEDICINE

## 2023-08-24 PROCEDURE — 3008F BODY MASS INDEX DOCD: CPT | Performed by: FAMILY MEDICINE

## 2023-08-24 PROCEDURE — 3051F HG A1C>EQUAL 7.0%<8.0%: CPT | Performed by: FAMILY MEDICINE

## 2023-08-24 PROCEDURE — 3079F DIAST BP 80-89 MM HG: CPT | Performed by: FAMILY MEDICINE

## 2023-08-24 RX ORDER — GABAPENTIN 600 MG/1
600 TABLET ORAL
Qty: 150 TABLET | Refills: 11 | Status: SHIPPED | OUTPATIENT
Start: 2023-08-24 | End: 2023-09-07

## 2023-08-24 ASSESSMENT — ENCOUNTER SYMPTOMS
ABDOMINAL PAIN: 0
CONSTIPATION: 0
NERVOUS/ANXIOUS: 0
HEMATURIA: 0
NECK PAIN: 0
EYE DISCHARGE: 0
CHEST TIGHTNESS: 0
FATIGUE: 0
NAUSEA: 0
BRUISES/BLEEDS EASILY: 0
ARTHRALGIAS: 0
WEAKNESS: 0
HEADACHES: 0
ADENOPATHY: 0
SHORTNESS OF BREATH: 0
DIARRHEA: 0
BACK PAIN: 0
DIFFICULTY URINATING: 0
MYALGIAS: 0
ACTIVITY CHANGE: 0
NUMBNESS: 0
DIZZINESS: 0
SORE THROAT: 0
BLOOD IN STOOL: 0
DYSPHORIC MOOD: 0
COUGH: 0
VOMITING: 0

## 2023-08-24 ASSESSMENT — PATIENT HEALTH QUESTIONNAIRE - PHQ9
SUM OF ALL RESPONSES TO PHQ9 QUESTIONS 1 & 2: 0
2. FEELING DOWN, DEPRESSED OR HOPELESS: NOT AT ALL
1. LITTLE INTEREST OR PLEASURE IN DOING THINGS: NOT AT ALL

## 2023-08-24 NOTE — PROGRESS NOTES
"Subjective   Patient ID: Tim Kenney is a 58 y.o. male who presents for Follow-up.  HPI  C dif resolved  Keep follow-up with GI and update colonoscopy as appropriate    Coronary disease stable no angina keep cardio follow-up    Hypertension elevated today  Was excellent at his endocrinologist yesterday  Previously has been relatively stable  Suggest medicine adjustment with progressive or persistent elevation of blood pressure no chest pain or shortness of breath    High cholesterol stable watch diet    Diabetes stable  No progression or worsening    Obese  Recommend weight loss    Gout stable  Watches diet    Charcot foot stable seen by his specialists and podiatry for this  Review of Systems   Constitutional:  Negative for activity change and fatigue.   HENT:  Negative for congestion and sore throat.    Eyes:  Negative for discharge.   Respiratory:  Negative for cough, chest tightness and shortness of breath.    Cardiovascular:  Negative for chest pain and leg swelling.   Gastrointestinal:  Negative for abdominal pain, blood in stool, constipation, diarrhea, nausea and vomiting.   Endocrine: Negative for cold intolerance and heat intolerance.   Genitourinary:  Negative for difficulty urinating and hematuria.   Musculoskeletal:  Negative for arthralgias, back pain, gait problem, myalgias and neck pain.   Allergic/Immunologic: Negative for environmental allergies.   Neurological:  Negative for dizziness, syncope, weakness, numbness and headaches.   Hematological:  Negative for adenopathy. Does not bruise/bleed easily.   Psychiatric/Behavioral:  Negative for dysphoric mood. The patient is not nervous/anxious.    All other systems reviewed and are negative.      Objective   /88   Pulse 74   Temp 34.3 °C (93.7 °F)   Resp 16   Ht 1.88 m (6' 2\")   Wt 121 kg (265 lb 12.8 oz)   SpO2 98%   BMI 34.13 kg/m²    Physical Exam  Vitals and nursing note reviewed.   Constitutional:       General: He is not in acute " distress.     Appearance: Normal appearance. He is obese.   HENT:      Head: Normocephalic and atraumatic.      Right Ear: Tympanic membrane, ear canal and external ear normal.      Left Ear: Tympanic membrane, ear canal and external ear normal.      Nose: Nose normal.      Mouth/Throat:      Mouth: Mucous membranes are moist.      Pharynx: Oropharynx is clear. No oropharyngeal exudate or posterior oropharyngeal erythema.   Eyes:      Extraocular Movements: Extraocular movements intact.      Conjunctiva/sclera: Conjunctivae normal.      Pupils: Pupils are equal, round, and reactive to light.   Cardiovascular:      Rate and Rhythm: Normal rate and regular rhythm.      Pulses: Normal pulses.      Heart sounds: Normal heart sounds. No murmur heard.  Pulmonary:      Effort: Pulmonary effort is normal. No respiratory distress.      Breath sounds: Normal breath sounds. No wheezing or rales.   Abdominal:      General: Abdomen is flat. Bowel sounds are normal. There is no distension.      Palpations: Abdomen is soft. There is no mass.      Tenderness: There is no abdominal tenderness.   Musculoskeletal:         General: No swelling or deformity. Normal range of motion.      Cervical back: Normal range of motion and neck supple.      Right lower leg: No edema.      Left lower leg: No edema.   Lymphadenopathy:      Cervical: No cervical adenopathy.   Skin:     General: Skin is warm and dry.      Capillary Refill: Capillary refill takes less than 2 seconds.      Findings: No lesion or rash.   Neurological:      General: No focal deficit present.      Mental Status: He is alert and oriented to person, place, and time.      Cranial Nerves: No cranial nerve deficit.      Motor: No weakness.   Psychiatric:         Mood and Affect: Mood normal.         Behavior: Behavior normal.         Thought Content: Thought content normal.         Judgment: Judgment normal.         Assessment/Plan   Problem List Items Addressed This Visit        Coronary artery disease involving native coronary artery of native heart without angina pectoris - Primary    Essential hypertension    Mixed hyperlipidemia    Idiopathic chronic gout of multiple sites without tophus    Type 2 diabetes mellitus without complication, without long-term current use of insulin (CMS/[facility])    Class 1 obesity due to excess calories with serious comorbidity and body mass index (BMI) of 34.0 to 34.9 in adult    Charcot's joint of right foot       Patient education provided.  Stay current with age appropriate health maintenance as instructed.  Appointment here or ER with new or worsening symptoms'  Keep appropriate follow-up visit.  Stay current with proper immunizations    6 months  Follow blood pressure and adjust medication if persistently elevated  Recommend lifestyle modification to improve blood pressure    Endo eval  Cardio eval  Wt loss

## 2023-09-06 DIAGNOSIS — G62.9 POLYNEUROPATHY, UNSPECIFIED: ICD-10-CM

## 2023-09-07 RX ORDER — GABAPENTIN 600 MG/1
600 TABLET ORAL
Qty: 150 TABLET | Refills: 11 | Status: ON HOLD | OUTPATIENT
Start: 2023-09-07 | End: 2023-10-12 | Stop reason: SDUPTHER

## 2023-09-07 RX ORDER — GABAPENTIN 600 MG/1
600 TABLET ORAL 4 TIMES DAILY
Qty: 120 TABLET | Refills: 11 | Status: SHIPPED | OUTPATIENT
Start: 2023-09-07 | End: 2023-09-07 | Stop reason: SDUPTHER

## 2023-09-21 ENCOUNTER — APPOINTMENT (OUTPATIENT)
Dept: PRIMARY CARE | Facility: CLINIC | Age: 58
End: 2023-09-21
Payer: COMMERCIAL

## 2023-09-25 ENCOUNTER — HOSPITAL ENCOUNTER (OUTPATIENT)
Dept: DATA CONVERSION | Facility: HOSPITAL | Age: 58
End: 2023-09-25
Attending: INTERNAL MEDICINE | Admitting: INTERNAL MEDICINE
Payer: COMMERCIAL

## 2023-09-25 DIAGNOSIS — R07.89 OTHER CHEST PAIN: ICD-10-CM

## 2023-09-25 DIAGNOSIS — E78.5 HYPERLIPIDEMIA, UNSPECIFIED: ICD-10-CM

## 2023-09-25 DIAGNOSIS — I10 ESSENTIAL (PRIMARY) HYPERTENSION: ICD-10-CM

## 2023-09-25 DIAGNOSIS — Z95.5 PRESENCE OF CORONARY ANGIOPLASTY IMPLANT AND GRAFT: ICD-10-CM

## 2023-09-25 DIAGNOSIS — T82.855A STENOSIS OF CORONARY ARTERY STENT, INITIAL ENCOUNTER: ICD-10-CM

## 2023-09-25 DIAGNOSIS — Z79.82 LONG TERM (CURRENT) USE OF ASPIRIN: ICD-10-CM

## 2023-09-25 DIAGNOSIS — K76.89 OTHER SPECIFIED DISEASES OF LIVER: ICD-10-CM

## 2023-09-25 DIAGNOSIS — I20.9 ANGINA PECTORIS, UNSPECIFIED (CMS-HCC): ICD-10-CM

## 2023-09-25 DIAGNOSIS — Z82.49 FAMILY HISTORY OF ISCHEMIC HEART DISEASE AND OTHER DISEASES OF THE CIRCULATORY SYSTEM: ICD-10-CM

## 2023-09-25 DIAGNOSIS — I25.2 OLD MYOCARDIAL INFARCTION: ICD-10-CM

## 2023-09-25 DIAGNOSIS — I25.119 ATHEROSCLEROTIC HEART DISEASE OF NATIVE CORONARY ARTERY WITH UNSPECIFIED ANGINA PECTORIS (CMS-HCC): ICD-10-CM

## 2023-09-25 DIAGNOSIS — E11.40 TYPE 2 DIABETES MELLITUS WITH DIABETIC NEUROPATHY, UNSPECIFIED (MULTI): ICD-10-CM

## 2023-09-25 DIAGNOSIS — Z83.3 FAMILY HISTORY OF DIABETES MELLITUS: ICD-10-CM

## 2023-09-25 LAB
ACTIVATED PARTIAL THROMBOPLASTIN TIME IN PPP BY COAGULATION ASSAY: 31 SEC (ref 27–38)
ALANINE AMINOTRANSFERASE (SGPT) (U/L) IN SER/PLAS: 41 U/L (ref 10–52)
ALBUMIN (G/DL) IN SER/PLAS: 4.4 G/DL (ref 3.4–5)
ALKALINE PHOSPHATASE (U/L) IN SER/PLAS: 98 U/L (ref 33–120)
ANION GAP IN SER/PLAS: 14 MMOL/L (ref 10–20)
ANION GAP IN SER/PLAS: 14 MMOL/L (ref 10–20)
ASPARTATE AMINOTRANSFERASE (SGOT) (U/L) IN SER/PLAS: 25 U/L (ref 9–39)
BILIRUBIN TOTAL (MG/DL) IN SER/PLAS: 0.4 MG/DL (ref 0–1.2)
CALCIUM (MG/DL) IN SER/PLAS: 9.6 MG/DL (ref 8.6–10.3)
CALCIUM (MG/DL) IN SER/PLAS: 9.8 MG/DL (ref 8.6–10.3)
CARBON DIOXIDE, TOTAL (MMOL/L) IN SER/PLAS: 22 MMOL/L (ref 21–32)
CARBON DIOXIDE, TOTAL (MMOL/L) IN SER/PLAS: 23 MMOL/L (ref 21–32)
CHLORIDE (MMOL/L) IN SER/PLAS: 101 MMOL/L (ref 98–107)
CHLORIDE (MMOL/L) IN SER/PLAS: 102 MMOL/L (ref 98–107)
CREATININE (MG/DL) IN SER/PLAS: 0.89 MG/DL (ref 0.5–1.3)
CREATININE (MG/DL) IN SER/PLAS: 0.91 MG/DL (ref 0.5–1.3)
ERYTHROCYTE DISTRIBUTION WIDTH (RATIO) BY AUTOMATED COUNT: 12.7 % (ref 11.5–14.5)
ERYTHROCYTE MEAN CORPUSCULAR HEMOGLOBIN CONCENTRATION (G/DL) BY AUTOMATED: 35.4 G/DL (ref 32–36)
ERYTHROCYTE MEAN CORPUSCULAR VOLUME (FL) BY AUTOMATED COUNT: 86 FL (ref 80–100)
ERYTHROCYTES (10*6/UL) IN BLOOD BY AUTOMATED COUNT: 4.5 X10E12/L (ref 4.5–5.9)
ESTIMATED AVERAGE GLUCOSE FOR HBA1C: 183 MG/DL
GFR MALE: >90 ML/MIN/1.73M2
GFR MALE: >90 ML/MIN/1.73M2
GLUCOSE (MG/DL) IN SER/PLAS: 266 MG/DL (ref 74–99)
GLUCOSE (MG/DL) IN SER/PLAS: 323 MG/DL (ref 74–99)
HEMATOCRIT (%) IN BLOOD BY AUTOMATED COUNT: 38.7 % (ref 41–52)
HEMOGLOBIN (G/DL) IN BLOOD: 13.7 G/DL (ref 13.5–17.5)
HEMOGLOBIN A1C/HEMOGLOBIN TOTAL IN BLOOD: 8 %
INR IN PPP BY COAGULATION ASSAY: 1.1 (ref 0.9–1.1)
LEUKOCYTES (10*3/UL) IN BLOOD BY AUTOMATED COUNT: 7.5 X10E9/L (ref 4.4–11.3)
PLATELETS (10*3/UL) IN BLOOD AUTOMATED COUNT: 165 X10E9/L (ref 150–450)
POTASSIUM (MMOL/L) IN SER/PLAS: 4 MMOL/L (ref 3.5–5.3)
POTASSIUM (MMOL/L) IN SER/PLAS: 4.1 MMOL/L (ref 3.5–5.3)
PROTEIN TOTAL: 8.1 G/DL (ref 6.4–8.2)
PROTHROMBIN TIME (PT) IN PPP BY COAGULATION ASSAY: 12 SEC (ref 9.8–12.8)
SODIUM (MMOL/L) IN SER/PLAS: 134 MMOL/L (ref 136–145)
SODIUM (MMOL/L) IN SER/PLAS: 134 MMOL/L (ref 136–145)
UREA NITROGEN (MG/DL) IN SER/PLAS: 22 MG/DL (ref 6–23)
UREA NITROGEN (MG/DL) IN SER/PLAS: 23 MG/DL (ref 6–23)

## 2023-09-26 RX ORDER — INSULIN ASPART 100 [IU]/ML
INJECTION, SOLUTION INTRAVENOUS; SUBCUTANEOUS
Qty: 180 ML | Refills: 3 | Status: SHIPPED | OUTPATIENT
Start: 2023-09-26

## 2023-09-29 VITALS — BODY MASS INDEX: 33.16 KG/M2 | WEIGHT: 258.38 LBS | HEIGHT: 74 IN

## 2023-09-29 VITALS — HEIGHT: 74 IN | WEIGHT: 260.14 LBS | BODY MASS INDEX: 33.39 KG/M2

## 2023-09-30 NOTE — H&P
History & Physical Reviewed:   I have reviewed the History and Physical dated:  25-Sep-2023   History and Physical reviewed and relevant findings noted. Patient examined to review pertinent physical  findings.: No significant changes   Home Medications Reviewed: no changes noted   Allergies Reviewed: no changes noted       Airway/Sedation Assessment:  ·  Mouth Opening OK yes   ·  Neck Flexibility OK yes   ·  Loose Teeth no   ·  Oropharyngeal Classification Class I   ·  ASA PS Classification ASA III   ·  Sedation Plan moderate sedation       ERAS (Enhanced Recovery After Surgery):  ·  ERAS Patient: no     Consent:   COVID-19 Consent:  ·  COVID-19 Risk Consent Surgeon has reviewed key risks related to the risk of heydi COVID-19 and if they contract COVID-19 what the risks are.       Electronic Signatures:  Yoni Schaffer)  (Signed 25-Sep-2023 07:42)   Authored: History & Physical Reviewed, Airway/Sedation,  ERAS, Consent, Note Completion      Last Updated: 25-Sep-2023 07:42 by Yoni Schaffer)

## 2023-10-02 ENCOUNTER — PREP FOR PROCEDURE (OUTPATIENT)
Dept: CARDIOTHORACIC SURGERY | Facility: HOSPITAL | Age: 58
End: 2023-10-02
Payer: COMMERCIAL

## 2023-10-02 DIAGNOSIS — I25.118 CORONARY ARTERY DISEASE OF NATIVE ARTERY OF NATIVE HEART WITH STABLE ANGINA PECTORIS (CMS-HCC): Primary | ICD-10-CM

## 2023-10-02 LAB
ATRIAL RATE: 91 BPM
P AXIS: 32 DEGREES
P OFFSET: 201 MS
P ONSET: 138 MS
PR INTERVAL: 174 MS
Q ONSET: 225 MS
QRS COUNT: 15 BEATS
QRS DURATION: 88 MS
QT INTERVAL: 362 MS
QTC CALCULATION(BAZETT): 445 MS
QTC FREDERICIA: 416 MS
R AXIS: 15 DEGREES
T AXIS: 90 DEGREES
T OFFSET: 406 MS
VENTRICULAR RATE: 91 BPM

## 2023-10-02 NOTE — H&P
History Of Present Illness  Tim Kenney is a 58 y.o. male presenting with stable angina and triple vessel diseasw     Past Medical History  He has a past medical history of Old myocardial infarction.    Surgical History  He has a past surgical history that includes Other surgical history (04/13/2022).     Social History  He reports that he has never smoked. He has never been exposed to tobacco smoke. He has never used smokeless tobacco. He reports that he does not drink alcohol and does not use drugs.    Family History  Family History   Problem Relation Name Age of Onset    Other (Cardiac Disorder) Mother      Breast cancer Mother      Cancer Father          Allergies  Iodinated contrast media    Review of Systems     Physical Exam     Last Recorded Vitals  There were no vitals taken for this visit.    Relevant Results             Assessment/Plan              I spent  minutes in the professional and overall care of this patient.      Behzad Portillo MD

## 2023-10-04 ENCOUNTER — PREP FOR PROCEDURE (OUTPATIENT)
Dept: CARDIOTHORACIC SURGERY | Facility: HOSPITAL | Age: 58
End: 2023-10-04
Payer: COMMERCIAL

## 2023-10-04 DIAGNOSIS — I25.118 CORONARY ARTERY DISEASE OF NATIVE ARTERY OF NATIVE HEART WITH STABLE ANGINA PECTORIS (CMS-HCC): Primary | ICD-10-CM

## 2023-10-04 NOTE — H&P
History Of Present Illness  Tim Kenney is a 58 y.o. male presenting with cad.     Past Medical History  He has a past medical history of Old myocardial infarction.    Surgical History  He has a past surgical history that includes Other surgical history (04/13/2022).     Social History  He reports that he has never smoked. He has never been exposed to tobacco smoke. He has never used smokeless tobacco. He reports that he does not drink alcohol and does not use drugs.    Family History  Family History   Problem Relation Name Age of Onset    Other (Cardiac Disorder) Mother      Breast cancer Mother      Cancer Father          Allergies  Iodinated contrast media    Review of Systems     Physical Exam     Last Recorded Vitals  There were no vitals taken for this visit.    Relevant Results             Assessment/Plan       cabg       I spent  minutes in the professional and overall care of this patient.      Behzad Portillo MD

## 2023-10-05 ENCOUNTER — ANESTHESIA EVENT (OUTPATIENT)
Dept: OPERATING ROOM | Facility: HOSPITAL | Age: 58
DRG: 236 | End: 2023-10-05
Payer: COMMERCIAL

## 2023-10-06 ENCOUNTER — NURSE ONLY (OUTPATIENT)
Dept: CARDIOLOGY | Facility: HOSPITAL | Age: 58
End: 2023-10-06

## 2023-10-06 ENCOUNTER — APPOINTMENT (OUTPATIENT)
Dept: RADIOLOGY | Facility: HOSPITAL | Age: 58
DRG: 236 | End: 2023-10-06
Payer: COMMERCIAL

## 2023-10-06 ENCOUNTER — ANESTHESIA (OUTPATIENT)
Dept: OPERATING ROOM | Facility: HOSPITAL | Age: 58
DRG: 236 | End: 2023-10-06
Payer: COMMERCIAL

## 2023-10-06 ENCOUNTER — APPOINTMENT (OUTPATIENT)
Dept: OPERATING ROOM | Facility: HOSPITAL | Age: 58
DRG: 236 | End: 2023-10-06
Payer: COMMERCIAL

## 2023-10-06 ENCOUNTER — APPOINTMENT (OUTPATIENT)
Dept: CARDIOLOGY | Facility: HOSPITAL | Age: 58
DRG: 236 | End: 2023-10-06
Payer: COMMERCIAL

## 2023-10-06 ENCOUNTER — HOSPITAL ENCOUNTER (INPATIENT)
Facility: HOSPITAL | Age: 58
LOS: 6 days | Discharge: HOME | DRG: 236 | End: 2023-10-12
Attending: THORACIC SURGERY (CARDIOTHORACIC VASCULAR SURGERY) | Admitting: THORACIC SURGERY (CARDIOTHORACIC VASCULAR SURGERY)
Payer: COMMERCIAL

## 2023-10-06 DIAGNOSIS — I25.118 CORONARY ARTERY DISEASE OF NATIVE ARTERY OF NATIVE HEART WITH STABLE ANGINA PECTORIS (CMS-HCC): Primary | ICD-10-CM

## 2023-10-06 DIAGNOSIS — Z95.1 S/P CABG X 4: ICD-10-CM

## 2023-10-06 DIAGNOSIS — I25.729 ATHEROSCLEROSIS OF AUTOLOGOUS ARTERY CORONARY ARTERY BYPASS GRAFT(S) WITH UNSPECIFIED ANGINA PECTORIS (CMS-HCC): ICD-10-CM

## 2023-10-06 DIAGNOSIS — G62.9 POLYNEUROPATHY, UNSPECIFIED: ICD-10-CM

## 2023-10-06 LAB
ABO GROUP (TYPE) IN BLOOD: NORMAL
ABO GROUP (TYPE) IN BLOOD: NORMAL
ALBUMIN SERPL BCP-MCNC: 4 G/DL (ref 3.4–5)
ANION GAP BLDA CALCULATED.4IONS-SCNC: 10 MMO/L (ref 10–25)
ANION GAP BLDA CALCULATED.4IONS-SCNC: 11 MMO/L (ref 10–25)
ANION GAP BLDA CALCULATED.4IONS-SCNC: 12 MMO/L (ref 10–25)
ANION GAP BLDA CALCULATED.4IONS-SCNC: 13 MMO/L (ref 10–25)
ANION GAP BLDA CALCULATED.4IONS-SCNC: 14 MMO/L (ref 10–25)
ANION GAP BLDA CALCULATED.4IONS-SCNC: 17 MMO/L (ref 10–25)
ANION GAP BLDA CALCULATED.4IONS-SCNC: 22 MMO/L (ref 10–25)
ANION GAP BLDA CALCULATED.4IONS-SCNC: 9 MMO/L (ref 10–25)
ANION GAP BLDV CALCULATED.4IONS-SCNC: 16 MMOL/L (ref 10–25)
ANION GAP SERPL CALC-SCNC: 20 MMOL/L (ref 10–20)
ANTIBODY SCREEN: NORMAL
APTT PPP: 26 SECONDS (ref 27–38)
BASE EXCESS BLDA CALC-SCNC: -0.7 MMOL/L (ref -2–3)
BASE EXCESS BLDA CALC-SCNC: -0.8 MMOL/L (ref -2–3)
BASE EXCESS BLDA CALC-SCNC: -1.2 MMOL/L (ref -2–3)
BASE EXCESS BLDA CALC-SCNC: -1.9 MMOL/L (ref -2–3)
BASE EXCESS BLDA CALC-SCNC: -2.5 MMOL/L (ref -2–3)
BASE EXCESS BLDA CALC-SCNC: -3.1 MMOL/L (ref -2–3)
BASE EXCESS BLDA CALC-SCNC: -3.4 MMOL/L (ref -2–3)
BASE EXCESS BLDA CALC-SCNC: -4.2 MMOL/L (ref -2–3)
BASE EXCESS BLDA CALC-SCNC: -5.4 MMOL/L (ref -2–3)
BASE EXCESS BLDA CALC-SCNC: -9.4 MMOL/L (ref -2–3)
BASE EXCESS BLDA CALC-SCNC: 1 MMOL/L (ref -2–3)
BASE EXCESS BLDA CALC-SCNC: 1.2 MMOL/L (ref -2–3)
BASE EXCESS BLDV CALC-SCNC: -4.3 MMOL/L (ref -2–3)
BODY TEMPERATURE: 37 DEGREES CELSIUS
BUN SERPL-MCNC: 14 MG/DL (ref 6–23)
CA-I BLD-SCNC: 1.04 MMOL/L (ref 1.1–1.33)
CA-I BLDA-SCNC: 1.03 MMOL/L (ref 1.1–1.33)
CA-I BLDA-SCNC: 1.04 MMOL/L (ref 1.1–1.33)
CA-I BLDA-SCNC: 1.06 MMOL/L (ref 1.1–1.33)
CA-I BLDA-SCNC: 1.09 MMOL/L (ref 1.1–1.33)
CA-I BLDA-SCNC: 1.1 MMOL/L (ref 1.1–1.33)
CA-I BLDA-SCNC: 1.1 MMOL/L (ref 1.1–1.33)
CA-I BLDA-SCNC: 1.11 MMOL/L (ref 1.1–1.33)
CA-I BLDA-SCNC: 1.13 MMOL/L (ref 1.1–1.33)
CA-I BLDA-SCNC: 1.13 MMOL/L (ref 1.1–1.33)
CA-I BLDA-SCNC: 1.23 MMOL/L (ref 1.1–1.33)
CA-I BLDV-SCNC: 0.9 MMOL/L (ref 1.1–1.33)
CALCIUM SERPL-MCNC: 8.3 MG/DL (ref 8.6–10.6)
CHLORIDE BLDA-SCNC: 104 MMOL/L (ref 98–107)
CHLORIDE BLDA-SCNC: 105 MMOL/L (ref 98–107)
CHLORIDE BLDA-SCNC: 107 MMOL/L (ref 98–107)
CHLORIDE BLDA-SCNC: 108 MMOL/L (ref 98–107)
CHLORIDE BLDA-SCNC: 108 MMOL/L (ref 98–107)
CHLORIDE BLDV-SCNC: 104 MMOL/L (ref 98–107)
CHLORIDE SERPL-SCNC: 104 MMOL/L (ref 98–107)
CO2 SERPL-SCNC: 22 MMOL/L (ref 21–32)
COHGB MFR BLDA: 0.2 %
COHGB MFR BLDA: 0.5 %
COHGB MFR BLDA: 0.6 %
COHGB MFR BLDA: 0.6 %
COHGB MFR BLDA: 0.7 %
COHGB MFR BLDA: 0.7 %
COHGB MFR BLDA: 0.9 %
COHGB MFR BLDA: 1.2 %
COHGB MFR BLDV: 1.4 %
CREAT SERPL-MCNC: 0.98 MG/DL (ref 0.5–1.3)
DO-HGB MFR BLDA: 0.8 % (ref 0–5)
DO-HGB MFR BLDA: 1 % (ref 0–5)
DO-HGB MFR BLDA: 1 % (ref 0–5)
DO-HGB MFR BLDA: 1.1 % (ref 0–5)
DO-HGB MFR BLDA: 1.3 % (ref 0–5)
DO-HGB MFR BLDA: 1.4 % (ref 0–5)
DO-HGB MFR BLDA: 1.8 % (ref 0–5)
DO-HGB MFR BLDA: 2.5 % (ref 0–5)
ERYTHROCYTE [DISTWIDTH] IN BLOOD BY AUTOMATED COUNT: 13.2 % (ref 11.5–14.5)
FIBRINOGEN PPP-MCNC: 222 MG/DL (ref 200–400)
GFR SERPL CREATININE-BSD FRML MDRD: 90 ML/MIN/1.73M*2
GLUCOSE BLDA-MCNC: 150 MG/DL (ref 74–99)
GLUCOSE BLDA-MCNC: 151 MG/DL (ref 74–99)
GLUCOSE BLDA-MCNC: 155 MG/DL (ref 74–99)
GLUCOSE BLDA-MCNC: 156 MG/DL (ref 74–99)
GLUCOSE BLDA-MCNC: 162 MG/DL (ref 74–99)
GLUCOSE BLDA-MCNC: 165 MG/DL (ref 74–99)
GLUCOSE BLDA-MCNC: 167 MG/DL (ref 74–99)
GLUCOSE BLDA-MCNC: 168 MG/DL (ref 74–99)
GLUCOSE BLDA-MCNC: 169 MG/DL (ref 74–99)
GLUCOSE BLDA-MCNC: 179 MG/DL (ref 74–99)
GLUCOSE BLDA-MCNC: 181 MG/DL (ref 74–99)
GLUCOSE BLDA-MCNC: 417 MG/DL (ref 74–99)
GLUCOSE BLDV-MCNC: 135 MG/DL (ref 74–99)
GLUCOSE SERPL-MCNC: 172 MG/DL (ref 74–99)
HCO3 BLDA-SCNC: 16.3 MMOL/L (ref 22–26)
HCO3 BLDA-SCNC: 20.7 MMOL/L (ref 22–26)
HCO3 BLDA-SCNC: 22.1 MMOL/L (ref 22–26)
HCO3 BLDA-SCNC: 22.7 MMOL/L (ref 22–26)
HCO3 BLDA-SCNC: 23.2 MMOL/L (ref 22–26)
HCO3 BLDA-SCNC: 23.7 MMOL/L (ref 22–26)
HCO3 BLDA-SCNC: 25.1 MMOL/L (ref 22–26)
HCO3 BLDA-SCNC: 25.4 MMOL/L (ref 22–26)
HCO3 BLDA-SCNC: 25.7 MMOL/L (ref 22–26)
HCO3 BLDA-SCNC: 25.8 MMOL/L (ref 22–26)
HCO3 BLDA-SCNC: 26 MMOL/L (ref 22–26)
HCO3 BLDA-SCNC: 26.6 MMOL/L (ref 22–26)
HCO3 BLDV-SCNC: 22.1 MMOL/L (ref 22–26)
HCT VFR BLD AUTO: 30.1 % (ref 41–52)
HCT VFR BLD EST: 26 % (ref 41–52)
HCT VFR BLD EST: 30 % (ref 41–52)
HCT VFR BLD EST: 31 % (ref 41–52)
HCT VFR BLD EST: 32 % (ref 41–52)
HCT VFR BLD EST: 33 % (ref 41–52)
HCT VFR BLD EST: 37 % (ref 41–52)
HCT VFR BLD EST: 38 % (ref 41–52)
HGB BLD-MCNC: 10.2 G/DL (ref 13.5–17.5)
HGB BLDA-MCNC: 10 G/DL (ref 13.5–17.5)
HGB BLDA-MCNC: 10.2 G/DL (ref 13.5–17.5)
HGB BLDA-MCNC: 10.2 G/DL (ref 13.5–17.5)
HGB BLDA-MCNC: 10.4 G/DL (ref 13.5–17.5)
HGB BLDA-MCNC: 10.5 G/DL (ref 13.5–17.5)
HGB BLDA-MCNC: 10.8 G/DL (ref 13.5–17.5)
HGB BLDA-MCNC: 10.8 G/DL (ref 13.5–17.5)
HGB BLDA-MCNC: 11.1 G/DL (ref 13.5–17.5)
HGB BLDA-MCNC: 11.1 G/DL (ref 13.5–17.5)
HGB BLDA-MCNC: 12.2 G/DL (ref 13.5–17.5)
HGB BLDA-MCNC: 12.2 G/DL (ref 13.5–17.5)
HGB BLDA-MCNC: 12.5 G/DL (ref 13.5–17.5)
HGB BLDA-MCNC: 12.5 G/DL (ref 13.5–17.5)
HGB BLDA-MCNC: 12.7 G/DL (ref 13.5–17.5)
HGB BLDA-MCNC: 12.7 G/DL (ref 13.5–17.5)
HGB BLDV-MCNC: 8.6 G/DL (ref 13.5–17.5)
INHALED O2 CONCENTRATION: 100 %
INHALED O2 CONCENTRATION: 100 %
INHALED O2 CONCENTRATION: 21 %
INHALED O2 CONCENTRATION: 50 %
INHALED O2 CONCENTRATION: 60 %
INHALED O2 CONCENTRATION: 80 %
INR PPP: 1.2 (ref 0.9–1.1)
LACTATE BLDA-SCNC: 1.7 MMOL/L (ref 0.4–2)
LACTATE BLDA-SCNC: 1.8 MMOL/L (ref 0.4–2)
LACTATE BLDA-SCNC: 2.1 MMOL/L (ref 0.4–2)
LACTATE BLDA-SCNC: 2.4 MMOL/L (ref 0.4–2)
LACTATE BLDA-SCNC: 2.9 MMOL/L (ref 0.4–2)
LACTATE BLDA-SCNC: 3.7 MMOL/L (ref 0.4–2)
LACTATE BLDA-SCNC: 4.7 MMOL/L (ref 0.4–2)
LACTATE BLDA-SCNC: 5.6 MMOL/L (ref 0.4–2)
LACTATE BLDA-SCNC: 6.8 MMOL/L (ref 0.4–2)
LACTATE BLDA-SCNC: 9.6 MMOL/L (ref 0.4–2)
LACTATE BLDV-SCNC: 1.9 MMOL/L (ref 0.4–2)
MAGNESIUM SERPL-MCNC: 2.37 MG/DL (ref 1.6–2.4)
MCH RBC QN AUTO: 29.8 PG (ref 26–34)
MCHC RBC AUTO-ENTMCNC: 33.9 G/DL (ref 32–36)
MCV RBC AUTO: 88 FL (ref 80–100)
METHGB MFR BLDA: 0.5 % (ref 0–1.5)
METHGB MFR BLDA: 0.5 % (ref 0–1.5)
METHGB MFR BLDA: 0.6 % (ref 0–1.5)
METHGB MFR BLDA: 0.6 % (ref 0–1.5)
METHGB MFR BLDA: 0.7 % (ref 0–1.5)
METHGB MFR BLDA: 0.9 % (ref 0–1.5)
METHGB MFR BLDV: 0.4 % (ref 0–1.5)
NRBC BLD-RTO: 0 /100 WBCS (ref 0–0)
OXYHGB MFR BLDA: 95.8 % (ref 94–98)
OXYHGB MFR BLDA: 96.1 % (ref 94–98)
OXYHGB MFR BLDA: 96.1 % (ref 94–98)
OXYHGB MFR BLDA: 96.3 % (ref 94–98)
OXYHGB MFR BLDA: 97 % (ref 94–98)
OXYHGB MFR BLDA: 97.4 % (ref 94–98)
OXYHGB MFR BLDA: 97.5 % (ref 94–98)
OXYHGB MFR BLDA: 97.5 % (ref 94–98)
OXYHGB MFR BLDA: 97.7 % (ref 94–98)
OXYHGB MFR BLDA: 97.7 % (ref 94–98)
OXYHGB MFR BLDA: 97.8 % (ref 94–98)
OXYHGB MFR BLDA: 97.8 % (ref 94–98)
OXYHGB MFR BLDV: 77.8 % (ref 45–75)
PCO2 BLDA: 34 MM HG (ref 38–42)
PCO2 BLDA: 42 MM HG (ref 38–42)
PCO2 BLDA: 42 MM HG (ref 38–42)
PCO2 BLDA: 44 MM HG (ref 38–42)
PCO2 BLDA: 45 MM HG (ref 38–42)
PCO2 BLDA: 45 MM HG (ref 38–42)
PCO2 BLDA: 46 MM HG (ref 38–42)
PCO2 BLDA: 46 MM HG (ref 38–42)
PCO2 BLDA: 47 MM HG (ref 38–42)
PCO2 BLDA: 50 MM HG (ref 38–42)
PCO2 BLDA: 51 MM HG (ref 38–42)
PCO2 BLDA: 51 MM HG (ref 38–42)
PCO2 BLDV: 46 MM HG (ref 41–51)
PH BLDA: 7.29 PH (ref 7.38–7.42)
PH BLDA: 7.3 PH (ref 7.38–7.42)
PH BLDA: 7.31 PH (ref 7.38–7.42)
PH BLDA: 7.31 PH (ref 7.38–7.42)
PH BLDA: 7.32 PH (ref 7.38–7.42)
PH BLDA: 7.34 PH (ref 7.38–7.42)
PH BLDA: 7.38 PH (ref 7.38–7.42)
PH BLDA: 7.4 PH (ref 7.38–7.42)
PH BLDV: 7.29 PH (ref 7.33–7.43)
PHOSPHATE SERPL-MCNC: 3.5 MG/DL (ref 2.5–4.9)
PLATELET # BLD AUTO: 182 X10*3/UL (ref 150–450)
PMV BLD AUTO: 10.9 FL (ref 7.5–11.5)
PO2 BLDA: 100 MM HG (ref 85–95)
PO2 BLDA: 117 MM HG (ref 85–95)
PO2 BLDA: 171 MM HG (ref 85–95)
PO2 BLDA: 172 MM HG (ref 85–95)
PO2 BLDA: 191 MM HG (ref 85–95)
PO2 BLDA: 192 MM HG (ref 85–95)
PO2 BLDA: 223 MM HG (ref 85–95)
PO2 BLDA: 248 MM HG (ref 85–95)
PO2 BLDA: 276 MM HG (ref 85–95)
PO2 BLDA: 282 MM HG (ref 85–95)
PO2 BLDA: 87 MM HG (ref 85–95)
PO2 BLDA: 95 MM HG (ref 85–95)
PO2 BLDV: 47 MM HG (ref 35–45)
POTASSIUM BLDA-SCNC: 3.3 MMOL/L (ref 3.5–5.3)
POTASSIUM BLDA-SCNC: 3.4 MMOL/L (ref 3.5–5.3)
POTASSIUM BLDA-SCNC: 3.6 MMOL/L (ref 3.5–5.3)
POTASSIUM BLDA-SCNC: 4 MMOL/L (ref 3.5–5.3)
POTASSIUM BLDA-SCNC: 4.2 MMOL/L (ref 3.5–5.3)
POTASSIUM BLDA-SCNC: 4.5 MMOL/L (ref 3.5–5.3)
POTASSIUM BLDA-SCNC: 4.7 MMOL/L (ref 3.5–5.3)
POTASSIUM BLDA-SCNC: 4.8 MMOL/L (ref 3.5–5.3)
POTASSIUM BLDA-SCNC: 4.8 MMOL/L (ref 3.5–5.3)
POTASSIUM BLDA-SCNC: 5.4 MMOL/L (ref 3.5–5.3)
POTASSIUM BLDV-SCNC: 5 MMOL/L (ref 3.5–5.3)
POTASSIUM SERPL-SCNC: 3.4 MMOL/L (ref 3.5–5.3)
PROTHROMBIN TIME: 13.5 SECONDS (ref 9.8–12.8)
RBC # BLD AUTO: 3.42 X10*6/UL (ref 4.5–5.9)
RH FACTOR (ANTIGEN D): NORMAL
RH FACTOR (ANTIGEN D): NORMAL
SAO2 % BLDA: 97 % (ref 94–100)
SAO2 % BLDA: 98 % (ref 94–100)
SAO2 % BLDA: 99 % (ref 94–100)
SAO2 % BLDV: 79 % (ref 45–75)
SODIUM BLDA-SCNC: 135 MMOL/L (ref 136–145)
SODIUM BLDA-SCNC: 136 MMOL/L (ref 136–145)
SODIUM BLDA-SCNC: 137 MMOL/L (ref 136–145)
SODIUM BLDA-SCNC: 138 MMOL/L (ref 136–145)
SODIUM BLDA-SCNC: 138 MMOL/L (ref 136–145)
SODIUM BLDA-SCNC: 139 MMOL/L (ref 136–145)
SODIUM BLDA-SCNC: 140 MMOL/L (ref 136–145)
SODIUM BLDA-SCNC: 141 MMOL/L (ref 136–145)
SODIUM BLDV-SCNC: 137 MMOL/L (ref 136–145)
SODIUM SERPL-SCNC: 143 MMOL/L (ref 136–145)
WBC # BLD AUTO: 31.6 X10*3/UL (ref 4.4–11.3)

## 2023-10-06 PROCEDURE — 86850 RBC ANTIBODY SCREEN: CPT | Performed by: ANESTHESIOLOGY

## 2023-10-06 PROCEDURE — 02100Z8 BYPASS CORONARY ARTERY, ONE ARTERY FROM RIGHT INTERNAL MAMMARY, OPEN APPROACH: ICD-10-PCS | Performed by: THORACIC SURGERY (CARDIOTHORACIC VASCULAR SURGERY)

## 2023-10-06 PROCEDURE — 2500000004 HC RX 250 GENERAL PHARMACY W/ HCPCS (ALT 636 FOR OP/ED): Performed by: STUDENT IN AN ORGANIZED HEALTH CARE EDUCATION/TRAINING PROGRAM

## 2023-10-06 PROCEDURE — 2500000005 HC RX 250 GENERAL PHARMACY W/O HCPCS: Performed by: NURSE ANESTHETIST, CERTIFIED REGISTERED

## 2023-10-06 PROCEDURE — 76937 US GUIDE VASCULAR ACCESS: CPT | Performed by: NURSE ANESTHETIST, CERTIFIED REGISTERED

## 2023-10-06 PROCEDURE — 3700000002 HC GENERAL ANESTHESIA TIME - EACH INCREMENTAL 1 MINUTE: Performed by: THORACIC SURGERY (CARDIOTHORACIC VASCULAR SURGERY)

## 2023-10-06 PROCEDURE — C1713 ANCHOR/SCREW BN/BN,TIS/BN: HCPCS | Performed by: THORACIC SURGERY (CARDIOTHORACIC VASCULAR SURGERY)

## 2023-10-06 PROCEDURE — 3600000018 HC OR TIME - INITIAL BASE CHARGE - PROCEDURE LEVEL SIX: Performed by: THORACIC SURGERY (CARDIOTHORACIC VASCULAR SURGERY)

## 2023-10-06 PROCEDURE — 82435 ASSAY OF BLOOD CHLORIDE: CPT | Performed by: STUDENT IN AN ORGANIZED HEALTH CARE EDUCATION/TRAINING PROGRAM

## 2023-10-06 PROCEDURE — S0109 METHADONE ORAL 5MG: HCPCS | Performed by: NURSE ANESTHETIST, CERTIFIED REGISTERED

## 2023-10-06 PROCEDURE — 33517 CABG ARTERY-VEIN SINGLE: CPT | Performed by: THORACIC SURGERY (CARDIOTHORACIC VASCULAR SURGERY)

## 2023-10-06 PROCEDURE — 2500000001 HC RX 250 WO HCPCS SELF ADMINISTERED DRUGS (ALT 637 FOR MEDICARE OP): Performed by: STUDENT IN AN ORGANIZED HEALTH CARE EDUCATION/TRAINING PROGRAM

## 2023-10-06 PROCEDURE — 85018 HEMOGLOBIN: CPT

## 2023-10-06 PROCEDURE — 36415 COLL VENOUS BLD VENIPUNCTURE: CPT | Performed by: NURSE ANESTHETIST, CERTIFIED REGISTERED

## 2023-10-06 PROCEDURE — 2500000004 HC RX 250 GENERAL PHARMACY W/ HCPCS (ALT 636 FOR OP/ED): Performed by: THORACIC SURGERY (CARDIOTHORACIC VASCULAR SURGERY)

## 2023-10-06 PROCEDURE — 3600000011 HC PERFUSION TIME - INITIAL BASE CHARGE: Performed by: THORACIC SURGERY (CARDIOTHORACIC VASCULAR SURGERY)

## 2023-10-06 PROCEDURE — A33536 PR CABG, ARTERIAL, FOUR+: Performed by: ANESTHESIOLOGY

## 2023-10-06 PROCEDURE — 2580000001 HC RX 258 IV SOLUTIONS: Performed by: STUDENT IN AN ORGANIZED HEALTH CARE EDUCATION/TRAINING PROGRAM

## 2023-10-06 PROCEDURE — 36415 COLL VENOUS BLD VENIPUNCTURE: CPT | Performed by: ANESTHESIOLOGY

## 2023-10-06 PROCEDURE — 2780000003 HC OR 278 NO HCPCS: Performed by: THORACIC SURGERY (CARDIOTHORACIC VASCULAR SURGERY)

## 2023-10-06 PROCEDURE — 36556 INSERT NON-TUNNEL CV CATH: CPT | Performed by: NURSE ANESTHETIST, CERTIFIED REGISTERED

## 2023-10-06 PROCEDURE — 82805 BLOOD GASES W/O2 SATURATION: CPT

## 2023-10-06 PROCEDURE — 3700000001 HC GENERAL ANESTHESIA TIME - INITIAL BASE CHARGE: Performed by: THORACIC SURGERY (CARDIOTHORACIC VASCULAR SURGERY)

## 2023-10-06 PROCEDURE — 85384 FIBRINOGEN ACTIVITY: CPT

## 2023-10-06 PROCEDURE — 94002 VENT MGMT INPAT INIT DAY: CPT

## 2023-10-06 PROCEDURE — P9047 ALBUMIN (HUMAN), 25%, 50ML: HCPCS | Mod: JZ | Performed by: THORACIC SURGERY (CARDIOTHORACIC VASCULAR SURGERY)

## 2023-10-06 PROCEDURE — 2500000005 HC RX 250 GENERAL PHARMACY W/O HCPCS: Performed by: THORACIC SURGERY (CARDIOTHORACIC VASCULAR SURGERY)

## 2023-10-06 PROCEDURE — 85347 COAGULATION TIME ACTIVATED: CPT

## 2023-10-06 PROCEDURE — 33535 CABG ARTERIAL THREE: CPT | Performed by: THORACIC SURGERY (CARDIOTHORACIC VASCULAR SURGERY)

## 2023-10-06 PROCEDURE — 2500000004 HC RX 250 GENERAL PHARMACY W/ HCPCS (ALT 636 FOR OP/ED): Performed by: NURSE ANESTHETIST, CERTIFIED REGISTERED

## 2023-10-06 PROCEDURE — 33508 ENDOSCOPIC VEIN HARVEST: CPT | Performed by: THORACIC SURGERY (CARDIOTHORACIC VASCULAR SURGERY)

## 2023-10-06 PROCEDURE — 33509 NDSC HRV UXTR ART 1 SGM CAB: CPT | Performed by: THORACIC SURGERY (CARDIOTHORACIC VASCULAR SURGERY)

## 2023-10-06 PROCEDURE — P9045 ALBUMIN (HUMAN), 5%, 250 ML: HCPCS | Mod: JZ

## 2023-10-06 PROCEDURE — 37799 UNLISTED PX VASCULAR SURGERY: CPT | Performed by: STUDENT IN AN ORGANIZED HEALTH CARE EDUCATION/TRAINING PROGRAM

## 2023-10-06 PROCEDURE — 83050 HGB METHEMOGLOBIN QUAN: CPT

## 2023-10-06 PROCEDURE — 2020000001 HC ICU ROOM DAILY

## 2023-10-06 PROCEDURE — 82330 ASSAY OF CALCIUM: CPT

## 2023-10-06 PROCEDURE — 36620 INSERTION CATHETER ARTERY: CPT | Performed by: NURSE ANESTHETIST, CERTIFIED REGISTERED

## 2023-10-06 PROCEDURE — 2720000007 HC OR 272 NO HCPCS: Performed by: THORACIC SURGERY (CARDIOTHORACIC VASCULAR SURGERY)

## 2023-10-06 PROCEDURE — 82435 ASSAY OF BLOOD CHLORIDE: CPT

## 2023-10-06 PROCEDURE — B24BZZ4 ULTRASONOGRAPHY OF HEART WITH AORTA, TRANSESOPHAGEAL: ICD-10-PCS | Performed by: THORACIC SURGERY (CARDIOTHORACIC VASCULAR SURGERY)

## 2023-10-06 PROCEDURE — 82330 ASSAY OF CALCIUM: CPT | Performed by: STUDENT IN AN ORGANIZED HEALTH CARE EDUCATION/TRAINING PROGRAM

## 2023-10-06 PROCEDURE — 82375 ASSAY CARBOXYHB QUANT: CPT

## 2023-10-06 PROCEDURE — 06BP4ZZ EXCISION OF RIGHT SAPHENOUS VEIN, PERCUTANEOUS ENDOSCOPIC APPROACH: ICD-10-PCS | Performed by: THORACIC SURGERY (CARDIOTHORACIC VASCULAR SURGERY)

## 2023-10-06 PROCEDURE — 84132 ASSAY OF SERUM POTASSIUM: CPT

## 2023-10-06 PROCEDURE — 021009W BYPASS CORONARY ARTERY, ONE ARTERY FROM AORTA WITH AUTOLOGOUS VENOUS TISSUE, OPEN APPROACH: ICD-10-PCS | Performed by: THORACIC SURGERY (CARDIOTHORACIC VASCULAR SURGERY)

## 2023-10-06 PROCEDURE — 84295 ASSAY OF SERUM SODIUM: CPT

## 2023-10-06 PROCEDURE — 83735 ASSAY OF MAGNESIUM: CPT | Performed by: STUDENT IN AN ORGANIZED HEALTH CARE EDUCATION/TRAINING PROGRAM

## 2023-10-06 PROCEDURE — 2500000002 HC RX 250 W HCPCS SELF ADMINISTERED DRUGS (ALT 637 FOR MEDICARE OP, ALT 636 FOR OP/ED): Performed by: NURSE ANESTHETIST, CERTIFIED REGISTERED

## 2023-10-06 PROCEDURE — A4217 STERILE WATER/SALINE, 500 ML: HCPCS | Performed by: THORACIC SURGERY (CARDIOTHORACIC VASCULAR SURGERY)

## 2023-10-06 PROCEDURE — P9045 ALBUMIN (HUMAN), 5%, 250 ML: HCPCS | Mod: JZ | Performed by: NURSE ANESTHETIST, CERTIFIED REGISTERED

## 2023-10-06 PROCEDURE — 3600000017 HC OR TIME - EACH INCREMENTAL 1 MINUTE - PROCEDURE LEVEL SIX: Performed by: THORACIC SURGERY (CARDIOTHORACIC VASCULAR SURGERY)

## 2023-10-06 PROCEDURE — 99291 CRITICAL CARE FIRST HOUR: CPT | Performed by: STUDENT IN AN ORGANIZED HEALTH CARE EDUCATION/TRAINING PROGRAM

## 2023-10-06 PROCEDURE — 71045 X-RAY EXAM CHEST 1 VIEW: CPT | Performed by: STUDENT IN AN ORGANIZED HEALTH CARE EDUCATION/TRAINING PROGRAM

## 2023-10-06 PROCEDURE — 85384 FIBRINOGEN ACTIVITY: CPT | Performed by: STUDENT IN AN ORGANIZED HEALTH CARE EDUCATION/TRAINING PROGRAM

## 2023-10-06 PROCEDURE — 02100Z9 BYPASS CORONARY ARTERY, ONE ARTERY FROM LEFT INTERNAL MAMMARY, OPEN APPROACH: ICD-10-PCS | Performed by: THORACIC SURGERY (CARDIOTHORACIC VASCULAR SURGERY)

## 2023-10-06 PROCEDURE — A33536 PR CABG, ARTERIAL, FOUR+: Performed by: NURSE ANESTHETIST, CERTIFIED REGISTERED

## 2023-10-06 PROCEDURE — 82947 ASSAY GLUCOSE BLOOD QUANT: CPT

## 2023-10-06 PROCEDURE — 76942 ECHO GUIDE FOR BIOPSY: CPT | Performed by: STUDENT IN AN ORGANIZED HEALTH CARE EDUCATION/TRAINING PROGRAM

## 2023-10-06 PROCEDURE — 82947 ASSAY GLUCOSE BLOOD QUANT: CPT | Performed by: STUDENT IN AN ORGANIZED HEALTH CARE EDUCATION/TRAINING PROGRAM

## 2023-10-06 PROCEDURE — 03BC4ZZ EXCISION OF LEFT RADIAL ARTERY, PERCUTANEOUS ENDOSCOPIC APPROACH: ICD-10-PCS | Performed by: THORACIC SURGERY (CARDIOTHORACIC VASCULAR SURGERY)

## 2023-10-06 PROCEDURE — 5A1221Z PERFORMANCE OF CARDIAC OUTPUT, CONTINUOUS: ICD-10-PCS | Performed by: THORACIC SURGERY (CARDIOTHORACIC VASCULAR SURGERY)

## 2023-10-06 PROCEDURE — 2500000005 HC RX 250 GENERAL PHARMACY W/O HCPCS: Performed by: STUDENT IN AN ORGANIZED HEALTH CARE EDUCATION/TRAINING PROGRAM

## 2023-10-06 PROCEDURE — 3600000012 HC PERFUSION TIME - EACH INCREMENTAL 1 MINUTE: Performed by: THORACIC SURGERY (CARDIOTHORACIC VASCULAR SURGERY)

## 2023-10-06 PROCEDURE — 85027 COMPLETE CBC AUTOMATED: CPT | Performed by: STUDENT IN AN ORGANIZED HEALTH CARE EDUCATION/TRAINING PROGRAM

## 2023-10-06 PROCEDURE — 85730 THROMBOPLASTIN TIME PARTIAL: CPT | Performed by: STUDENT IN AN ORGANIZED HEALTH CARE EDUCATION/TRAINING PROGRAM

## 2023-10-06 PROCEDURE — 2500000004 HC RX 250 GENERAL PHARMACY W/ HCPCS (ALT 636 FOR OP/ED)

## 2023-10-06 PROCEDURE — 71045 X-RAY EXAM CHEST 1 VIEW: CPT

## 2023-10-06 PROCEDURE — 02100AW BYPASS CORONARY ARTERY, ONE ARTERY FROM AORTA WITH AUTOLOGOUS ARTERIAL TISSUE, OPEN APPROACH: ICD-10-PCS | Performed by: THORACIC SURGERY (CARDIOTHORACIC VASCULAR SURGERY)

## 2023-10-06 PROCEDURE — 86900 BLOOD TYPING SEROLOGIC ABO: CPT | Performed by: ANESTHESIOLOGY

## 2023-10-06 PROCEDURE — 96372 THER/PROPH/DIAG INJ SC/IM: CPT | Performed by: NURSE ANESTHETIST, CERTIFIED REGISTERED

## 2023-10-06 DEVICE — PLATE, LP MANUBRIUM: Type: IMPLANTABLE DEVICE | Site: STERNUM | Status: FUNCTIONAL

## 2023-10-06 DEVICE — KIT, CELL SAVER, W/COLLECTION SET, 225ML WASH SET: Type: IMPLANTABLE DEVICE | Site: CHEST | Status: NON-FUNCTIONAL

## 2023-10-06 DEVICE — CANNULA, VENOUS, 2-STAGE, 32/40 ROUND: Type: IMPLANTABLE DEVICE | Site: CHEST | Status: NON-FUNCTIONAL

## 2023-10-06 DEVICE — X-PLATE, LOW PROFILE: Type: IMPLANTABLE DEVICE | Site: STERNUM | Status: FUNCTIONAL

## 2023-10-06 DEVICE — SCREW, SD LOCKING, 2.3 X 14MM: Type: IMPLANTABLE DEVICE | Site: STERNUM | Status: FUNCTIONAL

## 2023-10-06 DEVICE — LEAD, PACING, MYOCARDIAL, BIPOLAR, TEMPORARY: Type: IMPLANTABLE DEVICE | Site: CHEST | Status: NON-FUNCTIONAL

## 2023-10-06 DEVICE — L-PLATE, LOW PROFILE: Type: IMPLANTABLE DEVICE | Site: STERNUM | Status: FUNCTIONAL

## 2023-10-06 DEVICE — KIT, CARDIOPLEGIA, VANGUARD HEAT EXCHANGER: Type: IMPLANTABLE DEVICE | Site: CHEST | Status: NON-FUNCTIONAL

## 2023-10-06 DEVICE — IMPLANTABLE DEVICE: Type: IMPLANTABLE DEVICE | Site: CHEST | Status: NON-FUNCTIONAL

## 2023-10-06 RX ORDER — VANCOMYCIN HYDROCHLORIDE 1 G/20ML
INJECTION, POWDER, LYOPHILIZED, FOR SOLUTION INTRAVENOUS AS NEEDED
Status: DISCONTINUED | OUTPATIENT
Start: 2023-10-06 | End: 2023-10-06 | Stop reason: HOSPADM

## 2023-10-06 RX ORDER — CALCIUM CHLORIDE INJECTION 100 MG/ML
INJECTION, SOLUTION INTRAVENOUS AS NEEDED
Status: DISCONTINUED | OUTPATIENT
Start: 2023-10-06 | End: 2023-10-06

## 2023-10-06 RX ORDER — INDOMETHACIN 25 MG/1
CAPSULE ORAL AS NEEDED
Status: DISCONTINUED | OUTPATIENT
Start: 2023-10-06 | End: 2023-10-06

## 2023-10-06 RX ORDER — PANTOPRAZOLE SODIUM 40 MG/10ML
40 INJECTION, POWDER, LYOPHILIZED, FOR SOLUTION INTRAVENOUS
Status: DISCONTINUED | OUTPATIENT
Start: 2023-10-07 | End: 2023-10-08

## 2023-10-06 RX ORDER — METHADONE HYDROCHLORIDE 10 MG/1
TABLET ORAL AS NEEDED
Status: DISCONTINUED | OUTPATIENT
Start: 2023-10-06 | End: 2023-10-06

## 2023-10-06 RX ORDER — MAGNESIUM SULFATE HEPTAHYDRATE 40 MG/ML
2 INJECTION, SOLUTION INTRAVENOUS EVERY 6 HOURS PRN
Status: DISCONTINUED | OUTPATIENT
Start: 2023-10-06 | End: 2023-10-09

## 2023-10-06 RX ORDER — DEXTROSE 50 % IN WATER (D50W) INTRAVENOUS SYRINGE
25
Status: DISCONTINUED | OUTPATIENT
Start: 2023-10-06 | End: 2023-10-09

## 2023-10-06 RX ORDER — NOREPINEPHRINE BITARTRATE/D5W 8 MG/250ML
0-1 PLASTIC BAG, INJECTION (ML) INTRAVENOUS CONTINUOUS
Status: DISCONTINUED | OUTPATIENT
Start: 2023-10-06 | End: 2023-10-07

## 2023-10-06 RX ORDER — INSULIN ASPART 100 [IU]/ML
INJECTION, SOLUTION INTRAVENOUS; SUBCUTANEOUS
COMMUNITY

## 2023-10-06 RX ORDER — PAPAVERINE HYDROCHLORIDE 30 MG/ML
INJECTION INTRAMUSCULAR; INTRAVENOUS AS NEEDED
Status: DISCONTINUED | OUTPATIENT
Start: 2023-10-06 | End: 2023-10-06 | Stop reason: HOSPADM

## 2023-10-06 RX ORDER — MAGNESIUM SULFATE HEPTAHYDRATE 40 MG/ML
4 INJECTION, SOLUTION INTRAVENOUS EVERY 6 HOURS PRN
Status: DISCONTINUED | OUTPATIENT
Start: 2023-10-06 | End: 2023-10-09

## 2023-10-06 RX ORDER — ISOFLURANE 1 ML/ML
LIQUID RESPIRATORY (INHALATION) AS NEEDED
Status: DISCONTINUED | OUTPATIENT
Start: 2023-10-06 | End: 2023-10-06

## 2023-10-06 RX ORDER — HEPARIN SODIUM 1000 [USP'U]/ML
INJECTION, SOLUTION INTRAVENOUS; SUBCUTANEOUS AS NEEDED
Status: DISCONTINUED | OUTPATIENT
Start: 2023-10-06 | End: 2023-10-06

## 2023-10-06 RX ORDER — EPINEPHRINE HCL IN DEXTROSE 5% 4MG/250ML
0-2 PLASTIC BAG, INJECTION (ML) INTRAVENOUS CONTINUOUS
Status: DISCONTINUED | OUTPATIENT
Start: 2023-10-06 | End: 2023-10-07

## 2023-10-06 RX ORDER — POTASSIUM CHLORIDE 29.8 MG/ML
40 INJECTION INTRAVENOUS EVERY 6 HOURS PRN
Status: DISCONTINUED | OUTPATIENT
Start: 2023-10-06 | End: 2023-10-09

## 2023-10-06 RX ORDER — NAPROXEN SODIUM 220 MG/1
81 TABLET, FILM COATED ORAL DAILY
Status: DISCONTINUED | OUTPATIENT
Start: 2023-10-07 | End: 2023-10-06

## 2023-10-06 RX ORDER — SODIUM CHLORIDE, SODIUM GLUCONATE, SODIUM ACETATE, POTASSIUM CHLORIDE AND MAGNESIUM CHLORIDE 30; 37; 368; 526; 502 MG/100ML; MG/100ML; MG/100ML; MG/100ML; MG/100ML
INJECTION, SOLUTION INTRAVENOUS CONTINUOUS PRN
Status: DISCONTINUED | OUTPATIENT
Start: 2023-10-06 | End: 2023-10-06

## 2023-10-06 RX ORDER — ROCURONIUM BROMIDE 10 MG/ML
INJECTION, SOLUTION INTRAVENOUS AS NEEDED
Status: DISCONTINUED | OUTPATIENT
Start: 2023-10-06 | End: 2023-10-06

## 2023-10-06 RX ORDER — LISINOPRIL 2.5 MG/1
2.5 TABLET ORAL DAILY
COMMUNITY

## 2023-10-06 RX ORDER — CALCIUM GLUCONATE 20 MG/ML
1 INJECTION, SOLUTION INTRAVENOUS EVERY 6 HOURS PRN
Status: DISCONTINUED | OUTPATIENT
Start: 2023-10-06 | End: 2023-10-09

## 2023-10-06 RX ORDER — HYDROMORPHONE HYDROCHLORIDE 1 MG/ML
0.2 INJECTION, SOLUTION INTRAMUSCULAR; INTRAVENOUS; SUBCUTANEOUS
Status: DISCONTINUED | OUTPATIENT
Start: 2023-10-06 | End: 2023-10-09

## 2023-10-06 RX ORDER — NEOSTIGMINE METHYLSULFATE 0.5 MG/ML
2 INJECTION, SOLUTION INTRAVENOUS ONCE
Status: COMPLETED | OUTPATIENT
Start: 2023-10-06 | End: 2023-10-06

## 2023-10-06 RX ORDER — NITROGLYCERIN 20 MG/100ML
INJECTION INTRAVENOUS CONTINUOUS PRN
Status: DISCONTINUED | OUTPATIENT
Start: 2023-10-06 | End: 2023-10-06

## 2023-10-06 RX ORDER — ALBUMIN HUMAN 50 G/1000ML
25 SOLUTION INTRAVENOUS ONCE
Status: COMPLETED | OUTPATIENT
Start: 2023-10-06 | End: 2023-10-06

## 2023-10-06 RX ORDER — ACETAMINOPHEN 325 MG/1
650 TABLET ORAL EVERY 4 HOURS
Status: DISCONTINUED | OUTPATIENT
Start: 2023-10-06 | End: 2023-10-06

## 2023-10-06 RX ORDER — ACETAMINOPHEN 650 MG/1
650 SUPPOSITORY RECTAL EVERY 4 HOURS
Status: DISCONTINUED | OUTPATIENT
Start: 2023-10-06 | End: 2023-10-06

## 2023-10-06 RX ORDER — PROPOFOL 10 MG/ML
INJECTION, EMULSION INTRAVENOUS
Status: COMPLETED
Start: 2023-10-06 | End: 2023-10-06

## 2023-10-06 RX ORDER — MIDAZOLAM HYDROCHLORIDE 1 MG/ML
INJECTION, SOLUTION INTRAMUSCULAR; INTRAVENOUS AS NEEDED
Status: DISCONTINUED | OUTPATIENT
Start: 2023-10-06 | End: 2023-10-06

## 2023-10-06 RX ORDER — CALCIUM GLUCONATE 20 MG/ML
2 INJECTION, SOLUTION INTRAVENOUS EVERY 6 HOURS PRN
Status: DISCONTINUED | OUTPATIENT
Start: 2023-10-06 | End: 2023-10-09

## 2023-10-06 RX ORDER — LIDOCAINE HYDROCHLORIDE 20 MG/ML
INJECTION, SOLUTION INFILTRATION; PERINEURAL AS NEEDED
Status: DISCONTINUED | OUTPATIENT
Start: 2023-10-06 | End: 2023-10-06

## 2023-10-06 RX ORDER — NALOXONE HYDROCHLORIDE 0.4 MG/ML
0.2 INJECTION, SOLUTION INTRAMUSCULAR; INTRAVENOUS; SUBCUTANEOUS EVERY 5 MIN PRN
Status: DISCONTINUED | OUTPATIENT
Start: 2023-10-06 | End: 2023-10-09

## 2023-10-06 RX ORDER — PHENYLEPHRINE 10 MG/250 ML(40 MCG/ML)IN 0.9 % SOD.CHLORIDE INTRAVENOUS
CONTINUOUS PRN
Status: DISCONTINUED | OUTPATIENT
Start: 2023-10-06 | End: 2023-10-06

## 2023-10-06 RX ORDER — DOCUSATE SODIUM 100 MG/1
100 CAPSULE, LIQUID FILLED ORAL 2 TIMES DAILY
Status: DISCONTINUED | OUTPATIENT
Start: 2023-10-06 | End: 2023-10-09

## 2023-10-06 RX ORDER — LIDOCAINE HYDROCHLORIDE 10 MG/ML
INJECTION INFILTRATION; PERINEURAL AS NEEDED
Status: DISCONTINUED | OUTPATIENT
Start: 2023-10-06 | End: 2023-10-06

## 2023-10-06 RX ORDER — PROPOFOL 10 MG/ML
5-20 INJECTION, EMULSION INTRAVENOUS CONTINUOUS
Status: DISCONTINUED | OUTPATIENT
Start: 2023-10-07 | End: 2023-10-07

## 2023-10-06 RX ORDER — SODIUM CHLORIDE 0.9 G/100ML
IRRIGANT IRRIGATION AS NEEDED
Status: DISCONTINUED | OUTPATIENT
Start: 2023-10-06 | End: 2023-10-06 | Stop reason: HOSPADM

## 2023-10-06 RX ORDER — GLYCOPYRROLATE 0.2 MG/ML
0.4 INJECTION INTRAMUSCULAR; INTRAVENOUS ONCE
Status: COMPLETED | OUTPATIENT
Start: 2023-10-06 | End: 2023-10-07

## 2023-10-06 RX ORDER — ALBUMIN HUMAN 50 G/1000ML
SOLUTION INTRAVENOUS AS NEEDED
Status: DISCONTINUED | OUTPATIENT
Start: 2023-10-06 | End: 2023-10-06

## 2023-10-06 RX ORDER — ALBUMIN HUMAN 50 G/1000ML
SOLUTION INTRAVENOUS
Status: COMPLETED
Start: 2023-10-06 | End: 2023-10-06

## 2023-10-06 RX ORDER — FENTANYL CITRATE 50 UG/ML
INJECTION, SOLUTION INTRAMUSCULAR; INTRAVENOUS AS NEEDED
Status: DISCONTINUED | OUTPATIENT
Start: 2023-10-06 | End: 2023-10-06

## 2023-10-06 RX ORDER — ALBUMIN HUMAN 50 G/1000ML
SOLUTION INTRAVENOUS
Status: COMPLETED
Start: 2023-10-06 | End: 2023-10-07

## 2023-10-06 RX ORDER — FENTANYL CITRATE-0.9 % NACL/PF 10 MCG/ML
PLASTIC BAG, INJECTION (ML) INTRAVENOUS CONTINUOUS PRN
Status: DISCONTINUED | OUTPATIENT
Start: 2023-10-06 | End: 2023-10-06

## 2023-10-06 RX ORDER — EPINEPHRINE HCL IN 0.9 % NACL 4MG/250ML
PLASTIC BAG, INJECTION (ML) INTRAVENOUS CONTINUOUS PRN
Status: DISCONTINUED | OUTPATIENT
Start: 2023-10-06 | End: 2023-10-06

## 2023-10-06 RX ORDER — ASPIRIN 300 MG/1
150 SUPPOSITORY RECTAL DAILY
Status: DISCONTINUED | OUTPATIENT
Start: 2023-10-07 | End: 2023-10-06

## 2023-10-06 RX ORDER — CEFAZOLIN SODIUM 2 G/100ML
2 INJECTION, SOLUTION INTRAVENOUS EVERY 8 HOURS
Status: DISCONTINUED | OUTPATIENT
Start: 2023-10-06 | End: 2023-10-06

## 2023-10-06 RX ORDER — PROPOFOL 10 MG/ML
30-50 INJECTION, EMULSION INTRAVENOUS CONTINUOUS
Status: DISCONTINUED | OUTPATIENT
Start: 2023-10-06 | End: 2023-10-06

## 2023-10-06 RX ORDER — POTASSIUM CHLORIDE 14.9 MG/ML
20 INJECTION INTRAVENOUS EVERY 6 HOURS PRN
Status: DISCONTINUED | OUTPATIENT
Start: 2023-10-06 | End: 2023-10-09

## 2023-10-06 RX ORDER — TESTOSTERONE 50 MG/5G
50 GEL TRANSDERMAL 2 TIMES DAILY
COMMUNITY

## 2023-10-06 RX ORDER — PHENYLEPHRINE HCL IN 0.9% NACL 1 MG/10 ML
SYRINGE (ML) INTRAVENOUS AS NEEDED
Status: DISCONTINUED | OUTPATIENT
Start: 2023-10-06 | End: 2023-10-06

## 2023-10-06 RX ORDER — POLYETHYLENE GLYCOL 3350 17 G/17G
17 POWDER, FOR SOLUTION ORAL DAILY
Status: DISCONTINUED | OUTPATIENT
Start: 2023-10-06 | End: 2023-10-09

## 2023-10-06 RX ORDER — SUCCINYLCHOLINE CHLORIDE 20 MG/ML
INJECTION INTRAMUSCULAR; INTRAVENOUS AS NEEDED
Status: DISCONTINUED | OUTPATIENT
Start: 2023-10-06 | End: 2023-10-06

## 2023-10-06 RX ORDER — SODIUM CHLORIDE, SODIUM LACTATE, POTASSIUM CHLORIDE, CALCIUM CHLORIDE 600; 310; 30; 20 MG/100ML; MG/100ML; MG/100ML; MG/100ML
30 INJECTION, SOLUTION INTRAVENOUS CONTINUOUS
Status: DISCONTINUED | OUTPATIENT
Start: 2023-10-06 | End: 2023-10-07

## 2023-10-06 RX ORDER — NITROGLYCERIN 20 MG/100ML
0-200 INJECTION INTRAVENOUS CONTINUOUS
Status: DISCONTINUED | OUTPATIENT
Start: 2023-10-06 | End: 2023-10-08

## 2023-10-06 RX ORDER — PHENYLEPHRINE HYDROCHLORIDE 10 MG/ML
INJECTION INTRAVENOUS AS NEEDED
Status: DISCONTINUED | OUTPATIENT
Start: 2023-10-06 | End: 2023-10-06

## 2023-10-06 RX ORDER — TRANEXAMIC ACID 100 MG/ML
INJECTION, SOLUTION INTRAVENOUS AS NEEDED
Status: DISCONTINUED | OUTPATIENT
Start: 2023-10-06 | End: 2023-10-06

## 2023-10-06 RX ORDER — NAPROXEN SODIUM 220 MG/1
81 TABLET, FILM COATED ORAL ONCE
Status: COMPLETED | OUTPATIENT
Start: 2023-10-06 | End: 2023-10-06

## 2023-10-06 RX ORDER — CEFAZOLIN SODIUM 2 G/50ML
SOLUTION INTRAVENOUS AS NEEDED
Status: DISCONTINUED | OUTPATIENT
Start: 2023-10-06 | End: 2023-10-06

## 2023-10-06 RX ORDER — ISOSORBIDE MONONITRATE 30 MG/1
30 TABLET, EXTENDED RELEASE ORAL DAILY
Status: ON HOLD | COMMUNITY
Start: 2023-09-25 | End: 2023-10-12 | Stop reason: SDUPTHER

## 2023-10-06 RX ORDER — HEPARIN SODIUM 1000 [USP'U]/ML
INJECTION, SOLUTION INTRAVENOUS; SUBCUTANEOUS AS NEEDED
Status: DISCONTINUED | OUTPATIENT
Start: 2023-10-06 | End: 2023-10-06 | Stop reason: HOSPADM

## 2023-10-06 RX ORDER — PROPOFOL 10 MG/ML
INJECTION, EMULSION INTRAVENOUS AS NEEDED
Status: DISCONTINUED | OUTPATIENT
Start: 2023-10-06 | End: 2023-10-06

## 2023-10-06 RX ORDER — OXYCODONE HYDROCHLORIDE 5 MG/1
5 TABLET ORAL EVERY 4 HOURS PRN
Status: DISCONTINUED | OUTPATIENT
Start: 2023-10-06 | End: 2023-10-11

## 2023-10-06 RX ORDER — NITROGLYCERIN 40 MG/100ML
INJECTION INTRAVENOUS AS NEEDED
Status: DISCONTINUED | OUTPATIENT
Start: 2023-10-06 | End: 2023-10-06

## 2023-10-06 RX ORDER — CEFAZOLIN 1 G/1
INJECTION, POWDER, FOR SOLUTION INTRAVENOUS AS NEEDED
Status: DISCONTINUED | OUTPATIENT
Start: 2023-10-06 | End: 2023-10-06

## 2023-10-06 RX ORDER — PROTAMINE SULFATE 10 MG/ML
INJECTION, SOLUTION INTRAVENOUS AS NEEDED
Status: DISCONTINUED | OUTPATIENT
Start: 2023-10-06 | End: 2023-10-06

## 2023-10-06 RX ORDER — NOREPINEPHRINE BITARTRATE 0.03 MG/ML
INJECTION, SOLUTION INTRAVENOUS CONTINUOUS PRN
Status: DISCONTINUED | OUTPATIENT
Start: 2023-10-06 | End: 2023-10-06

## 2023-10-06 RX ORDER — ASPIRIN 300 MG/1
150 SUPPOSITORY RECTAL ONCE
Status: COMPLETED | OUTPATIENT
Start: 2023-10-06 | End: 2023-10-06

## 2023-10-06 RX ORDER — SODIUM CHLORIDE, SODIUM LACTATE, POTASSIUM CHLORIDE, CALCIUM CHLORIDE 600; 310; 30; 20 MG/100ML; MG/100ML; MG/100ML; MG/100ML
5 INJECTION, SOLUTION INTRAVENOUS CONTINUOUS
Status: DISCONTINUED | OUTPATIENT
Start: 2023-10-06 | End: 2023-10-09

## 2023-10-06 RX ORDER — ALBUMIN HUMAN 50 G/1000ML
25 SOLUTION INTRAVENOUS ONCE
Status: COMPLETED | OUTPATIENT
Start: 2023-10-06 | End: 2023-10-07

## 2023-10-06 RX ADMIN — PROPOFOL 30 MCG/KG/MIN: 10 INJECTION, EMULSION INTRAVENOUS at 19:18

## 2023-10-06 RX ADMIN — EPINEPHRINE 0.04 MCG/KG/MIN: 1 INJECTION INTRAMUSCULAR; INTRAVENOUS; SUBCUTANEOUS at 20:56

## 2023-10-06 RX ADMIN — LIDOCAINE HYDROCHLORIDE 100 MG: 10 INJECTION, SOLUTION INFILTRATION; PERINEURAL at 17:27

## 2023-10-06 RX ADMIN — HEPARIN SODIUM 7000 UNITS: 1000 INJECTION INTRAVENOUS; SUBCUTANEOUS at 16:43

## 2023-10-06 RX ADMIN — PROPOFOL 150 MG: 10 INJECTION, EMULSION INTRAVENOUS at 13:10

## 2023-10-06 RX ADMIN — SODIUM BICARBONATE 660 ML: 84 INJECTION, SOLUTION INTRAVENOUS at 13:23

## 2023-10-06 RX ADMIN — ALBUMIN HUMAN 25 G: 0.05 INJECTION, SOLUTION INTRAVENOUS at 23:38

## 2023-10-06 RX ADMIN — ISOFLURANE 3000 ML: 1 LIQUID RESPIRATORY (INHALATION) at 16:32

## 2023-10-06 RX ADMIN — SODIUM CHLORIDE, POTASSIUM CHLORIDE, SODIUM LACTATE AND CALCIUM CHLORIDE 5 ML/HR: 600; 310; 30; 20 INJECTION, SOLUTION INTRAVENOUS at 21:11

## 2023-10-06 RX ADMIN — ROCURONIUM BROMIDE 30 MG: 10 INJECTION, SOLUTION INTRAVENOUS at 18:05

## 2023-10-06 RX ADMIN — Medication 50 MCG: at 14:00

## 2023-10-06 RX ADMIN — ALBUMIN (HUMAN) 250 ML: 12.5 SOLUTION INTRAVENOUS at 19:23

## 2023-10-06 RX ADMIN — FENTANYL CITRATE 150 MCG: 50 INJECTION, SOLUTION INTRAMUSCULAR; INTRAVENOUS at 14:06

## 2023-10-06 RX ADMIN — CEFAZOLIN SODIUM 3 G: 2 SOLUTION INTRAVENOUS at 17:01

## 2023-10-06 RX ADMIN — Medication 100 MCG: at 17:44

## 2023-10-06 RX ADMIN — SODIUM BICARBONATE 50 MEQ: 84 INJECTION, SOLUTION INTRAVENOUS at 16:25

## 2023-10-06 RX ADMIN — NEOSTIGMINE METHYLSULFATE 2 MG: 0.5 INJECTION INTRAVENOUS at 22:00

## 2023-10-06 RX ADMIN — FENTANYL CITRATE 200 MCG: 50 INJECTION, SOLUTION INTRAMUSCULAR; INTRAVENOUS at 14:00

## 2023-10-06 RX ADMIN — INSULIN HUMAN 6 UNITS: 100 INJECTION, SOLUTION PARENTERAL at 17:12

## 2023-10-06 RX ADMIN — ALBUMIN (HUMAN) 250 ML: 12.5 SOLUTION INTRAVENOUS at 18:05

## 2023-10-06 RX ADMIN — INSULIN HUMAN 5 UNITS: 100 INJECTION, SOLUTION PARENTERAL at 16:46

## 2023-10-06 RX ADMIN — INSULIN HUMAN 3 UNITS: 100 INJECTION, SOLUTION PARENTERAL at 16:08

## 2023-10-06 RX ADMIN — Medication 16 MCG: at 18:35

## 2023-10-06 RX ADMIN — INSULIN HUMAN 3 UNITS: 100 INJECTION, SOLUTION PARENTERAL at 15:31

## 2023-10-06 RX ADMIN — SODIUM BICARBONATE 10 ML: 84 INJECTION, SOLUTION INTRAVENOUS at 17:11

## 2023-10-06 RX ADMIN — SODIUM CHLORIDE 3 MG/KG/HR: 9 INJECTION, SOLUTION INTRAVENOUS at 14:10

## 2023-10-06 RX ADMIN — FENTANYL CITRATE 100 MCG: 50 INJECTION, SOLUTION INTRAMUSCULAR; INTRAVENOUS at 13:10

## 2023-10-06 RX ADMIN — CEFAZOLIN SODIUM 3 G: 2 SOLUTION INTRAVENOUS at 13:04

## 2023-10-06 RX ADMIN — MIDAZOLAM 1 MG: 1 INJECTION INTRAMUSCULAR; INTRAVENOUS at 13:00

## 2023-10-06 RX ADMIN — ROCURONIUM BROMIDE 75 MG: 10 INJECTION, SOLUTION INTRAVENOUS at 13:20

## 2023-10-06 RX ADMIN — SODIUM CHLORIDE, POTASSIUM CHLORIDE, SODIUM LACTATE AND CALCIUM CHLORIDE 30 ML/HR: 600; 310; 30; 20 INJECTION, SOLUTION INTRAVENOUS at 21:12

## 2023-10-06 RX ADMIN — PROPOFOL 50 MCG/KG/MIN: 10 INJECTION, EMULSION INTRAVENOUS at 20:55

## 2023-10-06 RX ADMIN — SODIUM CHLORIDE, SODIUM GLUCONATE, SODIUM ACETATE, POTASSIUM CHLORIDE AND MAGNESIUM CHLORIDE: 526; 502; 368; 37; 30 INJECTION, SOLUTION INTRAVENOUS at 12:48

## 2023-10-06 RX ADMIN — PROTAMINE SULFATE 50 MG: 10 INJECTION, SOLUTION INTRAVENOUS at 18:31

## 2023-10-06 RX ADMIN — PROPOFOL 50 MCG/KG/MIN: 10 INJECTION, EMULSION INTRAVENOUS at 22:45

## 2023-10-06 RX ADMIN — ROCURONIUM BROMIDE 30 MG: 10 INJECTION, SOLUTION INTRAVENOUS at 15:18

## 2023-10-06 RX ADMIN — Medication 8 MCG: at 19:02

## 2023-10-06 RX ADMIN — INSULIN HUMAN 0 UNITS/HR: 1 INJECTION, SOLUTION INTRAVENOUS at 21:09

## 2023-10-06 RX ADMIN — TRANEXAMIC ACID 1800 MG: 100 INJECTION, SOLUTION INTRAVENOUS at 13:50

## 2023-10-06 RX ADMIN — ASPIRIN 81 MG CHEWABLE TABLET 81 MG: 81 TABLET CHEWABLE at 22:04

## 2023-10-06 RX ADMIN — SUCCINYLCHOLINE CHLORIDE 160 MG: 20 INJECTION, SOLUTION INTRAMUSCULAR; INTRAVENOUS at 13:10

## 2023-10-06 RX ADMIN — Medication 80 MCG: at 15:26

## 2023-10-06 RX ADMIN — Medication 8 MCG: at 16:25

## 2023-10-06 RX ADMIN — ALBUMIN (HUMAN) 250 ML: 12.5 SOLUTION INTRAVENOUS at 17:57

## 2023-10-06 RX ADMIN — NITROGLYCERIN 5 MCG/MIN: 20 INJECTION INTRAVENOUS at 17:50

## 2023-10-06 RX ADMIN — HEPARIN SODIUM 5000 UNITS: 1000 INJECTION INTRAVENOUS; SUBCUTANEOUS at 16:10

## 2023-10-06 RX ADMIN — ALBUMIN HUMAN 25 G: 0.05 INJECTION, SOLUTION INTRAVENOUS at 21:00

## 2023-10-06 RX ADMIN — Medication 16 MCG: at 18:11

## 2023-10-06 RX ADMIN — ROCURONIUM BROMIDE 50 MG: 10 INJECTION, SOLUTION INTRAVENOUS at 15:58

## 2023-10-06 RX ADMIN — Medication 0 MCG/KG/MIN: at 21:10

## 2023-10-06 RX ADMIN — ROCURONIUM BROMIDE 40 MG: 10 INJECTION, SOLUTION INTRAVENOUS at 14:07

## 2023-10-06 RX ADMIN — Medication 80 MCG: at 15:51

## 2023-10-06 RX ADMIN — CALCIUM CHLORIDE 1 G: 100 INJECTION INTRAVENOUS; INTRAVENTRICULAR at 17:31

## 2023-10-06 RX ADMIN — ROCURONIUM BROMIDE 5 MG: 10 INJECTION, SOLUTION INTRAVENOUS at 13:10

## 2023-10-06 RX ADMIN — SODIUM CHLORIDE, SODIUM GLUCONATE, SODIUM ACETATE, POTASSIUM CHLORIDE AND MAGNESIUM CHLORIDE: 30; 37; 368; 526; 502 INJECTION, SOLUTION INTRAVENOUS at 13:40

## 2023-10-06 RX ADMIN — ALBUMIN (HUMAN) 250 ML: 12.5 SOLUTION INTRAVENOUS at 19:00

## 2023-10-06 RX ADMIN — LIDOCAINE HYDROCHLORIDE 100 MG: 20 INJECTION, SOLUTION INFILTRATION; PERINEURAL at 13:10

## 2023-10-06 RX ADMIN — INSULIN HUMAN 3 UNITS: 100 INJECTION, SOLUTION PARENTERAL at 13:50

## 2023-10-06 RX ADMIN — Medication 100 MCG: at 18:26

## 2023-10-06 RX ADMIN — Medication 100 MCG: at 14:11

## 2023-10-06 RX ADMIN — ALBUMIN HUMAN 25 G: 50 SOLUTION INTRAVENOUS at 23:38

## 2023-10-06 RX ADMIN — Medication 80 MCG: at 15:43

## 2023-10-06 RX ADMIN — NITROGLYCERIN 5 MCG/MIN: 20 INJECTION INTRAVENOUS at 20:56

## 2023-10-06 RX ADMIN — FENTANYL CITRATE 50 MCG: 50 INJECTION, SOLUTION INTRAMUSCULAR; INTRAVENOUS at 12:55

## 2023-10-06 RX ADMIN — ISOFLURANE 3000 ML: 1 LIQUID RESPIRATORY (INHALATION) at 17:25

## 2023-10-06 RX ADMIN — MIDAZOLAM 1 MG: 1 INJECTION INTRAMUSCULAR; INTRAVENOUS at 12:55

## 2023-10-06 RX ADMIN — METHADONE HYDROCHLORIDE 30 MG: 10 TABLET ORAL at 12:48

## 2023-10-06 RX ADMIN — HEPARIN SODIUM 45000 UNITS: 1000 INJECTION INTRAVENOUS; SUBCUTANEOUS at 15:22

## 2023-10-06 RX ADMIN — SODIUM CHLORIDE, SODIUM GLUCONATE, SODIUM ACETATE, POTASSIUM CHLORIDE AND MAGNESIUM CHLORIDE: 526; 502; 368; 37; 30 INJECTION, SOLUTION INTRAVENOUS at 12:49

## 2023-10-06 RX ADMIN — PROPOFOL 30 MCG/KG/MIN: 10 INJECTION, EMULSION INTRAVENOUS at 19:09

## 2023-10-06 RX ADMIN — Medication 0.4 MCG/KG/MIN: at 14:22

## 2023-10-06 RX ADMIN — ALBUMIN HUMAN 25 G: 50 SOLUTION INTRAVENOUS at 21:00

## 2023-10-06 RX ADMIN — NOREPINEPHRINE BITARTRATE 0.04 MCG/KG/MIN: 0.03 INJECTION, SOLUTION INTRAVENOUS at 16:11

## 2023-10-06 RX ADMIN — PHENYLEPHRINE HYDROCHLORIDE 500 ML: 10 INJECTION INTRAVENOUS at 17:43

## 2023-10-06 RX ADMIN — Medication 0.04 MCG/KG/MIN: at 17:12

## 2023-10-06 RX ADMIN — SODIUM CHLORIDE, SODIUM GLUCONATE, SODIUM ACETATE, POTASSIUM CHLORIDE AND MAGNESIUM CHLORIDE: 30; 37; 368; 526; 502 INJECTION, SOLUTION INTRAVENOUS at 14:06

## 2023-10-06 RX ADMIN — FENTANYL CITRATE 200 MCG: 50 INJECTION, SOLUTION INTRAMUSCULAR; INTRAVENOUS at 14:09

## 2023-10-06 RX ADMIN — SODIUM BICARBONATE 50 ML/HR: 84 INJECTION, SOLUTION INTRAVENOUS at 19:50

## 2023-10-06 SDOH — HEALTH STABILITY: MENTAL HEALTH: CURRENT SMOKER: 0

## 2023-10-06 ASSESSMENT — ENCOUNTER SYMPTOMS
CONSTITUTIONAL NEGATIVE: 1
RESPIRATORY NEGATIVE: 1
CARDIOVASCULAR NEGATIVE: 1

## 2023-10-06 ASSESSMENT — PAIN SCALES - GENERAL: PAINLEVEL_OUTOF10: 0 - NO PAIN

## 2023-10-06 ASSESSMENT — COLUMBIA-SUICIDE SEVERITY RATING SCALE - C-SSRS
6. HAVE YOU EVER DONE ANYTHING, STARTED TO DO ANYTHING, OR PREPARED TO DO ANYTHING TO END YOUR LIFE?: NO
2. HAVE YOU ACTUALLY HAD ANY THOUGHTS OF KILLING YOURSELF?: NO

## 2023-10-06 ASSESSMENT — PAIN - FUNCTIONAL ASSESSMENT: PAIN_FUNCTIONAL_ASSESSMENT: 0-10

## 2023-10-06 NOTE — ANESTHESIA PROCEDURE NOTES
Arterial Line:    Date/Time: 10/6/2023 1:05 PM    Staffing  Performed: CRNA   Authorized by: Feroz Villagomez MD    Performed by: LISA Sheffield-CRNA    An arterial line was placed. Procedure performed using ultrasound guidance.in the OR for the following indication(s): continuous blood pressure monitoring.    A 20 gauge (size), 12 cm (length), Arrow (type) catheter was placed into the Right brachial artery, secured by Tegaderm,   Seldinger technique used.  Events:  patient tolerated procedure well with no complications.

## 2023-10-06 NOTE — ANESTHESIA PROCEDURE NOTES
Peripheral Block    Start time: 10/6/2023 7:25 PM  End time: 10/6/2023 7:30 PM  Reason for block: post-op pain management  Staffing  Performed: attending   Authorized by: Gracy Caro MD    Performed by: Alexandre Real DO  Preanesthetic Checklist  Completed: patient identified, IV checked, site marked, risks and benefits discussed, surgical consent, monitors and equipment checked, pre-op evaluation and timeout performed   Timeout performed at:   Peripheral Block  Patient position: laying flat  Prep: ChloraPrep  Patient monitoring: heart rate, cardiac monitor and continuous pulse ox  Block type: serratus anterior  Laterality: B/L  Injection technique: single-shot  Guidance: ultrasound guided  Local infiltration: ropivacaine  Infiltration strength: 0.5 %  Dose: 60 mL  Needle  Needle localization: ultrasound guidance  Assessment  Injection assessment: negative aspiration for heme, no paresthesia on injection, incremental injection and local visualized surrounding nerve on ultrasound  Paresthesia pain: none  Heart rate change: no  Slow fractionated injection: no  Additional Notes  30cc of 0.5% ropivicaine with 8mg decadron and 0.2cc of 1:1000 epi injected bilaterally.

## 2023-10-06 NOTE — BRIEF OP NOTE
Date: 10/6/2023  OR Location: [facility] OR    Name: Tim Kenney, : 1965, Age: 58 y.o., MRN: [id number], Sex: male    Diagnosis  Pre-op Diagnosis     * Atherosclerotic heart disease of native coronary artery without angina pectoris [I25.10] Post-op Diagnosis     * Atherosclerotic heart disease of native coronary artery without angina pectoris [I25.10]     Procedures  1.Median Sternotomy  2.CABGx4 (Lima-LAD, SVG-PDA, Radial Y'd off Merna-Diag, Merna-OM off smith of SVG)  3.ESVH R leg  4.ESR L arm  5.Styker Sternal plates closure  6.Pt part of the Leapps trial    Chest Tubes/Drains: 1 mediastinal CT, 1 L pleural CT, 1 R pleural CT        Pacing Wires: Ventricular PMW, NOT currently pacing     Sternotomy performed by: Luis Hernandez MD     Conduit Harvested by: [facility] L arm- Lm, ESVH R leg- Izaiah    Sternal Wires placed by: Luis Hernandez/ Lyndon (Timi plates placed by Lyndon)    Arm/Leg/Groin Closure/Cutdown performed by: L arm closed by Haney, R leg closed by Sierra    Cardio Pulmonary Bypass Time: 106 mins  Cross-clamp Time: 75 mins  Circulatory Arrest Time: N/A      Surgeons      * Behzad Portillo - Primary    Resident/Fellow/Other Assistant:  Luis Hernandez MD, Lm RNFA, Izaiah CSFA, Sierra SA-C, Lyndon RNFA    Procedure Summary  Anesthesia: General  ASA: III  Anesthesia Staff: Anesthesiologist: Gracy Caro MD; Feroz Villagomez MD  CRNA: LISA Sheffield-CRNA  Anesthesia Resident: Alexandre Real DO  Perfusionist: Giuseppe Gaines; Azra Carranza  Estimated Blood Loss: 250mL  Intra-op Medications:   Medication Name Total Dose   heparin 1,000 unit/mL injection 10,000 Units   propofol (Diprivan) infusion 345.6 mg              Anesthesia Record               Intraprocedure I/O Totals          Intake    electrolyte-A (Plasmalyte-A) 3000.00 mL    plasma-lyte-A IV solution 1000.00 mL    Norepinephrine Drip 0.00 mL    The total shown is the total volume documented since Anesthesia Start was filed.     Nitroglycerin Drip 0.00 mL    The total shown is the total volume documented since Anesthesia Start was filed.    Epinephrine Drip 0.00 mL    The total shown is the total volume documented since Anesthesia Start was filed.    Propofol Drip 0.00 mL    The total shown is the total volume documented since Anesthesia Start was filed.    Phenylephrine Drip 0.00 mL    The total shown is the total volume documented since Anesthesia Start was filed.    perfusion prime builder 660.00 mL    Cell Saver 599 mL    Total Intake 5259 mL       Output    Urine 1015 mL    Total Output 1015 mL       Net    Net Volume 4244 mL          Specimen: No specimens collected     Staff:   Circulator: Miguel Rosas RN; Trinity Carvalho RN  Relief Scrub: Radha Ignacio RN  Scrub Person: Ekaterina Houston RN; Rosy Martel; Radha Ignacio RN; Mervin Lozada          Findings: CAD    Complications:  None; patient tolerated the procedure well.     Disposition: ICU - intubated and hemodynamically stable.  Condition: stable  Specimens Collected: No specimens collected  Attending Attestation: I was present and scrubbed for the key portions of the procedure.    Behzad Portillo  Phone Number: [phone]

## 2023-10-06 NOTE — PROGRESS NOTES
Pharmacy Medication History Review    Tim Kenney is a 58 y.o. male admitted for Coronary artery disease of native artery of native heart with stable angina pectoris (CMS/[facility]). Pharmacy reviewed the patient's bygau-wp-uucrzhxli medications and allergies for accuracy.    The list below reflects the updated PTA list. Please review each medication in order reconciliation for additional clarification and justification.  Medications Prior to Admission   Medication Sig Dispense Refill Last Dose    isosorbide mononitrate ER (Imdur) 30 mg 24 hr tablet Take 1 tablet (30 mg) by mouth once daily.   10/6/2023    allopurinol (Zyloprim) 300 mg tablet Take 1 tablet by mouth daily 90 tablet 2 10/5/2023    aspirin 81 mg EC tablet Take 1 tablet (81 mg) by mouth once daily. 81 MG   10/6/2023    atorvastatin (Lipitor) 40 mg tablet Take 1 tablet (40 mg) by mouth once daily.   10/6/2023    gabapentin (Neurontin) 600 mg tablet Take 1 tablet (600 mg) by mouth 5 times a day. 150 tablet 11 10/6/2023    lisinopril 10 mg tablet TAKE 1 TABLET BY MOUTH EVERY DAY 30 tablet 11     lisinopril 2.5 mg tablet Take 1 tablet (2.5 mg) by mouth once daily.   10/6/2023    metoprolol succinate XL (Toprol-XL) 50 mg 24 hr tablet Take 1 tablet (50 mg) by mouth once daily.   10/6/2023    NovoLOG U-100 Insulin aspart 100 unit/mL injection USE VIA INSULIN PUMP, MAX DAILY DOSE 200 UNITS   10/6/2023    semaglutide (Rybelsus) 7 mg tablet Take 1 tablet (7 mg) by mouth once daily. As directed   Past Week    testosterone (Androgel) 50 mg/5 gram (1 %) gel Place 1 packet (50 mg) on the skin 2 times a day.   Past Week        The list below reflects the updated allergy list. Please review each documented allergy for additional clarification and justification.  Allergies  Reviewed by Miguel Rosas RN on 10/6/2023        Severity Reactions Comments    Iodinated Contrast Media Not Specified Hives IV Contrast Dye            Below are additional concerns with the  patient's PTA list.  Patient was good historian of medications/ Pharmacy - [facility]/ Chart Review/ [place] - gabapentin 600mg #150/30 filled 10/1/23    Jeannine Mandel, PharmD  Transitions of Care Pharmacist  Medication reconciliation complete  Please reach out via secure chat for questions, or if no response call [phone] or [facility]

## 2023-10-06 NOTE — ANESTHESIA PREPROCEDURE EVALUATION
Patient: Tim Kenney    Procedure Information       Date/Time: 10/06/23 1400    Procedure: CABGX LIMA, DINORAH, Radial    Location: [facility] OR 21 / [facility]    Surgeons: Behzad Portillo MD            Relevant Problems   Cardiovascular   (+) Coronary artery disease involving native coronary artery of native heart without angina pectoris   (+) Coronary artery disease of native artery of native heart with stable angina pectoris (CMS/HCC)   (+) Essential hypertension   (+) Mixed hyperlipidemia      Endocrine   (+) Class 1 obesity due to excess calories with serious comorbidity and body mass index (BMI) of 34.0 to 34.9 in adult   (+) Hyponatremia   (+) Type 2 diabetes mellitus without complication, without long-term current use of insulin (CMS/HCC)      /Renal   (+) Acute renal failure (CMS/HCC)   (+) Focal nodular hyperplasia of liver      Pulmonary   (+) LENNY (obstructive sleep apnea)      Hematology   (+) Anemia      Other   (+) Charcot's joint of right foot       Clinical information reviewed:   Tobacco  Allergies    Med Hx  Surg Hx   Fam Hx          NPO Detail:  NPO/Void Status  Date of Last Liquid: 10/05/23  Time of Last Liquid: 2100  Date of Last Solid: 10/05/23  Time of Last Solid: 2100         PHYSICAL EXAM    Anesthesia Plan    ASA 3     general     The patient is not a current smoker.  Patient did not smoke on day of procedure.    intravenous induction   Postoperative administration of opioids is intended.  Anesthetic plan and risks discussed with patient.  Use of blood products discussed with patient who.    Plan discussed with CRNA.    Plan general endotracheal anesthesia with large bore peripheral IV access, arterial line placement, central venous access, LORENA, and ASA standard monitors.  The possibility of blood product transfusion was also described in detail.  Risks, benefits, alternatives of this plan were described in detail to the patient, who indicated understanding and agreed  to proceed.    Risks, benefits, and alternatives to single shot anterior chest wall blocks for postoperative pain management were explained to the patient, who indicated understanding and agreed to proceed.

## 2023-10-06 NOTE — H&P
"History Of Present Illness  Tim Kenney is a 58 y.o. male presenting with cad. cabg     Past Medical History  Past Medical History:   Diagnosis Date    Angina pectoris (CMS/[facility])     C. difficile colitis     dx 7/30/23    Coronary artery disease     [facility] 7/25/23: % ISR + collat, 1st diag 75%, LCx mid 95%, RCA mid 50%, PDA distal 90%, LVEF 45%    Diabetes mellitus (CMS/[facility])     takes insulin and semaglutide/Rybelsus    Hyperlipidemia     Hypertension     Joint pain     ankle, Charcot foot    Old myocardial infarction     History of myocardial infarction    Sleep apnea     + CPAP       Surgical History  Past Surgical History:   Procedure Laterality Date    OTHER SURGICAL HISTORY  04/13/2022    No history of surgery        Social History  He reports that he has never smoked. He has never been exposed to tobacco smoke. He has never used smokeless tobacco. He reports that he does not drink alcohol and does not use drugs.    Family History  Family History   Problem Relation Name Age of Onset    Other (Cardiac Disorder) Mother      Breast cancer Mother      Cancer Father          Allergies  Iodinated contrast media    Review of Systems   Constitutional: Negative.    Respiratory: Negative.     Cardiovascular: Negative.         Physical Exam  Constitutional:       Appearance: Normal appearance.   Cardiovascular:      Rate and Rhythm: Normal rate and regular rhythm.   Pulmonary:      Effort: Pulmonary effort is normal.      Breath sounds: Normal breath sounds.   Abdominal:      General: Abdomen is flat. Bowel sounds are normal.          Last Recorded Vitals  Blood pressure 126/71, pulse 85, temperature 36.3 °C (97.3 °F), temperature source Tympanic, resp. rate 16, height 1.88 m (6' 2\"), weight 120 kg (264 lb 5.3 oz), SpO2 96 %.    Relevant Results        Triple vessel disease     Assessment/Plan   Principal Problem:    Coronary artery disease of native artery of native heart with stable angina pectoris " (CMS/[facility])      CABG x 4       I spent 30 minutes in the professional and overall care of this patient.      Behzad Portillo MD

## 2023-10-06 NOTE — OP NOTE
CABGX LIMA, DINORAH, Radial Operative Note     Date: 10/6/2023  OR Location: [facility] OR    Name: Tim Kenney, : 1965, Age: 58 y.o., MRN: [id number], Sex: male    Diagnosis  Pre-op Diagnosis     * Atherosclerotic heart disease of native coronary artery without angina pectoris [I25.10] Post-op Diagnosis     * Atherosclerotic heart disease of native coronary artery without angina pectoris [I25.10]     Procedures  1.  CABG x4, with bilateral mammary, LIMA to LAD, DINORAH as a free graft to obtuse marginal, radial to diagonal and saphenous vein graft to distal PDA.  2.  Endoscopic vein and radial harvest .  3.  The patient agreed to participate on our ongoing LeAAPs trial so he may or may not receive the left appendage clip.    Surgeons      * Behzad Portillo - Primary    Resident/Fellow/Other Assistant:  Tomasa perla    Procedure Summary  Anesthesia: General  ASA: III  Anesthesia Staff: Anesthesiologist: Gracy Caro MD; Feroz Villagomez MD  CRNA: LISA Sheffield-CRNA  Anesthesia Resident: Alexandre Real DO  Perfusionist: Giuseppe Gaines; Azra Carranza  Estimated Blood Loss: 300mL  Intra-op Medications:   Medication Name Total Dose   heparin 1,000 unit/mL injection 10,000 Units              Anesthesia Record               Intraprocedure I/O Totals          Intake    electrolyte-A (Plasmalyte-A) 3000.00 mL    plasma-lyte-A IV solution 1000.00 mL    Norepinephrine Drip 0.00 mL    The total shown is the total volume documented since Anesthesia Start was filed.    Nitroglycerin Drip 0.00 mL    The total shown is the total volume documented since Anesthesia Start was filed.    Epinephrine Drip 0.00 mL    The total shown is the total volume documented since Anesthesia Start was filed.    Phenylephrine Drip 0.00 mL    The total shown is the total volume documented since Anesthesia Start was filed.    perfusion prime builder 660.00 mL    Total Intake 4660 mL       Output     Urine 390 mL    Total Output 390 mL       Net    Net Volume 4270 mL          Specimen: No specimens collected     Staff:   Circulator: Miguel Rosas RN; Trinity Carvalho RN  Relief Scrub: Radha Ignacio RN  Scrub Person: Ekaterina Houston RN; Rosy Martel; Radha Ignacio RN         Drains and/or Catheters:   Urethral Catheter Non-latex;Temperature probe 14 Fr. (Active)   Collection Container Standard drainage bag 10/06/23 1605   Securement Method Securing device (Describe) 10/06/23 1605       Tourniquet Times:         Implants:  Implants       Type Name Action Serial No.      Heart Valve KIT, CANNULA JAVIER - [id number] Used, Not Implanted      Heart Valve KIT, CARDIOPLEGIA, VANGUARD HEAT EXCHANGER - [id number] Used, Not Implanted      Heart Valve KIT, CELL SAVER, W/COLLECTION SET, 225ML WASH SET - [id number] Used, Not Implanted      Heart Valve CANNULA, VENOUS, 2-STAGE, 32/40 ROUND - [id number] Used, Not Implanted      Cardiac Pacemaker LEAD, PACING, MYOCARDIAL, BIPOLAR, TEMPORARY - [id number] Used, Not Implanted               Findings:   1.There were no pericardial adhesions or effusions.   2. The ascending aorta was soft without evidence of atherosclerosis.   3. The heart was dilated and demonstrated 40 %  ventricular function.    4.The patient had severe diffuse coronary artery disease, however we were able to find locations on the LAD diagonals obtuse marginal and distal PDA that was suitable for grafting.  5.  The conduits were suitable for grafting.  6.  The flow of the graft after the protamine were acceptable.     Indications:   Tim Kenney is an 58 y.o. male who is having surgery for Atherosclerotic heart disease of native coronary artery without angina pectoris [I25.10]. The patient is a 58-year-old male with symptomatic coronary artery disease.  Coronary angiography demonstrated severe triple-vessel disease with  of the LAD..  Echocardiography demonstrated impaired left ventricular function and  no significant valvular heart disease.  The patient was referred for coronary artery bypass grafting.  Class I indication for coronary artery bypass surgery.    The patient was seen in the preoperative area. The risks, benefits, complications, treatment options, non-operative alternatives, expected recovery and outcomes were discussed with the patient. The possibilities of reaction to medication, pulmonary aspiration, injury to surrounding structures, bleeding, recurrent infection, the need for additional procedures, failure to diagnose a condition, and creating a complication requiring transfusion or operation were discussed with the patient. The patient concurred with the proposed plan, giving informed consent.  The site of surgery was properly noted/marked if necessary per policy. The patient has been actively warmed in preoperative area. Preoperative antibiotics  Venous thrombosis prophylaxis     Procedure Details:   A median sternotomy was performed.  The left and right internal thoracic arteries were dissected from the chest wall in a skeletonized manner and saphenous vein and radial were harvested endoscopic from the thigh.  The thymus was divided and the pericardium was opened.  The ascending aorta was palpated and it was without evidence of atherosclerosis.  The patient was heparinized.  The distal ascending aorta and right atrium were cannulated for cardiopulmonary bypass and ante grade and retrograde cardioplegia cannulas were placed.  The patient was placed on cardiopulmonary bypass, the ascending aorta was cross clamped, and the heart was arrested and protected with cold blood cardioplegia.  During the remainder of the cross-clamp time cold blood cardioplegia was given every 15 to 20 minutes.    The segment of the grafting was performed as follows:    1.  The first bypass was performed was a saphenous vein graft to the distal PDA.  This was done as an its anastomosis using 7-0 Prolene in a running  fashion.  2.  Bypass was performed with a free DINORAH to the obtuse marginal.  The obtuse marginal was a small vessel around 1.5 mm diffusely diseased.    3.  The radial artery was anastomosed as an end-to-side graft to the diagonal.  The diagonal was around 1.75 mm in diameter.  Disease at the site of anastomosis.  4.  The last study performed was the LIMA to LAD.  The LAD was a large vessel around a millimeters in diameter.  Using 7-0 Prolene we performed an end-to-side anastomosis.  5.  The proximal of the vein was anastomosed to the ascending aorta using single aortic cross-clamp technique 6-0 Prolene and a 4 oh punch.  The radial was anastomosed to the smith of the saphenous vein graft and finally proximally with a free DINORAH was anastomosed to the radial.  The heart and ascending aorta were de-aired and the aortic cross-clamp was removed.  When the patient's heart was completely recovered and thoroughly de-aired he was weaned from cardiopulmonary bypass without difficulty.  All cannulas were removed and the heparin effect was reversed with protamine.  Hemostasis was obtained, mediastinal and bilateral pleural chest tubes were placed, right ventricular pacemaker wires were placed on the diaphragmatic surface of the RV, and the wounds were closed in the standard fashion.  The patient tolerated the procedure well and was brought to the intensive care unit in stable condition.  To sponge counts, instrument counts, and needle counts were reported correct by the circulating nurse.  There were no specimens sent to pathology.  The drains included mediastinal and pleural chest tub.   The sternum was reinforced with sternal plates because we used bilateral mammary, the patient had diabetes and he is overweight.    Complications:  None; patient tolerated the procedure well.    Disposition: ICU - intubated and hemodynamically stable.  Condition: stable         Additional Details:     Attending Attestation:     Behzad Abdalla  Bandar  Phone Number: [phone]

## 2023-10-06 NOTE — ANESTHESIA PROCEDURE NOTES
Central Venous Line:    Date/Time: 10/6/2023 1:31 PM    A central venous line was placed in the ICU for the following indication(s): central venous access.  Staffing  Performed: CRNA   Authorized by: Feroz Villagomez MD    Performed by: LISA Sheffield-CRNA    Sterility preparation included the following: provider hand hygiene performed prior to central venous catheter insertion, all 5 sterile barriers used (gloves, gown, cap, mask, large sterile drape) during central venous catheter insertion, antiseptic used during central venous catheter insertion and skin prep agent completely dried prior to procedure.  Medical reason for not performing maximal sterile barrier technique: no  The patient was placed in Trendelenburg position.    Right internal jugular vein was prepped.    The site was prepped with Chlorhexidine.  Size: 9 Fr   Length: 15  Catheter type: introducer   Number of Lumens: triple lumen    This catheter was not an oximetric catheter.    During the procedure, the following specific steps were taken: target vein identified, needle advanced into vein and blood aspirated and guidewire advanced into vein.  Seldinger technique used.  Procedure performed using ultrasound guidance.  Sterile gel and probe cover used in ultrasound-guided central venous catheter insertion.    Intravenous verification was obtained by ultrasound and transducer.      Post insertion care included: all ports aspirated, all ports flushed easily, guidewire removed intact, Biopatch applied, line sutured in place and dressing applied.    During the procedure the patient experienced: patient tolerated procedure well with no complications.           images stored in chart

## 2023-10-06 NOTE — ANESTHESIA PROCEDURE NOTES
Airway  Date/Time: 10/6/2023 1:14 PM  Urgency: elective    Airway not difficult    Staffing  Performed: CRNA   Authorized by: Feroz Villagomez MD    Performed by: LISA Sheffield-PATRICIA  Patient location during procedure: OR    Indications and Patient Condition  Indications for airway management: anesthesia  Spontaneous Ventilation: absent  Sedation level: deep  Preoxygenated: yes  Patient position: sniffing  MILS not maintained throughout  Mask difficulty assessment: 1 - vent by mask  No planned trial extubation    Final Airway Details  Final airway type: endotracheal airway      Successful airway: ETT  Cuffed: yes   Successful intubation technique: direct laryngoscopy  Endotracheal tube insertion site: oral  Blade: Francheska  Blade size: #4  ETT size (mm): 7.5  Cormack-Lehane Classification: grade I - full view of glottis  Placement verified by: capnometry   Measured from: lips  ETT to lips (cm): 24  Number of attempts at approach: 1  Ventilation between attempts: none

## 2023-10-07 LAB
ALBUMIN SERPL BCP-MCNC: 4.4 G/DL (ref 3.4–5)
ALBUMIN SERPL BCP-MCNC: 4.5 G/DL (ref 3.4–5)
ANION GAP BLDA CALCULATED.4IONS-SCNC: 13 MMO/L (ref 10–25)
ANION GAP BLDA CALCULATED.4IONS-SCNC: 16 MMO/L (ref 10–25)
ANION GAP BLDA CALCULATED.4IONS-SCNC: 18 MMO/L (ref 10–25)
ANION GAP BLDA CALCULATED.4IONS-SCNC: 21 MMO/L (ref 10–25)
ANION GAP BLDA CALCULATED.4IONS-SCNC: 22 MMO/L (ref 10–25)
ANION GAP BLDA CALCULATED.4IONS-SCNC: 23 MMO/L (ref 10–25)
ANION GAP BLDA CALCULATED.4IONS-SCNC: 26 MMO/L (ref 10–25)
ANION GAP BLDA CALCULATED.4IONS-SCNC: 26 MMO/L (ref 10–25)
ANION GAP BLDA CALCULATED.4IONS-SCNC: 8 MMO/L (ref 10–25)
ANION GAP SERPL CALC-SCNC: 17 MMOL/L (ref 10–20)
ANION GAP SERPL CALC-SCNC: 25 MMOL/L (ref 10–20)
APTT PPP: 30 SECONDS (ref 27–38)
BASE EXCESS BLDA CALC-SCNC: -1.5 MMOL/L (ref -2–3)
BASE EXCESS BLDA CALC-SCNC: -11.5 MMOL/L (ref -2–3)
BASE EXCESS BLDA CALC-SCNC: -11.8 MMOL/L (ref -2–3)
BASE EXCESS BLDA CALC-SCNC: -12.9 MMOL/L (ref -2–3)
BASE EXCESS BLDA CALC-SCNC: -3.9 MMOL/L (ref -2–3)
BASE EXCESS BLDA CALC-SCNC: -6.1 MMOL/L (ref -2–3)
BASE EXCESS BLDA CALC-SCNC: -7.3 MMOL/L (ref -2–3)
BASE EXCESS BLDA CALC-SCNC: -9.8 MMOL/L (ref -2–3)
BASE EXCESS BLDA CALC-SCNC: 0.7 MMOL/L (ref -2–3)
BODY TEMPERATURE: 37 DEGREES CELSIUS
BUN SERPL-MCNC: 16 MG/DL (ref 6–23)
BUN SERPL-MCNC: 17 MG/DL (ref 6–23)
CA-I BLD-SCNC: 1.04 MMOL/L (ref 1.1–1.33)
CA-I BLD-SCNC: 1.11 MMOL/L (ref 1.1–1.33)
CA-I BLDA-SCNC: 1.01 MMOL/L (ref 1.1–1.33)
CA-I BLDA-SCNC: 1.03 MMOL/L (ref 1.1–1.33)
CA-I BLDA-SCNC: 1.05 MMOL/L (ref 1.1–1.33)
CA-I BLDA-SCNC: 1.07 MMOL/L (ref 1.1–1.33)
CA-I BLDA-SCNC: 1.09 MMOL/L (ref 1.1–1.33)
CA-I BLDA-SCNC: 1.11 MMOL/L (ref 1.1–1.33)
CA-I BLDA-SCNC: 1.11 MMOL/L (ref 1.1–1.33)
CA-I BLDA-SCNC: 1.12 MMOL/L (ref 1.1–1.33)
CA-I BLDA-SCNC: 1.12 MMOL/L (ref 1.1–1.33)
CALCIUM SERPL-MCNC: 8.5 MG/DL (ref 8.6–10.6)
CALCIUM SERPL-MCNC: 8.5 MG/DL (ref 8.6–10.6)
CHLORIDE BLDA-SCNC: 103 MMOL/L (ref 98–107)
CHLORIDE BLDA-SCNC: 104 MMOL/L (ref 98–107)
CHLORIDE BLDA-SCNC: 105 MMOL/L (ref 98–107)
CHLORIDE BLDA-SCNC: 106 MMOL/L (ref 98–107)
CHLORIDE BLDA-SCNC: 106 MMOL/L (ref 98–107)
CHLORIDE BLDA-SCNC: 107 MMOL/L (ref 98–107)
CHLORIDE BLDA-SCNC: 108 MMOL/L (ref 98–107)
CHLORIDE SERPL-SCNC: 101 MMOL/L (ref 98–107)
CHLORIDE SERPL-SCNC: 105 MMOL/L (ref 98–107)
CO2 SERPL-SCNC: 16 MMOL/L (ref 21–32)
CO2 SERPL-SCNC: 23 MMOL/L (ref 21–32)
CREAT SERPL-MCNC: 0.94 MG/DL (ref 0.5–1.3)
CREAT SERPL-MCNC: 1.14 MG/DL (ref 0.5–1.3)
ERYTHROCYTE [DISTWIDTH] IN BLOOD BY AUTOMATED COUNT: 13.2 % (ref 11.5–14.5)
ERYTHROCYTE [DISTWIDTH] IN BLOOD BY AUTOMATED COUNT: 13.4 % (ref 11.5–14.5)
FIBRINOGEN PPP-MCNC: 239 MG/DL (ref 200–400)
GFR SERPL CREATININE-BSD FRML MDRD: 75 ML/MIN/1.73M*2
GFR SERPL CREATININE-BSD FRML MDRD: >90 ML/MIN/1.73M*2
GLUCOSE BLD MANUAL STRIP-MCNC: 116 MG/DL (ref 74–99)
GLUCOSE BLD MANUAL STRIP-MCNC: 129 MG/DL (ref 74–99)
GLUCOSE BLD MANUAL STRIP-MCNC: 133 MG/DL (ref 74–99)
GLUCOSE BLD MANUAL STRIP-MCNC: 141 MG/DL (ref 74–99)
GLUCOSE BLD MANUAL STRIP-MCNC: 144 MG/DL (ref 74–99)
GLUCOSE BLD MANUAL STRIP-MCNC: 149 MG/DL (ref 74–99)
GLUCOSE BLD MANUAL STRIP-MCNC: 153 MG/DL (ref 74–99)
GLUCOSE BLD MANUAL STRIP-MCNC: 169 MG/DL (ref 74–99)
GLUCOSE BLD MANUAL STRIP-MCNC: 183 MG/DL (ref 74–99)
GLUCOSE BLD MANUAL STRIP-MCNC: 323 MG/DL (ref 74–99)
GLUCOSE BLDA-MCNC: 138 MG/DL (ref 74–99)
GLUCOSE BLDA-MCNC: 174 MG/DL (ref 74–99)
GLUCOSE BLDA-MCNC: 218 MG/DL (ref 74–99)
GLUCOSE BLDA-MCNC: 249 MG/DL (ref 74–99)
GLUCOSE BLDA-MCNC: 272 MG/DL (ref 74–99)
GLUCOSE BLDA-MCNC: 329 MG/DL (ref 74–99)
GLUCOSE BLDA-MCNC: 365 MG/DL (ref 74–99)
GLUCOSE BLDA-MCNC: 372 MG/DL (ref 74–99)
GLUCOSE BLDA-MCNC: 405 MG/DL (ref 74–99)
GLUCOSE SERPL-MCNC: 128 MG/DL (ref 74–99)
GLUCOSE SERPL-MCNC: 374 MG/DL (ref 74–99)
HCO3 BLDA-SCNC: 13.3 MMOL/L (ref 22–26)
HCO3 BLDA-SCNC: 13.9 MMOL/L (ref 22–26)
HCO3 BLDA-SCNC: 14.6 MMOL/L (ref 22–26)
HCO3 BLDA-SCNC: 15.7 MMOL/L (ref 22–26)
HCO3 BLDA-SCNC: 17.9 MMOL/L (ref 22–26)
HCO3 BLDA-SCNC: 18.3 MMOL/L (ref 22–26)
HCO3 BLDA-SCNC: 20.7 MMOL/L (ref 22–26)
HCO3 BLDA-SCNC: 22.7 MMOL/L (ref 22–26)
HCO3 BLDA-SCNC: 25.4 MMOL/L (ref 22–26)
HCT VFR BLD AUTO: 25.2 % (ref 41–52)
HCT VFR BLD AUTO: 27.7 % (ref 41–52)
HCT VFR BLD EST: 25 % (ref 41–52)
HCT VFR BLD EST: 26 % (ref 41–52)
HCT VFR BLD EST: 27 % (ref 41–52)
HCT VFR BLD EST: 29 % (ref 41–52)
HGB BLD-MCNC: 8.6 G/DL (ref 13.5–17.5)
HGB BLD-MCNC: 9.3 G/DL (ref 13.5–17.5)
HGB BLDA-MCNC: 8.4 G/DL (ref 13.5–17.5)
HGB BLDA-MCNC: 8.7 G/DL (ref 13.5–17.5)
HGB BLDA-MCNC: 8.7 G/DL (ref 13.5–17.5)
HGB BLDA-MCNC: 8.8 G/DL (ref 13.5–17.5)
HGB BLDA-MCNC: 8.8 G/DL (ref 13.5–17.5)
HGB BLDA-MCNC: 9 G/DL (ref 13.5–17.5)
HGB BLDA-MCNC: 9 G/DL (ref 13.5–17.5)
HGB BLDA-MCNC: 9.1 G/DL (ref 13.5–17.5)
HGB BLDA-MCNC: 9.5 G/DL (ref 13.5–17.5)
INHALED O2 CONCENTRATION: 40 %
INR PPP: 1.2 (ref 0.9–1.1)
LACTATE BLDA-SCNC: 1.8 MMOL/L (ref 0.4–2)
LACTATE BLDA-SCNC: 10.2 MMOL/L (ref 0.4–2)
LACTATE BLDA-SCNC: 10.2 MMOL/L (ref 0.4–2)
LACTATE BLDA-SCNC: 11.2 MMOL/L (ref 0.4–2)
LACTATE BLDA-SCNC: 3.4 MMOL/L (ref 0.4–2)
LACTATE BLDA-SCNC: 4.9 MMOL/L (ref 0.4–2)
LACTATE BLDA-SCNC: 6.9 MMOL/L (ref 0.4–2)
LACTATE BLDA-SCNC: 7 MMOL/L (ref 0.4–2)
LACTATE BLDA-SCNC: 8.9 MMOL/L (ref 0.4–2)
MAGNESIUM SERPL-MCNC: 2.02 MG/DL (ref 1.6–2.4)
MAGNESIUM SERPL-MCNC: 2.14 MG/DL (ref 1.6–2.4)
MCH RBC QN AUTO: 29.8 PG (ref 26–34)
MCH RBC QN AUTO: 30.3 PG (ref 26–34)
MCHC RBC AUTO-ENTMCNC: 33.6 G/DL (ref 32–36)
MCHC RBC AUTO-ENTMCNC: 34.1 G/DL (ref 32–36)
MCV RBC AUTO: 87 FL (ref 80–100)
MCV RBC AUTO: 90 FL (ref 80–100)
NRBC BLD-RTO: 0 /100 WBCS (ref 0–0)
NRBC BLD-RTO: 0 /100 WBCS (ref 0–0)
OXYHGB MFR BLDA: 96 % (ref 94–98)
OXYHGB MFR BLDA: 96.5 % (ref 94–98)
OXYHGB MFR BLDA: 96.8 % (ref 94–98)
OXYHGB MFR BLDA: 96.8 % (ref 94–98)
OXYHGB MFR BLDA: 96.9 % (ref 94–98)
OXYHGB MFR BLDA: 96.9 % (ref 94–98)
OXYHGB MFR BLDA: 97 % (ref 94–98)
OXYHGB MFR BLDA: 97.5 % (ref 94–98)
OXYHGB MFR BLDA: 97.5 % (ref 94–98)
PCO2 BLDA: 29 MM HG (ref 38–42)
PCO2 BLDA: 31 MM HG (ref 38–42)
PCO2 BLDA: 31 MM HG (ref 38–42)
PCO2 BLDA: 32 MM HG (ref 38–42)
PCO2 BLDA: 34 MM HG (ref 38–42)
PCO2 BLDA: 34 MM HG (ref 38–42)
PCO2 BLDA: 35 MM HG (ref 38–42)
PCO2 BLDA: 35 MM HG (ref 38–42)
PCO2 BLDA: 40 MM HG (ref 38–42)
PH BLDA: 7.24 PH (ref 7.38–7.42)
PH BLDA: 7.24 PH (ref 7.38–7.42)
PH BLDA: 7.29 PH (ref 7.38–7.42)
PH BLDA: 7.3 PH (ref 7.38–7.42)
PH BLDA: 7.33 PH (ref 7.38–7.42)
PH BLDA: 7.38 PH (ref 7.38–7.42)
PH BLDA: 7.38 PH (ref 7.38–7.42)
PH BLDA: 7.41 PH (ref 7.38–7.42)
PH BLDA: 7.42 PH (ref 7.38–7.42)
PHOSPHATE SERPL-MCNC: 2.2 MG/DL (ref 2.5–4.9)
PHOSPHATE SERPL-MCNC: 2.5 MG/DL (ref 2.5–4.9)
PLATELET # BLD AUTO: 100 X10*3/UL (ref 150–450)
PLATELET # BLD AUTO: 141 X10*3/UL (ref 150–450)
PMV BLD AUTO: 11.3 FL (ref 7.5–11.5)
PMV BLD AUTO: 11.3 FL (ref 7.5–11.5)
PO2 BLDA: 101 MM HG (ref 85–95)
PO2 BLDA: 104 MM HG (ref 85–95)
PO2 BLDA: 107 MM HG (ref 85–95)
PO2 BLDA: 108 MM HG (ref 85–95)
PO2 BLDA: 110 MM HG (ref 85–95)
PO2 BLDA: 115 MM HG (ref 85–95)
PO2 BLDA: 123 MM HG (ref 85–95)
PO2 BLDA: 86 MM HG (ref 85–95)
PO2 BLDA: 93 MM HG (ref 85–95)
POTASSIUM BLDA-SCNC: 3.9 MMOL/L (ref 3.5–5.3)
POTASSIUM BLDA-SCNC: 4.1 MMOL/L (ref 3.5–5.3)
POTASSIUM BLDA-SCNC: 4.2 MMOL/L (ref 3.5–5.3)
POTASSIUM BLDA-SCNC: 4.3 MMOL/L (ref 3.5–5.3)
POTASSIUM BLDA-SCNC: 4.3 MMOL/L (ref 3.5–5.3)
POTASSIUM BLDA-SCNC: 4.5 MMOL/L (ref 3.5–5.3)
POTASSIUM BLDA-SCNC: 4.7 MMOL/L (ref 3.5–5.3)
POTASSIUM SERPL-SCNC: 4.1 MMOL/L (ref 3.5–5.3)
POTASSIUM SERPL-SCNC: 4.4 MMOL/L (ref 3.5–5.3)
PROTHROMBIN TIME: 13 SECONDS (ref 9.8–12.8)
RBC # BLD AUTO: 2.89 X10*6/UL (ref 4.5–5.9)
RBC # BLD AUTO: 3.07 X10*6/UL (ref 4.5–5.9)
SAO2 % BLDA: 98 % (ref 94–100)
SAO2 % BLDA: 99 % (ref 94–100)
SODIUM BLDA-SCNC: 137 MMOL/L (ref 136–145)
SODIUM BLDA-SCNC: 137 MMOL/L (ref 136–145)
SODIUM BLDA-SCNC: 138 MMOL/L (ref 136–145)
SODIUM BLDA-SCNC: 139 MMOL/L (ref 136–145)
SODIUM BLDA-SCNC: 139 MMOL/L (ref 136–145)
SODIUM BLDA-SCNC: 140 MMOL/L (ref 136–145)
SODIUM SERPL-SCNC: 138 MMOL/L (ref 136–145)
SODIUM SERPL-SCNC: 141 MMOL/L (ref 136–145)
WBC # BLD AUTO: 14.7 X10*3/UL (ref 4.4–11.3)
WBC # BLD AUTO: 21.4 X10*3/UL (ref 4.4–11.3)

## 2023-10-07 PROCEDURE — 2500000004 HC RX 250 GENERAL PHARMACY W/ HCPCS (ALT 636 FOR OP/ED)

## 2023-10-07 PROCEDURE — 85384 FIBRINOGEN ACTIVITY: CPT | Performed by: STUDENT IN AN ORGANIZED HEALTH CARE EDUCATION/TRAINING PROGRAM

## 2023-10-07 PROCEDURE — 85027 COMPLETE CBC AUTOMATED: CPT | Performed by: STUDENT IN AN ORGANIZED HEALTH CARE EDUCATION/TRAINING PROGRAM

## 2023-10-07 PROCEDURE — 82947 ASSAY GLUCOSE BLOOD QUANT: CPT

## 2023-10-07 PROCEDURE — 2500000001 HC RX 250 WO HCPCS SELF ADMINISTERED DRUGS (ALT 637 FOR MEDICARE OP)

## 2023-10-07 PROCEDURE — 96372 THER/PROPH/DIAG INJ SC/IM: CPT | Performed by: NURSE PRACTITIONER

## 2023-10-07 PROCEDURE — P9045 ALBUMIN (HUMAN), 5%, 250 ML: HCPCS | Mod: JZ

## 2023-10-07 PROCEDURE — 2500000002 HC RX 250 W HCPCS SELF ADMINISTERED DRUGS (ALT 637 FOR MEDICARE OP, ALT 636 FOR OP/ED)

## 2023-10-07 PROCEDURE — 82330 ASSAY OF CALCIUM: CPT

## 2023-10-07 PROCEDURE — 82805 BLOOD GASES W/O2 SATURATION: CPT

## 2023-10-07 PROCEDURE — P9045 ALBUMIN (HUMAN), 5%, 250 ML: HCPCS | Mod: JZ | Performed by: STUDENT IN AN ORGANIZED HEALTH CARE EDUCATION/TRAINING PROGRAM

## 2023-10-07 PROCEDURE — 84295 ASSAY OF SERUM SODIUM: CPT | Performed by: STUDENT IN AN ORGANIZED HEALTH CARE EDUCATION/TRAINING PROGRAM

## 2023-10-07 PROCEDURE — 82330 ASSAY OF CALCIUM: CPT | Performed by: STUDENT IN AN ORGANIZED HEALTH CARE EDUCATION/TRAINING PROGRAM

## 2023-10-07 PROCEDURE — 85610 PROTHROMBIN TIME: CPT | Performed by: STUDENT IN AN ORGANIZED HEALTH CARE EDUCATION/TRAINING PROGRAM

## 2023-10-07 PROCEDURE — 2580000001 HC RX 258 IV SOLUTIONS: Performed by: STUDENT IN AN ORGANIZED HEALTH CARE EDUCATION/TRAINING PROGRAM

## 2023-10-07 PROCEDURE — 2580000001 HC RX 258 IV SOLUTIONS: Performed by: NURSE PRACTITIONER

## 2023-10-07 PROCEDURE — 99291 CRITICAL CARE FIRST HOUR: CPT

## 2023-10-07 PROCEDURE — 94003 VENT MGMT INPAT SUBQ DAY: CPT

## 2023-10-07 PROCEDURE — C9113 INJ PANTOPRAZOLE SODIUM, VIA: HCPCS | Performed by: STUDENT IN AN ORGANIZED HEALTH CARE EDUCATION/TRAINING PROGRAM

## 2023-10-07 PROCEDURE — 2500000001 HC RX 250 WO HCPCS SELF ADMINISTERED DRUGS (ALT 637 FOR MEDICARE OP): Performed by: STUDENT IN AN ORGANIZED HEALTH CARE EDUCATION/TRAINING PROGRAM

## 2023-10-07 PROCEDURE — 83735 ASSAY OF MAGNESIUM: CPT | Performed by: STUDENT IN AN ORGANIZED HEALTH CARE EDUCATION/TRAINING PROGRAM

## 2023-10-07 PROCEDURE — 37799 UNLISTED PX VASCULAR SURGERY: CPT | Performed by: STUDENT IN AN ORGANIZED HEALTH CARE EDUCATION/TRAINING PROGRAM

## 2023-10-07 PROCEDURE — 2500000005 HC RX 250 GENERAL PHARMACY W/O HCPCS: Performed by: STUDENT IN AN ORGANIZED HEALTH CARE EDUCATION/TRAINING PROGRAM

## 2023-10-07 PROCEDURE — 2500000004 HC RX 250 GENERAL PHARMACY W/ HCPCS (ALT 636 FOR OP/ED): Performed by: STUDENT IN AN ORGANIZED HEALTH CARE EDUCATION/TRAINING PROGRAM

## 2023-10-07 PROCEDURE — 2500000004 HC RX 250 GENERAL PHARMACY W/ HCPCS (ALT 636 FOR OP/ED): Performed by: NURSE PRACTITIONER

## 2023-10-07 PROCEDURE — 2020000001 HC ICU ROOM DAILY

## 2023-10-07 PROCEDURE — 96372 THER/PROPH/DIAG INJ SC/IM: CPT

## 2023-10-07 PROCEDURE — 99255 IP/OBS CONSLTJ NEW/EST HI 80: CPT

## 2023-10-07 RX ORDER — ATORVASTATIN CALCIUM 80 MG/1
80 TABLET, FILM COATED ORAL DAILY
Status: DISCONTINUED | OUTPATIENT
Start: 2023-10-07 | End: 2023-10-12 | Stop reason: HOSPADM

## 2023-10-07 RX ORDER — DEXTROSE 50 % IN WATER (D50W) INTRAVENOUS SYRINGE
25
Status: DISCONTINUED | OUTPATIENT
Start: 2023-10-07 | End: 2023-10-09

## 2023-10-07 RX ORDER — NEOSTIGMINE METHYLSULFATE 1 MG/ML
INJECTION, SOLUTION INTRAVENOUS
Status: COMPLETED
Start: 2023-10-07 | End: 2023-10-07

## 2023-10-07 RX ORDER — INSULIN GLARGINE 100 [IU]/ML
40 INJECTION, SOLUTION SUBCUTANEOUS EVERY 24 HOURS
Status: DISCONTINUED | OUTPATIENT
Start: 2023-10-07 | End: 2023-10-08

## 2023-10-07 RX ORDER — ALBUMIN HUMAN 50 G/1000ML
12.5 SOLUTION INTRAVENOUS ONCE
Status: COMPLETED | OUTPATIENT
Start: 2023-10-07 | End: 2023-10-07

## 2023-10-07 RX ORDER — HEPARIN SODIUM 5000 [USP'U]/ML
5000 INJECTION, SOLUTION INTRAVENOUS; SUBCUTANEOUS EVERY 8 HOURS
Status: DISCONTINUED | OUTPATIENT
Start: 2023-10-07 | End: 2023-10-12 | Stop reason: HOSPADM

## 2023-10-07 RX ORDER — ALBUMIN HUMAN 50 G/1000ML
SOLUTION INTRAVENOUS
Status: COMPLETED
Start: 2023-10-07 | End: 2023-10-07

## 2023-10-07 RX ORDER — NAPROXEN SODIUM 220 MG/1
81 TABLET, FILM COATED ORAL DAILY
Status: DISCONTINUED | OUTPATIENT
Start: 2023-10-07 | End: 2023-10-09

## 2023-10-07 RX ORDER — INSULIN GLARGINE 100 [IU]/ML
50 INJECTION, SOLUTION SUBCUTANEOUS EVERY 24 HOURS
Status: DISCONTINUED | OUTPATIENT
Start: 2023-10-07 | End: 2023-10-07

## 2023-10-07 RX ORDER — ACETAMINOPHEN 325 MG/1
650 TABLET ORAL EVERY 6 HOURS
Status: DISCONTINUED | OUTPATIENT
Start: 2023-10-07 | End: 2023-10-09

## 2023-10-07 RX ORDER — DEXTROSE MONOHYDRATE 100 MG/ML
0.3 INJECTION, SOLUTION INTRAVENOUS ONCE AS NEEDED
Status: DISCONTINUED | OUTPATIENT
Start: 2023-10-07 | End: 2023-10-09

## 2023-10-07 RX ADMIN — OXYCODONE HYDROCHLORIDE 5 MG: 5 TABLET ORAL at 15:37

## 2023-10-07 RX ADMIN — PROPOFOL 30 MCG/KG/MIN: 10 INJECTION, EMULSION INTRAVENOUS at 08:17

## 2023-10-07 RX ADMIN — DOCUSATE SODIUM 100 MG: 100 CAPSULE, LIQUID FILLED ORAL at 08:23

## 2023-10-07 RX ADMIN — ALBUMIN HUMAN 12.5 G: 0.05 INJECTION, SOLUTION INTRAVENOUS at 02:05

## 2023-10-07 RX ADMIN — CEFAZOLIN 3 G: 10 INJECTION, POWDER, FOR SOLUTION INTRAVENOUS at 20:41

## 2023-10-07 RX ADMIN — SODIUM CHLORIDE, POTASSIUM CHLORIDE, SODIUM LACTATE AND CALCIUM CHLORIDE 250 ML: 600; 310; 30; 20 INJECTION, SOLUTION INTRAVENOUS at 04:02

## 2023-10-07 RX ADMIN — PROPOFOL 50 MCG/KG/MIN: 10 INJECTION, EMULSION INTRAVENOUS at 02:06

## 2023-10-07 RX ADMIN — HYDROMORPHONE HYDROCHLORIDE 0.2 MG: 1 INJECTION, SOLUTION INTRAMUSCULAR; INTRAVENOUS; SUBCUTANEOUS at 11:21

## 2023-10-07 RX ADMIN — HEPARIN SODIUM 5000 UNITS: 5000 INJECTION INTRAVENOUS; SUBCUTANEOUS at 10:24

## 2023-10-07 RX ADMIN — ALBUMIN HUMAN 12.5 G: 0.05 INJECTION, SOLUTION INTRAVENOUS at 04:03

## 2023-10-07 RX ADMIN — PANTOPRAZOLE SODIUM 40 MG: 40 INJECTION, POWDER, FOR SOLUTION INTRAVENOUS at 08:20

## 2023-10-07 RX ADMIN — ASPIRIN 81 MG CHEWABLE TABLET 81 MG: 81 TABLET CHEWABLE at 14:04

## 2023-10-07 RX ADMIN — SODIUM CHLORIDE, POTASSIUM CHLORIDE, SODIUM LACTATE AND CALCIUM CHLORIDE 250 ML: 600; 310; 30; 20 INJECTION, SOLUTION INTRAVENOUS at 06:51

## 2023-10-07 RX ADMIN — POTASSIUM CHLORIDE 40 MEQ: 29.8 INJECTION, SOLUTION INTRAVENOUS at 04:20

## 2023-10-07 RX ADMIN — PROPOFOL 50 MCG/KG/MIN: 10 INJECTION, EMULSION INTRAVENOUS at 04:54

## 2023-10-07 RX ADMIN — ALBUMIN HUMAN 12.5 G: 50 SOLUTION INTRAVENOUS at 02:05

## 2023-10-07 RX ADMIN — NEOSTIGMINE METHYLSULFATE 10 MG: 1 INJECTION INTRAVENOUS at 01:06

## 2023-10-07 RX ADMIN — SODIUM PHOSPHATE, MONOBASIC, MONOHYDRATE AND SODIUM PHOSPHATE, DIBASIC, ANHYDROUS 15 MMOL: 142; 276 INJECTION, SOLUTION INTRAVENOUS at 08:18

## 2023-10-07 RX ADMIN — SODIUM CHLORIDE, POTASSIUM CHLORIDE, SODIUM LACTATE AND CALCIUM CHLORIDE 500 ML: 600; 310; 30; 20 INJECTION, SOLUTION INTRAVENOUS at 05:13

## 2023-10-07 RX ADMIN — OXYCODONE HYDROCHLORIDE 5 MG: 5 TABLET ORAL at 20:42

## 2023-10-07 RX ADMIN — INSULIN GLARGINE 40 UNITS: 100 INJECTION, SOLUTION SUBCUTANEOUS at 14:06

## 2023-10-07 RX ADMIN — ATORVASTATIN CALCIUM 80 MG: 80 TABLET, FILM COATED ORAL at 14:03

## 2023-10-07 RX ADMIN — HYDROMORPHONE HYDROCHLORIDE 0.2 MG: 1 INJECTION, SOLUTION INTRAMUSCULAR; INTRAVENOUS; SUBCUTANEOUS at 13:31

## 2023-10-07 RX ADMIN — HEPARIN SODIUM 5000 UNITS: 5000 INJECTION INTRAVENOUS; SUBCUTANEOUS at 17:11

## 2023-10-07 RX ADMIN — SODIUM CHLORIDE, POTASSIUM CHLORIDE, SODIUM LACTATE AND CALCIUM CHLORIDE 250 ML: 600; 310; 30; 20 INJECTION, SOLUTION INTRAVENOUS at 03:28

## 2023-10-07 RX ADMIN — INSULIN HUMAN 10 UNITS/HR: 1 INJECTION, SOLUTION INTRAVENOUS at 17:49

## 2023-10-07 RX ADMIN — INSULIN HUMAN 15 UNITS/HR: 1 INJECTION, SOLUTION INTRAVENOUS at 00:55

## 2023-10-07 RX ADMIN — CEFAZOLIN 3 G: 10 INJECTION, POWDER, FOR SOLUTION INTRAVENOUS at 03:23

## 2023-10-07 RX ADMIN — Medication: at 22:00

## 2023-10-07 RX ADMIN — ACETAMINOPHEN 650 MG: 325 TABLET ORAL at 20:42

## 2023-10-07 RX ADMIN — CALCIUM GLUCONATE 1 G: 20 INJECTION, SOLUTION INTRAVENOUS at 03:23

## 2023-10-07 RX ADMIN — ACETAMINOPHEN 650 MG: 325 TABLET ORAL at 14:00

## 2023-10-07 RX ADMIN — SODIUM CHLORIDE, POTASSIUM CHLORIDE, SODIUM LACTATE AND CALCIUM CHLORIDE 500 ML: 600; 310; 30; 20 INJECTION, SOLUTION INTRAVENOUS at 10:25

## 2023-10-07 RX ADMIN — HYDROMORPHONE HYDROCHLORIDE 0.2 MG: 1 INJECTION, SOLUTION INTRAMUSCULAR; INTRAVENOUS; SUBCUTANEOUS at 01:21

## 2023-10-07 RX ADMIN — CEFAZOLIN 3 G: 10 INJECTION, POWDER, FOR SOLUTION INTRAVENOUS at 13:13

## 2023-10-07 RX ADMIN — INSULIN HUMAN 20 UNITS/HR: 1 INJECTION, SOLUTION INTRAVENOUS at 09:23

## 2023-10-07 RX ADMIN — GLYCOPYRROLATE 0.4 MG: 0.2 INJECTION, SOLUTION INTRAMUSCULAR; INTRAVENOUS at 01:05

## 2023-10-07 RX ADMIN — POLYETHYLENE GLYCOL 3350 17 G: 17 POWDER, FOR SOLUTION ORAL at 08:20

## 2023-10-07 ASSESSMENT — ENCOUNTER SYMPTOMS
ABDOMINAL PAIN: 0
DYSURIA: 0
UNEXPECTED WEIGHT CHANGE: 0
EYE REDNESS: 0
FATIGUE: 1
DIZZINESS: 0
HEADACHES: 0
NAUSEA: 0
FREQUENCY: 0
POLYDIPSIA: 0
COUGH: 0
TROUBLE SWALLOWING: 0
POLYPHAGIA: 0
BRUISES/BLEEDS EASILY: 0
VOMITING: 0
SORE THROAT: 0
JOINT SWELLING: 0
CONFUSION: 0
DIAPHORESIS: 0
SHORTNESS OF BREATH: 0
NUMBNESS: 0
DIARRHEA: 0
EYE PAIN: 0
AGITATION: 0
LIGHT-HEADEDNESS: 0
CHEST TIGHTNESS: 0
ACTIVITY CHANGE: 0
PALPITATIONS: 0
APPETITE CHANGE: 0

## 2023-10-07 ASSESSMENT — PAIN SCALES - GENERAL
PAINLEVEL_OUTOF10: 0 - NO PAIN
PAINLEVEL_OUTOF10: 2
PAINLEVEL_OUTOF10: 5 - MODERATE PAIN
PAINLEVEL_OUTOF10: 7
PAINLEVEL_OUTOF10: 0 - NO PAIN
PAINLEVEL_OUTOF10: 0 - NO PAIN
PAINLEVEL_OUTOF10: 7
PAINLEVEL_OUTOF10: 2
PAINLEVEL_OUTOF10: 4

## 2023-10-07 ASSESSMENT — PAIN - FUNCTIONAL ASSESSMENT
PAIN_FUNCTIONAL_ASSESSMENT: 0-10

## 2023-10-07 NOTE — H&P
CTICU History & Physical    Subjective   HPI:  Tim Kenney is a 58-year-old male with a long history of coronary artery disease dating back 17 years ago when he have an acute MI with an occluded LAD. PMH of IDDM, LENNY on CPAP, hypertension. He was asymptomatic until a month ago when he started having again chest pain. He was seen by his cardiologist, cardiac cath demonstrated LAD  severe circumflex and severe RCA disease. He was sent to Dr. Lianne Portillo for evaluation in consideration of bypass surgery. Patient is s/p CABG x4 with Dr Lianne Portillo.    TTE:  No echocardiogram results found for the past 12 months      Past Medical History:   Diagnosis Date    Angina pectoris (CMS/[facility])     C. difficile colitis     dx 7/30/23    Coronary artery disease     C 7/25/23: % ISR + collat, 1st diag 75%, LCx mid 95%, RCA mid 50%, PDA distal 90%, LVEF 45%    Diabetes mellitus (CMS/[facility])     takes insulin and semaglutide/Rybelsus    Hyperlipidemia     Hypertension     Joint pain     ankle, Charcot foot    Old myocardial infarction     History of myocardial infarction    Sleep apnea     + CPAP     Past Surgical History:   Procedure Laterality Date    OTHER SURGICAL HISTORY  04/13/2022    No history of surgery     Medications Prior to Admission   Medication Sig Dispense Refill Last Dose    isosorbide mononitrate ER (Imdur) 30 mg 24 hr tablet Take 1 tablet (30 mg) by mouth once daily.   10/6/2023    allopurinol (Zyloprim) 300 mg tablet Take 1 tablet by mouth daily 90 tablet 2 10/5/2023    aspirin 81 mg EC tablet Take 1 tablet (81 mg) by mouth once daily. 81 MG   10/6/2023    atorvastatin (Lipitor) 40 mg tablet Take 1 tablet (40 mg) by mouth once daily.   10/6/2023    gabapentin (Neurontin) 600 mg tablet Take 1 tablet (600 mg) by mouth 5 times a day. 150 tablet 11 10/6/2023    lisinopril 10 mg tablet TAKE 1 TABLET BY MOUTH EVERY DAY 30 tablet 11     lisinopril 2.5 mg tablet Take 1 tablet (2.5 mg) by mouth once daily.   10/6/2023     metoprolol succinate XL (Toprol-XL) 50 mg 24 hr tablet Take 1 tablet (50 mg) by mouth once daily.   10/6/2023    NovoLOG U-100 Insulin aspart 100 unit/mL injection USE VIA INSULIN PUMP, MAX DAILY DOSE 200 UNITS   10/6/2023    semaglutide (Rybelsus) 7 mg tablet Take 1 tablet (7 mg) by mouth once daily. As directed   Past Week    testosterone (Androgel) 50 mg/5 gram (1 %) gel Place 1 packet (50 mg) on the skin 2 times a day.   Past Week     Iodinated contrast media  Social History     Tobacco Use    Smoking status: Never     Passive exposure: Never    Smokeless tobacco: Never   Substance Use Topics    Alcohol use: Never    Drug use: Never     Family History   Problem Relation Name Age of Onset    Other (Cardiac Disorder) Mother      Breast cancer Mother      Cancer Father       Procedure/Surgeon: Lianne Portillo  Frontliner/Anesthesia: Garcy Caro    Procedure:  1.Median Sternotomy  2.CABGx4 (Lima-LAD, SVG-PDA, Radial Y'd off Merna-Diag, Merna-OM off smith of SVG)  3.ESVH R leg  4.ESRH L arm  5.Styker Sternal plates closure  6.Pt part of the Leapps trial       OR Course/Issues: none     CPB time: 106 min  Cross clamp time: 75 min  Echo Pre/Post: wnl  Chest Tubes/Drains: bilateral pleural, 1 med   Temporary wires location/setting: VVI 40      Fluids  Crystalloid: 1L  Colloid: 1L  EBL: 250cc     Anesthesia  Intubation: Easy intubation  Intravenous Access: RIJ MAC, 2 PIV  Regional anesthesia: Single shot SAPs  TOF/ reversal given: No    Scheduled Medications:   acetaminophen, 650 mg, oral, q4h   Or  acetaminophen, 650 mg, rectal, q4h  aspirin, 81 mg, oral, Once   Or  aspirin, 81 mg, nasogastric tube, Once   Or  aspirin, 150 mg, rectal, Once  [START ON 10/7/2023] aspirin, 81 mg, oral, Daily   Or  [START ON 10/7/2023] aspirin, 81 mg, oral, Daily   Or  [START ON 10/7/2023] aspirin, 150 mg, rectal, Daily  ceFAZolin, 2 g, intravenous, q8h  docusate sodium, 100 mg, oral, BID  polyethylene glycol, 17 g, oral, Daily  propofol, , ,           Continuous Medications:   EPINEPHrine, 0-2 mcg/kg/min  insulin regular infusion for cardiac surgery, 0-15 Units/hr  lactated Ringer's, 30 mL/hr  lactated Ringer's, 5 mL/hr  nitroglycerin, 0-200 mcg/min  norepinephrine, 0-1 mcg/kg/min  propofol, 30-50 mcg/kg/min, Last Rate: 30 mcg/kg/min (10/06/23 1918)         PRN Medications:   PRN medications: calcium gluconate, calcium gluconate, dextrose **OR** glucagon, HYDROmorphone, magnesium sulfate, magnesium sulfate, naloxone, oxyCODONE, oxygen, potassium chloride, potassium chloride, propofol    Objective   Vitals:  Most Recent:  Vitals:    10/06/23 2015   BP:    Pulse: 103   Resp: 17   Temp:    SpO2:        24hr Min/Max:  Temp  Min: 36.3 °C (97.3 °F)  Max: 36.4 °C (97.5 °F)  Pulse  Min: 85  Max: 107  BP  Min: 126/71  Max: 126/71  Resp  Min: 16  Max: 24  SpO2  Min: 96 %  Max: 96 %    I/O:  I/O last 2 completed shifts:  In: 5514 (46 mL/kg) [Blood:854; IV Piggyback:4660]  Out: 740 (6.2 mL/kg) [Urine:740 (0.3 mL/kg/hr)]  Weight: 119.9 kg         Vent settings:  PEEP/CPAP (cm H2O):  [0 cm H20-6 cm H20] 6 cm H20    LDA:  CVC 10/06/23 Triple lumen Right Internal jugular (Active)   Placement Date/Time: 10/06/23 (c) 1331   Hand Hygiene Performed Prior to CVC Insertion: Yes  Site Prep: Chlorhexidine   Site Prep Agent has Completely Dried Before Insertion: Yes  All 5 Sterile Barriers Used (Gloves, Gown, Cap, Mask, Large Sterile Zoe...   Number of days: 0       Arterial Line 10/06/23 Right Brachial (Active)   Placement Date/Time: 10/06/23 (c) 1305   Size: 20 G  Orientation: Right  Location: Brachial  Securement Method: Transparent dressing  Patient Tolerance: Tolerated well   Number of days: 0       ETT  7.5 mm (Active)   Placement Date: 10/06/23   ETT Type: ETT - single  Single Lumen Tube Size: 7.5 mm  Cuffed: Yes  Location: Oral   Number of days: 0       Urethral Catheter Non-latex;Temperature probe 14 Fr. (Active)   Placement Date/Time: 10/06/23 1315   Placed by: citlaly  [email or web address]  Hand Hygiene Completed: Yes  Catheter Type: Non-latex;Temperature probe  Tube Size (Fr.): 14 Fr.  Urine Returned: Yes   Number of days: 0       Chest Tube 2 Left Pleural 28 Fr (Active)   Placement Date/Time: 10/06/23 1819   Tube Number: 2  Chest Tube Orientation: Left  Chest Tube Location: Pleural  Chest Tube Drain Tube Size (Fr): 28 Fr   Number of days: 0       Chest Tube 2 Right Pleural 32 Fr (Active)   Placement Date/Time: 10/06/23 1821   Tube Number: 2  Chest Tube Orientation: Right  Chest Tube Location: Pleural  Chest Tube Drain Tube Size (Fr): 32 Fr   Number of days: 0       Chest Tube 1 Mediastinal 28 Fr (Active)   Placement Date/Time: 10/06/23 1821   Tube Number: 1  Chest Tube Location: Mediastinal  Chest Tube Drain Tube Size (Fr): 28 Fr   Number of days: 0         Physical Exam:   Physical Exam  Vitals reviewed.   Constitutional:       Comments: Intubated, sedated   HENT:      Head: Normocephalic and atraumatic.   Eyes:      General: No scleral icterus.     Pupils: Pupils are equal, round, and reactive to light.   Cardiovascular:      Rate and Rhythm: Regular rhythm. Tachycardia present.      Pulses: Normal pulses.      Heart sounds: No murmur heard.  Pulmonary:      Breath sounds: Normal breath sounds.      Comments: Intubated on mechanical ventilation  Abdominal:      General: There is no distension.      Palpations: Abdomen is soft. There is no mass.   Musculoskeletal:         General: No swelling or deformity.   Skin:     General: Skin is warm and dry.         Lab/Radiology/Diagnostic Review:  Results for orders placed or performed during the hospital encounter of 10/06/23 (from the past 24 hour(s))   Type And Screen   Result Value Ref Range    ABO TYPE A     Rh TYPE POS     ANTIBODY SCREEN NEG    VERIFY ABO/Rh Group Test   Result Value Ref Range    ABO TYPE A     Rh TYPE POS    Blood Gas Arterial Full Panel Unsolicited   Result Value Ref Range    POCT pH, Arterial 7.40 7.38 - 7.42 pH    POCT  pCO2, Arterial 42 38 - 42 mm Hg    POCT pO2, Arterial 87 85 - 95 mm Hg    POCT SO2, Arterial 97 94 - 100 %    POCT Oxy Hemoglobin, Arterial 95.8 94.0 - 98.0 %    POCT Hematocrit Calculated, Arterial 38.0 (L) 41.0 - 52.0 %    POCT Sodium, Arterial 136 136 - 145 mmol/L    POCT Potassium, Arterial 4.2 3.5 - 5.3 mmol/L    POCT Chloride, Arterial 104 98 - 107 mmol/L    POCT Ionized Calcium, Arterial 1.13 1.10 - 1.33 mmol/L    POCT Glucose, Arterial 150 (H) 74 - 99 mg/dL    POCT Lactate, Arterial 1.7 0.4 - 2.0 mmol/L    POCT Base Excess, Arterial 1.0 -2.0 - 3.0 mmol/L    POCT HCO3 Calculated, Arterial 26.0 22.0 - 26.0 mmol/L    POCT Hemoglobin, Arterial 12.5 (L) 13.5 - 17.5 g/dL    POCT Anion Gap, Arterial 10 10 - 25 mmo/L    Patient Temperature 37.0 degrees Celsius    FiO2 21 %   Blood Gas Arterial Full Panel Unsolicited   Result Value Ref Range    POCT pH, Arterial 7.31 (L) 7.38 - 7.42 pH    POCT pCO2, Arterial 51 (H) 38 - 42 mm Hg    POCT pO2, Arterial 171 (H) 85 - 95 mm Hg    POCT SO2, Arterial 99 94 - 100 %    POCT Oxy Hemoglobin, Arterial 97.4 94.0 - 98.0 %    POCT Hematocrit Calculated, Arterial 38.0 (L) 41.0 - 52.0 %    POCT Sodium, Arterial 136 136 - 145 mmol/L    POCT Potassium, Arterial 4.2 3.5 - 5.3 mmol/L    POCT Chloride, Arterial 105 98 - 107 mmol/L    POCT Ionized Calcium, Arterial 1.11 1.10 - 1.33 mmol/L    POCT Glucose, Arterial 156 (H) 74 - 99 mg/dL    POCT Lactate, Arterial 1.7 0.4 - 2.0 mmol/L    POCT Base Excess, Arterial -1.2 -2.0 - 3.0 mmol/L    POCT HCO3 Calculated, Arterial 25.7 22.0 - 26.0 mmol/L    POCT Hemoglobin, Arterial 12.5 (L) 13.5 - 17.5 g/dL    POCT Anion Gap, Arterial 10 10 - 25 mmo/L    Patient Temperature 37.0 degrees Celsius    FiO2 60 %   Blood Gas Arterial Full Panel Unsolicited   Result Value Ref Range    POCT pH, Arterial 7.30 (L) 7.38 - 7.42 pH    POCT pCO2, Arterial 51 (H) 38 - 42 mm Hg    POCT pO2, Arterial 172 (H) 85 - 95 mm Hg    POCT SO2, Arterial 99 94 - 100 %    POCT Oxy  Hemoglobin, Arterial 97.4 94.0 - 98.0 %    POCT Hematocrit Calculated, Arterial 38.0 (L) 41.0 - 52.0 %    POCT Sodium, Arterial 135 (L) 136 - 145 mmol/L    POCT Potassium, Arterial 4.2 3.5 - 5.3 mmol/L    POCT Chloride, Arterial 105 98 - 107 mmol/L    POCT Ionized Calcium, Arterial 1.10 1.10 - 1.33 mmol/L    POCT Glucose, Arterial 151 (H) 74 - 99 mg/dL    POCT Lactate, Arterial 1.7 0.4 - 2.0 mmol/L    POCT Base Excess, Arterial -1.9 -2.0 - 3.0 mmol/L    POCT HCO3 Calculated, Arterial 25.1 22.0 - 26.0 mmol/L    POCT Hemoglobin, Arterial 12.7 (L) 13.5 - 17.5 g/dL    POCT Anion Gap, Arterial 9 (L) 10 - 25 mmo/L    Patient Temperature 37.0 degrees Celsius    FiO2 60 %   Coox Panel, Arterial Unsolicited   Result Value Ref Range    POCT Hemoglobin, Arterial 12.7 (L) 13.5 - 17.5 g/dL    POCT Oxy Hemoglobin, Arterial 97.4 94.0 - 98.0 %    POCT Carboxyhemoglobin, Arterial 0.5 %    POCT Methemoglobin, Arterial 0.7 0.0 - 1.5 %    POCT Deoxy Hemoglobin, Arterial 1.4 0.0 - 5.0 %   Blood Gas Arterial Full Panel Unsolicited   Result Value Ref Range    POCT pH, Arterial 7.34 (L) 7.38 - 7.42 pH    POCT pCO2, Arterial 47 (H) 38 - 42 mm Hg    POCT pO2, Arterial 191 (H) 85 - 95 mm Hg    POCT SO2, Arterial 99 94 - 100 %    POCT Oxy Hemoglobin, Arterial 97.7 94.0 - 98.0 %    POCT Hematocrit Calculated, Arterial 37.0 (L) 41.0 - 52.0 %    POCT Sodium, Arterial 136 136 - 145 mmol/L    POCT Potassium, Arterial 4.5 3.5 - 5.3 mmol/L    POCT Chloride, Arterial 105 98 - 107 mmol/L    POCT Ionized Calcium, Arterial 1.09 (L) 1.10 - 1.33 mmol/L    POCT Glucose, Arterial 169 (H) 74 - 99 mg/dL    POCT Lactate, Arterial 1.8 0.4 - 2.0 mmol/L    POCT Base Excess, Arterial -0.8 -2.0 - 3.0 mmol/L    POCT HCO3 Calculated, Arterial 25.4 22.0 - 26.0 mmol/L    POCT Hemoglobin, Arterial 12.2 (L) 13.5 - 17.5 g/dL    POCT Anion Gap, Arterial 10 10 - 25 mmo/L    Patient Temperature 37.0 degrees Celsius    FiO2 60 %   Coox Panel, Arterial Unsolicited   Result  Value Ref Range    POCT Hemoglobin, Arterial 12.2 (L) 13.5 - 17.5 g/dL    POCT Oxy Hemoglobin, Arterial 97.7 94.0 - 98.0 %    POCT Carboxyhemoglobin, Arterial 0.6 %    POCT Methemoglobin, Arterial 0.5 0.0 - 1.5 %    POCT Deoxy Hemoglobin, Arterial 1.3 0.0 - 5.0 %   Blood Gas Arterial Full Panel Unsolicited   Result Value Ref Range    POCT pH, Arterial 7.32 (L) 7.38 - 7.42 pH    POCT pCO2, Arterial 50 (H) 38 - 42 mm Hg    POCT pO2, Arterial 282 (H) 85 - 95 mm Hg    POCT SO2, Arterial 98 94 - 100 %    POCT Oxy Hemoglobin, Arterial 97.4 94.0 - 98.0 %    POCT Hematocrit Calculated, Arterial 31.0 (L) 41.0 - 52.0 %    POCT Sodium, Arterial 137 136 - 145 mmol/L    POCT Potassium, Arterial 4.8 3.5 - 5.3 mmol/L    POCT Chloride, Arterial 107 98 - 107 mmol/L    POCT Ionized Calcium, Arterial 1.04 (L) 1.10 - 1.33 mmol/L    POCT Glucose, Arterial 155 (H) 74 - 99 mg/dL    POCT Lactate, Arterial 2.1 (H) 0.4 - 2.0 mmol/L    POCT Base Excess, Arterial -0.7 -2.0 - 3.0 mmol/L    POCT HCO3 Calculated, Arterial 25.8 22.0 - 26.0 mmol/L    POCT Hemoglobin, Arterial 10.4 (L) 13.5 - 17.5 g/dL    POCT Anion Gap, Arterial 9 (L) 10 - 25 mmo/L    Patient Temperature 37.0 degrees Celsius    FiO2 80 %   Coox Panel, Arterial Unsolicited   Result Value Ref Range    POCT Hemoglobin, Arterial 10.4 (L) 13.5 - 17.5 g/dL    POCT Oxy Hemoglobin, Arterial 97.4 94.0 - 98.0 %    POCT Carboxyhemoglobin, Arterial 0.2 %    POCT Methemoglobin, Arterial 0.6 0.0 - 1.5 %    POCT Deoxy Hemoglobin, Arterial 1.8 0.0 - 5.0 %   Blood Gas Venous Full Panel Unsolicited   Result Value Ref Range    POCT pH, Venous 7.29 (L) 7.33 - 7.43 pH    POCT pCO2, Venous 46 41 - 51 mm Hg    POCT pO2, Venous 47 (H) 35 - 45 mm Hg    POCT SO2, Venous 79 (H) 45 - 75 %    POCT Oxy Hemoglobin, Venous 77.8 (H) 45.0 - 75.0 %    POCT Hematocrit Calculated, Venous 26.0 (L) 41.0 - 52.0 %    POCT Sodium, Venous 137 136 - 145 mmol/L    POCT Potassium, Venous 5.0 3.5 - 5.3 mmol/L    POCT Chloride,  Venous 104 98 - 107 mmol/L    POCT Ionized Calicum, Venous 0.90 (L) 1.10 - 1.33 mmol/L    POCT Glucose, Venous 135 (H) 74 - 99 mg/dL    POCT Lactate, Venous 1.9 0.4 - 2.0 mmol/L    POCT Base Excess, Venous -4.3 (L) -2.0 - 3.0 mmol/L    POCT HCO3 Calculated, Venous 22.1 22.0 - 26.0 mmol/L    POCT Hemoglobin, Venous 8.6 (L) 13.5 - 17.5 g/dL    POCT Anion Gap, Venous 16.0 10.0 - 25.0 mmol/L    Patient Temperature 37.0 degrees Celsius    FiO2 80 %   Coox Panel, Venous Unsolicited   Result Value Ref Range    POCT Carboxyhemoglobin, Venous 1.4 %    POCT Methemoglobin, Venous 0.4 0.0 - 1.5 %   Blood Gas Arterial Full Panel Unsolicited   Result Value Ref Range    POCT pH, Arterial 7.38 7.38 - 7.42 pH    POCT pCO2, Arterial 45 (H) 38 - 42 mm Hg    POCT pO2, Arterial 276 (H) 85 - 95 mm Hg    POCT SO2, Arterial 99 94 - 100 %    POCT Oxy Hemoglobin, Arterial 97.4 94.0 - 98.0 %    POCT Hematocrit Calculated, Arterial 31.0 (L) 41.0 - 52.0 %    POCT Sodium, Arterial 138 136 - 145 mmol/L    POCT Potassium, Arterial 4.8 3.5 - 5.3 mmol/L    POCT Chloride, Arterial 107 98 - 107 mmol/L    POCT Ionized Calcium, Arterial 1.04 (L) 1.10 - 1.33 mmol/L    POCT Glucose, Arterial 168 (H) 74 - 99 mg/dL    POCT Lactate, Arterial 2.4 (H) 0.4 - 2.0 mmol/L    POCT Base Excess, Arterial 1.2 -2.0 - 3.0 mmol/L    POCT HCO3 Calculated, Arterial 26.6 (H) 22.0 - 26.0 mmol/L    POCT Hemoglobin, Arterial 10.4 (L) 13.5 - 17.5 g/dL    POCT Anion Gap, Arterial 9 (L) 10 - 25 mmo/L    Patient Temperature 37.0 degrees Celsius    FiO2 80 %   Coox Panel, Arterial Unsolicited   Result Value Ref Range    POCT Hemoglobin, Arterial 10.4 (L) 13.5 - 17.5 g/dL    POCT Oxy Hemoglobin, Arterial 97.4 94.0 - 98.0 %    POCT Carboxyhemoglobin, Arterial 0.6 %    POCT Methemoglobin, Arterial 0.9 0.0 - 1.5 %    POCT Deoxy Hemoglobin, Arterial 1.1 0.0 - 5.0 %   Blood Gas Arterial Full Panel Unsolicited   Result Value Ref Range    POCT pH, Arterial 7.31 (L) 7.38 - 7.42 pH    POCT  pCO2, Arterial 46 (H) 38 - 42 mm Hg    POCT pO2, Arterial 248 (H) 85 - 95 mm Hg    POCT SO2, Arterial 99 94 - 100 %    POCT Oxy Hemoglobin, Arterial 97.8 94.0 - 98.0 %    POCT Hematocrit Calculated, Arterial 31.0 (L) 41.0 - 52.0 %    POCT Sodium, Arterial 138 136 - 145 mmol/L    POCT Potassium, Arterial 5.4 (H) 3.5 - 5.3 mmol/L    POCT Chloride, Arterial 107 98 - 107 mmol/L    POCT Ionized Calcium, Arterial 1.13 1.10 - 1.33 mmol/L    POCT Glucose, Arterial 179 (H) 74 - 99 mg/dL    POCT Lactate, Arterial 2.9 (H) 0.4 - 2.0 mmol/L    POCT Base Excess, Arterial -3.1 (L) -2.0 - 3.0 mmol/L    POCT HCO3 Calculated, Arterial 23.2 22.0 - 26.0 mmol/L    POCT Hemoglobin, Arterial 10.2 (L) 13.5 - 17.5 g/dL    POCT Anion Gap, Arterial 13 10 - 25 mmo/L    Patient Temperature 37.0 degrees Celsius    FiO2 80 %   Coox Panel, Arterial Unsolicited   Result Value Ref Range    POCT Hemoglobin, Arterial 10.2 (L) 13.5 - 17.5 g/dL    POCT Oxy Hemoglobin, Arterial 97.8 94.0 - 98.0 %    POCT Carboxyhemoglobin, Arterial 0.7 %    POCT Methemoglobin, Arterial 0.5 0.0 - 1.5 %    POCT Deoxy Hemoglobin, Arterial 1.0 0.0 - 5.0 %   Blood Gas Arterial Full Panel Unsolicited   Result Value Ref Range    POCT pH, Arterial 7.32 (L) 7.38 - 7.42 pH    POCT pCO2, Arterial 46 (H) 38 - 42 mm Hg    POCT pO2, Arterial 95 85 - 95 mm Hg    POCT SO2, Arterial 98 94 - 100 %    POCT Oxy Hemoglobin, Arterial 96.1 94.0 - 98.0 %    POCT Hematocrit Calculated, Arterial 33.0 (L) 41.0 - 52.0 %    POCT Sodium, Arterial 139 136 - 145 mmol/L    POCT Potassium, Arterial 4.0 3.5 - 5.3 mmol/L    POCT Chloride, Arterial 108 (H) 98 - 107 mmol/L    POCT Ionized Calcium, Arterial 1.23 1.10 - 1.33 mmol/L    POCT Glucose, Arterial 181 (H) 74 - 99 mg/dL    POCT Lactate, Arterial 3.7 (H) 0.4 - 2.0 mmol/L    POCT Base Excess, Arterial -2.5 (L) -2.0 - 3.0 mmol/L    POCT HCO3 Calculated, Arterial 23.7 22.0 - 26.0 mmol/L    POCT Hemoglobin, Arterial 11.1 (L) 13.5 - 17.5 g/dL    POCT Anion  Gap, Arterial 11 10 - 25 mmo/L    Patient Temperature 37.0 degrees Celsius    FiO2 50 %   Coox Panel, Arterial Unsolicited   Result Value Ref Range    POCT Hemoglobin, Arterial 11.1 (L) 13.5 - 17.5 g/dL    POCT Oxy Hemoglobin, Arterial 96.1 94.0 - 98.0 %    POCT Carboxyhemoglobin, Arterial 0.7 %    POCT Methemoglobin, Arterial 0.7 0.0 - 1.5 %    POCT Deoxy Hemoglobin, Arterial 2.5 0.0 - 5.0 %   Blood Gas Arterial Full Panel Unsolicited   Result Value Ref Range    POCT pH, Arterial 7.32 (L) 7.38 - 7.42 pH    POCT pCO2, Arterial 44 (H) 38 - 42 mm Hg    POCT pO2, Arterial 192 (H) 85 - 95 mm Hg    POCT SO2, Arterial 99 94 - 100 %    POCT Oxy Hemoglobin, Arterial 97.5 94.0 - 98.0 %    POCT Hematocrit Calculated, Arterial 32.0 (L) 41.0 - 52.0 %    POCT Sodium, Arterial 139 136 - 145 mmol/L    POCT Potassium, Arterial 3.6 3.5 - 5.3 mmol/L    POCT Chloride, Arterial 108 (H) 98 - 107 mmol/L    POCT Ionized Calcium, Arterial 1.10 1.10 - 1.33 mmol/L    POCT Glucose, Arterial 162 (H) 74 - 99 mg/dL    POCT Lactate, Arterial 4.7 (HH) 0.4 - 2.0 mmol/L    POCT Base Excess, Arterial -3.4 (L) -2.0 - 3.0 mmol/L    POCT HCO3 Calculated, Arterial 22.7 22.0 - 26.0 mmol/L    POCT Hemoglobin, Arterial 10.8 (L) 13.5 - 17.5 g/dL    POCT Anion Gap, Arterial 12 10 - 25 mmo/L    Patient Temperature 37.0 degrees Celsius    FiO2 100 %   Coox Panel, Arterial Unsolicited   Result Value Ref Range    POCT Hemoglobin, Arterial 10.8 (L) 13.5 - 17.5 g/dL    POCT Oxy Hemoglobin, Arterial 97.5 94.0 - 98.0 %    POCT Carboxyhemoglobin, Arterial 1.2 %    POCT Methemoglobin, Arterial 0.6 0.0 - 1.5 %    POCT Deoxy Hemoglobin, Arterial 0.8 0.0 - 5.0 %   Blood Gas Arterial Full Panel Unsolicited   Result Value Ref Range    POCT pH, Arterial 7.30 (L) 7.38 - 7.42 pH    POCT pCO2, Arterial 45 (H) 38 - 42 mm Hg    POCT pO2, Arterial 223 (H) 85 - 95 mm Hg    POCT SO2, Arterial 99 94 - 100 %    POCT Oxy Hemoglobin, Arterial 97.4 94.0 - 98.0 %    POCT Hematocrit  Calculated, Arterial 32.0 (L) 41.0 - 52.0 %    POCT Sodium, Arterial 140 136 - 145 mmol/L    POCT Potassium, Arterial 3.4 (L) 3.5 - 5.3 mmol/L    POCT Chloride, Arterial 107 98 - 107 mmol/L    POCT Ionized Calcium, Arterial 1.04 (L) 1.10 - 1.33 mmol/L    POCT Glucose, Arterial 167 (H) 74 - 99 mg/dL    POCT Lactate, Arterial 5.6 (HH) 0.4 - 2.0 mmol/L    POCT Base Excess, Arterial -4.2 (L) -2.0 - 3.0 mmol/L    POCT HCO3 Calculated, Arterial 22.1 22.0 - 26.0 mmol/L    POCT Hemoglobin, Arterial 10.5 (L) 13.5 - 17.5 g/dL    POCT Anion Gap, Arterial 14 10 - 25 mmo/L    Patient Temperature 37.0 degrees Celsius    FiO2 100 %   Coox Panel, Arterial Unsolicited   Result Value Ref Range    POCT Hemoglobin, Arterial 10.5 (L) 13.5 - 17.5 g/dL    POCT Oxy Hemoglobin, Arterial 97.4 94.0 - 98.0 %    POCT Carboxyhemoglobin, Arterial 0.9 %    POCT Methemoglobin, Arterial 0.7 0.0 - 1.5 %    POCT Deoxy Hemoglobin, Arterial 1.0 0.0 - 5.0 %     Electrocardiogram 12 Lead    Result Date: 10/2/2023  Normal sinus rhythm Nonspecific T wave abnormality Abnormal ECG When compared with ECG of 15-APR-2023 17:08, Previous ECG has undetermined rhythm, needs review T wave inversion now evident in Lateral leads Confirmed by Radha Barone ([id number]) on 10/2/2023 8:40:11 AM    Echocardiogram    Result Date: 9/26/2023  [place] [address] Tel [phone] Fax [phone] TRANSTHORACIC ECHOCARDIOGRAM REPORT Patient Name:     YASMANI De La Cruz Physician:   [id number] Ismael Gale DO Study Date:       9/25/2023      Referring Physician: JAMES ZARAGOZA MRN/PID:          [id number]       PCP: Accession/Order#: [id number]      Department Location: [facility] Echo Lab YOB: 1965       Fellow: Gender:           M              Nurse: Admit Date:       9/25/2023      Sonographer:         Nela JACOBSON Admission Status: Outpatient     Additional Staff: Height:           188.00 cm      CC Report to: Weight:            118.00 kg      Study Type:          Echocardiogram BSA:              2.43 m2 Blood Pressure: 150 /88 mmHg Diagnosis/ICD: Z01.810-Encounter for preprocedural cardiovascular examination Indication:    PRE-OP CABG Procedure/CPT: Echo Complete w Full [id number] Patient History: Diabetes:          Yes Pertinent History: HTN, Hyperlipidemia and CAD. Study Detail: The following Echo studies were performed: 2D, M-Mode, Doppler and color flow. The patient was awake. PHYSICIAN INTERPRETATION: Left Ventricle: Left ventricular systolic function is low normal, with an estimated ejection fraction of 50-55%. There are no regional wall motion abnormalities. The left ventricular cavity size is normal. There is moderate concentric left ventricular hypertrophy. Spectral Doppler shows an impaired relaxation pattern of left ventricular diastolic filling. LV Wall Scoring: The basal inferolateral segment is hypokinetic. All remaining scored segments are normal. Left Atrium: The left atrium is mild to moderately dilated. Right Ventricle: The right ventricle is normal in size. There is normal right ventricular global systolic function. Right Atrium: The right atrium is normal in size. Aortic Valve: The aortic valve appears structurally normal. The aortic valve appears tricuspid. There is no evidence of aortic valve stenosis. There is no evidence of aortic valve regurgitation. The peak instantaneous gradient of the aortic valve is 5.3 mmHg. The mean gradient of the aortic valve is 3.0 mmHg. Mitral Valve: The mitral valve is normal in structure. There is no evidence of mitral valve stenosis. There is normal mitral valve leaflet mobility. There is no evidence of mitral valve regurgitation. Tricuspid Valve: The tricuspid valve is structurally normal. There is normal tricuspid valve leaflet mobility. There is trace tricuspid regurgitation. Pulmonic Valve: The pulmonic valve is structurally normal. There is no indication of pulmonic  valve regurgitation. Pericardium: There is no pericardial effusion noted. Aorta: The aortic root is normal. Pulmonary Artery: The main pulmonary artery is normal in size, and position, with normal bifurcation into the left and right pulmonary arteries. Systemic Veins: The inferior vena cava appears to be of normal size. In comparison to the previous echocardiogram(s): There are no prior studies on this patient for comparison purposes. CONCLUSIONS: 1. Left ventricular systolic function is low normal with a 50-55% estimated ejection fraction. 2. Basal inferolateral segment is abnormal. 3. Spectral Doppler shows an impaired relaxation pattern of left ventricular diastolic filling. 4. There is moderate concentric left ventricular hypertrophy. 5. The left atrium is mild to moderately dilated. 6. There is no evidence of mitral valve stenosis. 7. No evidence of mitral valve regurgitation. 8. Trace tricuspid regurgitation is visualized. 9. Aortic valve stenosis is not present. 10. The main pulmonary artery is normal in size, and position, with normal bifurcation into the left and right pulmonary arteries. QUANTITATIVE DATA SUMMARY: 2D MEASUREMENTS: Normal Ranges: Ao Root d:     3.00 cm   (2.0-3.7cm) LAs:           4.80 cm   (2.7-4.0cm) IVSd:          1.43 cm   (0.6-1.1cm) LVPWd:         1.74 cm   (0.6-1.1cm) LVIDd:         3.61 cm   (3.9-5.9cm) LVIDs:         2.72 cm LV Mass Index: 90.8 g/m2 LV % FS        24.7 % LA VOLUME: Normal Ranges: LA Vol A4C:        37.0 ml    (22+/-6mL/m2) LA Vol A2C:        44.2 ml LA Vol BP:         41.3 ml LA Vol Index A4C:  15.2ml/m2 LA Vol Index A2C:  18.2 ml/m2 LA Vol Index BP:   17.0 ml/m2 LA Area A4C:       14.8 cm2 LA Area A2C:       16.5 cm2 LA Major Axis A4C: 5.0 cm LA Major Axis A2C: 5.2 cm LA Volume Index:   16.0 ml/m2 LA Vol A4C:        34.0 ml LA Vol A2C:        42.0 ml RA VOLUME BY A/L METHOD: Normal Ranges: RA Vol A4C:        25.7 ml    (8.3-19.5ml) RA Vol Index A4C:  10.6 ml/m2 RA  Area A4C:       12.8 cm2 RA Major Axis A4C: 5.4 cm AORTA MEASUREMENTS: Normal Ranges: Asc Ao, d: 3.20 cm (2.1-3.4cm) LV SYSTOLIC FUNCTION BY 2D PLANIMETRY (MOD): Normal Ranges: EF-A4C View: 58.0 % (>=55%) EF-A2C View: 54.8 % EF-Biplane:  54.8 % LV DIASTOLIC FUNCTION: Normal Ranges: MV Peak E:        0.56 m/s    (0.7-1.2 m/s) MV Peak A:        0.93 m/s    (0.42-0.7 m/s) E/A Ratio:        0.61        (1.0-2.2) MV lateral e'     0.10 m/s MV medial e'      0.04 m/s E/e' Ratio:       5.70        (<8.0) PulmV Sys Harley:    58.60 cm/s PulmV Avalos Harley:   48.60 cm/s PulmV S/D Harley:    1.20 PulmV A Revs Harley: 36.20 cm/s PulmV A Revs Dur: 116.00 msec MITRAL VALVE: Normal Ranges: MV DT: 119 msec (150-240msec) AORTIC VALVE: Normal Ranges: AoV Vmax:                1.15 m/s (<=1.7m/s) AoV Peak P.3 mmHg (<20mmHg) AoV Mean PG:             3.0 mmHg (1.7-11.5mmHg) LVOT Max Harley:            0.94 m/s (<=1.1m/s) AoV VTI:                 17.50 cm (18-25cm) LVOT VTI:                14.80 cm LVOT Diameter:           2.20 cm  (1.8-2.4cm) AoV Area, VTI:           3.21 cm2 (2.5-5.5cm2) AoV Area,Vmax:           3.10 cm2 (2.5-4.5cm2) AoV Dimensionless Index: 0.85 RIGHT VENTRICLE: RV Basal 3.80 cm RV Mid   3.37 cm RV Major 7.3 cm TAPSE:   23.7 mm RV s'    0.17 m/s TRICUSPID VALVE/RVSP: Normal Ranges: Peak TR Velocity: 2.36 m/s RV Syst Pressure: 25.3 mmHg (< 30mmHg) IVC Diam:         1.60 cm PULMONIC VALVE: Normal Ranges: PV Accel Time: 132 msec (>120ms) PV Max Harley:    1.1 m/s  (0.6-0.9m/s) PV Max P.0 mmHg Pulmonary Veins: PulmV A Revs Dur: 116.00 msec PulmV A Revs Harley: 36.20 cm/s PulmV Avalos Harley:   48.60 cm/s PulmV S/D Harley:    1.20 PulmV Sys Harley:    58.60 cm/s [id number] Ismael Baljinder CLIFFORD Electronically signed on 2023 at 8:57:44 AM Wall Scoring   CT chest wo IV contrast    Result Date: 2023  Interpreted By:  CATIE VELASCO MD MRN: [id number] Patient Name: YASMANI ST  STUDY: CT CHEST WO CONTRAST;  2023 12:44 pm   INDICATION: pre op cardiac surgery .  COMPARISON: 04/15/2023.  ACCESSION NUMBER(S): [id number]  ORDERING CLINICIAN: HALINA CONTRERAS  TECHNIQUE: Contiguous unenhanced axial images were obtained through the chest. Images were reformatted in axial, coronal, and sagittal planes.  FINDINGS: LUNGS and AIRWAYS: Several small calcified granulomas are again seen in the right lung. Mild dependent and basilar atelectasis is present bilaterally. No localized infiltrate or consolidation.  No pleural effusion or pneumothorax.  MEDIASTINUM and CLAUDIO, LOWER NECK AND AXILLA: No thoracic aortic aneurysm. No appreciable atherosclerotic calcifications of the ascending thoracic aorta. Small atherosclerotic calcifications are seen in the distal aortic arch. Dense coronary artery calcifications and/or stents are present. No axillary lymphadenopathy.  HEART and VESSELS: Thoracic aorta is normal in course and caliber without aneurysm. Trace pericardial fluid is present. Heart is not significantly enlarged.  UPPER ABDOMEN: There are multiple small calcifications in the right hepatic lobe posterior segment with multifocal fat attenuation nodular foci similar to prior  CHEST WALL and OSSEOUS STRUCTURES: Multilevel disc space narrowing and predominantly anterior endplate spurring/partially bridging osteophytes are greatest in the lower thoracic spine. Ovoid lucent lesions in the T9 and T11 vertebral bodies with intrinsic sclerotic striations are again seen most compatible with vertebral body hemangiomas.  Bilateral gynecomastia is again seen.      1.  No thoracic aortic aneurysm. No atherosclerotic calcifications of the ascending thoracic aorta. 2. Mild dependent and basilar atelectasis. No localized consolidation. 3. Right hepatic lobe posterior segment multifocal fat attenuation nodular foci of uncertain etiology similar to prior.  MACRO: None.    XR chest 2 view    Result Date: 9/25/2023  Interpreted By:  MARCELA SHAH MD MRN: [id number] Patient  Name: YASMANI ST  STUDY: TH CHEST 2 VIEW PA AND LAT  INDICATION: pre op cardiac surgery .  COMPARISON: None  ACCESSION NUMBER(S): [id number]  ORDERING CLINICIAN: HALINA CONTRERAS  FINDINGS: No consolidation, effusion, edema, or pneumothorax. Heart size within normal limits.       No evidence of acute intrathoracic abnormality.    Vascular US Lower Extremity Venous Duplex Bilateral    Result Date: 9/25/2023  Interpreted By:  MARIAM CORTES MD MRN: [id number] Patient Name: YASMANI ST  STUDY: DUPLEX LOWER EXTREMITY VEINS, BILATERAL;  9/25/2023 1:24 pm  INDICATION: pre op cardiac surgery .  COMPARISON: None.  ACCESSION NUMBER(S): [id number]  ORDERING CLINICIAN: HALINA CONTRERAS  TECHNIQUE: Real time duplex sonographic evaluation of the lower extremity venous systems was performed for venous mapping.  FINDINGS: Right Greater Saphenous Vein: Thigh- Proximal-0.7 x 0.8 cm Mid-0.5 x 0.6 cm Distal-0.5 x 0.4 cm  Calf-   Proximal-0.4 x 0.5 cm Mid-0.6 x 0.3 cm Distal-0.4 x 0.4 cm  Right Lesser Saphenous Vein: Proximal-0.3 x 0.3 cm Mid-0.3 x 0.3 cm Distal-0.3 x 0.3 cm   Left Greater Saphenous Vein: Thigh- Proximal-0.6 x 0.7 cm Mid-0.5 x 0.5 cm Distal-0.5 x 0.5 cm  Calf-   Proximal-0.5 x 0.5 cm Mid-0.5 x 0.4 cm Distal-0.6 x 0.4 cm  Left Lesser Saphenous Vein: Proximal-0.3 x 0.4 cm Mid-0.3 x 0.3 cm Distal-0.3 x 0.3 cm      Lower extremity venous mapping as detailed above.  MACRO: None.    Vascular US Carotid Artery Duplex Bilateral    Result Date: 9/25/2023  Interpreted By:  MARIAM CORTES MD MRN: [id number] Patient Name: YASMANI ST  STUDY: US CAROTID BILATERAL DUPLEX;  9/25/2023 1:13 pm  INDICATION: pre op cardiac surgery .  COMPARISON: None.  ACCESSION NUMBER(S): [id number]  ORDERING CLINICIAN: HALINA CONTRERAS  TECHNIQUE: Vascular ultrasound of the extracranial carotid system was performed bilaterally.  Gray scale, color Doppler and spectral Doppler waveform analysis was performed.  FINDINGS: There is mild atherosclerotic  disease/intimal thickening. No large/sizable plaque is noted.  RIGHT:  RIGHT SIDE PEAK SYSTOLIC VELOCITY TABLE:  cm/sec. ICA 70 cm/sec.  cm/sec.  The ratio of the peak systolic velocity of the right ICA/CCA is 0.6.  RIGHT VERTEBRAL ARTERY: The right vertebral artery demonstrates proximal anterograde flow.  LEFT:  LEFT SIDE PEAK SYSTOLIC VELOCITY TABLE:  cm/sec.  cm/sec.  cm/sec.  The ratio of the peak systolic velocity of the left ICA/CCA is 1.4.  LEFT VERTEBRAL ARTERY: The left vertebral artery demonstrates proximal anterograde flow.      Utilizing the peak systolic velocities, the estimated degree of stenosis within the internal carotid arteries is less than 50% bilaterally.  MACRO: None.      Adult Cath    Result Date: 9/25/2023  [facility], Cath Lab [address] [place] [address] Cardiovascular Catheterization Report Patient Name:     YASMANI Prakash           [id number]       Physician:           MD Study Date:       9/25/2023    Verifying Physician: Jessica Hernandez MD MRN/PID:          [id number]     Cardiologist: Accession/Order#: [id number]    Referring Physician: [id number] Gonzalez Cano MD YOB: 1965     Referring Physician: Gender:           M            Referring Physician: Jessica Hernandez MD Study: Left Heart Catheterization Indications: YASMANI ST is a 58 year old male who presents with dyslipidemia, hypertension, prior myocardial infarction, family hx, diabetes, coronary artery disease, prior percutaneous coronary intervention and a chest pain assessment of typical angina. Worsening angina. Procedure Description: After infiltration with 2% Lidocaine, the right femoral artery was cannulated with a modified Seldinger technique. Subsequently a 5 Czech sheath was placed in the right femoral artery. Selective coronary catheterization was performed using a 5 Fr catheter(s) exchanged over a  guide wire to cannulate the coronary arteries. A JL 4 tip catheter was used for left coronary injections. A 3drc tip catheter was used for right coronary injections. Multiple injections of contrast were made into the left and right coronary arteries with angiograms recorded in multiple projections. Retrograde left heart catheterizion was accomplished with a 5 Fr. pigtail catheter. A single plane left ventriculogram was recorded in the 30 degree MAURICIO projection. The contrast dose was 20 ml injected at 10 ml/sec. The catheter was then withdrawn across the aortic valve under continuous pressure monitoring and removed. After completion of the procedure, femoral artery angiography was performed. This demonstrated a common femoral artery puncture appropriate for closure. A Vascade 5F vascular closure Device was placed per protocol. Coronary Angiography: The coronary circulation is right dominant. Left Main Coronary Artery: There is <10% stenosis in the entire left main coronary artery. Left Anterior Descending Coronary Artery Distribution: There is 100% stenosis in the mid left anterior descending artery. Fills via collaterals. There is evidence of instent restenosis in this segment. There is 75% stenosis in the 1st Diag left anterior descending artery. Circumflex Coronary Artery Distribution: There is 95% stenosis in the the mid Circumflex artery. Right Coronary Artery Distribution: There is 50% stenosis in the the mid Right Coronary Artery. There is 90% stenosis in the Distal Posterior Descending Artery Right Coronary Artery. Left Ventriculography: The estimated left ventricular ejection fraction is abnormal at 45%. Hemo Personnel: +---------------------------+-------------+ Name                       Duty          +---------------------------+-------------+ Yoni Phillips MD, MD 1 +---------------------------+-------------+ Louies Pierce         PROC SCRUB 1  +---------------------------+-------------+ Jo-Ann Cook RN          PROC CIRC 1 +---------------------------+-------------+ Dominick Boston         PROC RECORD 1 +---------------------------+-------------+ Graciela Sanders RN         PROC NURSE 1 +---------------------------+-------------+ Casaletta, Kimberly RN      PROC NURSE 2 +---------------------------+-------------+ Sedation Time: +------------------------+----------------------------------------+ Sedation Start/End TimesTime                                     +------------------------+----------------------------------------+ Start                   9/25/2023 08:04:42                       +------------------------+----------------------------------------+ Drugs                   Versed 2 mcg IV per physician for sedati +------------------------+----------------------------------------+ Equipment Used: +--------------------+---------------------------------------------------------+            Date/Time                                              Description +--------------------+---------------------------------------------------------+ 9/25/2023 8:08:36 AM    - 5F [place] Sheath w/ Wire - Qty: 1 Part #:                                                                          [id number] +--------------------+---------------------------------------------------------+ 9/25/2023 8:08:37 AM     - 5F JL4 [employer] - Qty: 1 Part #: 35 +--------------------+---------------------------------------------------------+ 9/25/2023 8:08:37 AM             - 5F 3DRC - Qty: 1 Part #: 24 +--------------------+---------------------------------------------------------+ 9/25/2023 8:08:37 AM - 5F [employer] [address] [employer] - Qty: 1                                                                    Part #: 47 +--------------------+---------------------------------------------------------+ Hemodynamic Pressures:  +----+-------------------+---------+------------+-------------+------+---------+ Site     Date Time       Phase    Systolic    Diastolic    ED  Mean mmHg                          Name       mmHg        mmHg      mmHg           +----+-------------------+---------+------------+-------------+------+---------+   AO  9/25/2023 8:12:57 AIR REST         121           91            106                      AM                                                  +----+-------------------+---------+------------+-------------+------+---------+   AO  9/25/2023 8:15:35 AIR REST         132           97            114                      AM                                                  +----+-------------------+---------+------------+-------------+------+---------+   AO  9/25/2023 8:16:14 AIR REST         131           96            114                      AM                                                  +----+-------------------+---------+------------+-------------+------+---------+   LV  9/25/2023 8:18:12 AIR REST         135           17    22                               AM                                                  +----+-------------------+---------+------------+-------------+------+---------+ Cardiac Cath Transition of Care Summary: Post Procedure Diagnosis: Triple vessel disease. Blood Loss:               Estimated blood loss during the procedure was 0 mls. Specimens Removed:        Number of specimen(s) removed: none. ____________________________________________________________________________________ CONCLUSIONS: 1. The entire Left Main: <10% stenosis. 2. Left Anterior Descending Artery: fills via collaterals. 3. Mid LAD Lesion: The percent stenosis is 100%. 4. Mid LAD Lesion: Instent Restenosis. 5. 1st Diag LAD Lesion: The percent stenosis is 75%. 6. Mid CX Lesion: The percent stenosis is 95%. 7. Mid RCA Lesion: The percent  stenosis is 50%. 8. Distal PDA RCA Lesion: The percent stenosis is 90%. 9. The Left Ventricular Ejection Fraction is 45-50%. ____________________________________________________________________________________ CPT Codes: Left Heart Cath (visualization of coronaries) and [id number]; Moderate Sedation Services initial 15 minutes patient >5 years-43838 ICD 10 Codes: I25.119-Atherosclerotic heart disease of native coronary artery with unspecified angina pectoris [id number] Yoni Hernandez MD Performing Physician Electronically signed by [id number] Yoni Hernandez MD on 9/25/2023 at 8:44:20 AM cc Report to: [id number] Gonzalez Cano MD cc Report to: [id number] Yoni Christie        This patient is intubated   Reason for patient to remain intubated today? they are planned for extubation trial later today/tonight             Assessment/Plan     Assessment: Tim Kenney is a 58-year-old male with a hx of IDDM, LENNY on CPAP, HTN, CAD, MI. He was asymptomatic until a month ago when he started having chest pain. He was seen by his cardiologist, cardiac cath demonstrated LAD  severe circumflex and severe RCA disease. He was sent to Dr. Lianne Portillo for evaluation in consideration of bypass surgery. Patient is s/p CABG x4 with Dr Lianne Portillo.     Plan:  NEURO:  Patient is intubated and sedated on propofol infusion. Acute post operative pain.   -->  - Serial neuro and pain assessments   - Continue propofol until NMB reversal, then daily sedation vacation at minimum   - NMB reversal when normothermic and hemodynamically stable.    - PRN oxycodone  - PRN dilaudid for pain   - PT Consult, OOB to chair as tolerated, chair position if not tolerated   - CAM ICU score qshift  - Sleep/wake cycle hygiene     CV:  Patient has a history of HTN, CAD, MI. Is now status post CABG x4 Pre/Post EF: WNL. Arrived to CTICU on on Epi .02, levo .04, nitro at 5. V epicardial wires set off-->  - Maintain goal MAP 65-90  - Volume resuscitate as  clinically indicated  - If CABG is 2/2 STEMI/unstable angina, will receive Plavix POD2  - Start statin POD1  - Hold home metop, lisinopril, imdur     PULM:  Hx of LENNY on CPAP. Currently intubated on ventilator. Chest tubes bilateral pleural and 1 med.-->  - F/u post op CXR  - Once reversed, wean ventilator settings towards CPAP & extubation   - Wean FiO2 maintaining SpO2 >92%.   - IS q1h and OOB to chair when extubated  - Chest tubes to wall suction.    GI:  OG in place.-->  - Continue PPI until extubated  - NPO, will perform bedside swallow eval post extubation   - Colace/senna BID and miralax BID     :  No history of renal disease, baseline creatinine .9. Creatinine stable post-op. Hutchinson in place and making adequate UOP. -->  - Continue hutchinson catheter for strict I/Os.  - Goal UOP 0.5ml/kg/hr  - RFP as clinically indicated  - Replete electrolytes per CTICU protocol     ENDO:  PMH of IDDM on insulin pump and semaglutide. A1c: 8-->  - Maintain BG <180, insulin per CTICU protocol     HEME:  Acute blood loss anemia and thrombocytopenia.-->    - Monitor drain output volume and characteristics  - CBC, coags, and fibrinogen post op and as clinically indicated  - Start ASA 6hrs post-op for CABG  - If CABG is 2/2 STEMI/unstable angina, will receive Plavix POD2  - SQH tomorrow   - SCDs for DVT prophylaxis.  - Last type and screen: 10/6     ID:  Afebrile, no current indications of infection. MRSA neg.-->  - Trend temp q4h  - Periop cefazolin x 48hrs     Skin:  No active skin issues.  - preventative Mepilex dressings in place on sacrum and heels  - change preventative Mepilex weekly or more frequently as indicated (when moist/soiled)   - every shift skin assessment per nursing and weekly ICU skin rounds  - moisture barrier to be applied with gwen care  - active skin problems addressed with nursing on daily rounds     Proph:  SCDs  PPI     G:  Line  Right IJ MAC w [place] placed 10/6  Left brachial a-line placed  10/6  Rafael Kate    F: Family: will update at bedside postoperatively.     A,B,C,D,E,F,G: reviewed       Dispo: CTICU care for now.

## 2023-10-07 NOTE — PROGRESS NOTES
"Tim Kenney is a 58 y.o. male on day 1 of admission presenting with Coronary artery disease of native artery of native heart with stable angina pectoris (CMS/HCC).    Subjective   Intubated and mildly sedated. Awakes to name, follows commands. On CPAP trial with good parameters. Pain well controlled.        Objective     Physical Exam  Vitals reviewed.   Constitutional:       General: He is not in acute distress.     Appearance: He is not toxic-appearing.      Interventions: He is intubated.   HENT:      Head: Normocephalic and atraumatic.      Mouth/Throat:      Mouth: Mucous membranes are dry.   Eyes:      General: No scleral icterus.     Extraocular Movements: Extraocular movements intact.      Conjunctiva/sclera: Conjunctivae normal.   Cardiovascular:      Rate and Rhythm: Regular rhythm. Tachycardia present.      Pulses: Normal pulses.      Heart sounds: Normal heart sounds. No murmur heard.  Pulmonary:      Effort: Pulmonary effort is normal. He is intubated.      Breath sounds: Normal breath sounds. No wheezing or rhonchi.   Abdominal:      General: Abdomen is flat. There is no distension.      Palpations: Abdomen is soft. There is no mass.      Tenderness: There is no abdominal tenderness. There is no guarding or rebound.   Skin:     General: Skin is warm and dry.      Findings: No lesion or rash.   Neurological:      General: No focal deficit present.      Mental Status: He is alert.      Sensory: Sensation is intact.      Motor: Motor function is intact.         Last Recorded Vitals  Blood pressure 126/71, pulse 107, temperature 37.7 °C (99.9 °F), temperature source Bladder, resp. rate 10, height 1.88 m (6' 2\"), weight 118 kg (260 lb 2.3 oz), SpO2 97 %.  Intake/Output last 3 Shifts:  I/O last 3 completed shifts:  In: 24019 (87.6 mL/kg) [I.V.:270 (2.3 mL/kg); Blood:1603; IV Piggyback:8460]  Out: 2680 (22.7 mL/kg) [Urine:2150 (0.5 mL/kg/hr); Chest Tube:530]  Weight: 118 kg     Relevant Results     Scheduled " medications  acetaminophen, 650 mg, oral, q6h  aspirin, 81 mg, oral, Daily  atorvastatin, 80 mg, oral, Daily  ceFAZolin, 3 g, intravenous, q8h  docusate sodium, 100 mg, oral, BID  heparin (porcine), 5,000 Units, subcutaneous, q8h  insulin glargine, 40 Units, subcutaneous, q24h  pantoprazole, 40 mg, intravenous, Daily before breakfast  polyethylene glycol, 17 g, oral, Daily      Continuous medications  insulin regular infusion for cardiac surgery, 0-30 Units/hr, Last Rate: 20 Units/hr (10/07/23 0923)  lactated Ringer's, 5 mL/hr, Last Rate: 5 mL/hr (10/07/23 1200)  nitroglycerin, 0-200 mcg/min, Last Rate: 5 mcg/min (10/07/23 1200)      PRN medications  PRN medications: calcium gluconate, calcium gluconate, dextrose 10 % in water (D10W), dextrose **OR** glucagon, dextrose, glucagon, HYDROmorphone, magnesium sulfate, magnesium sulfate, naloxone, oxyCODONE, oxygen, potassium chloride, potassium chloride    Results for orders placed or performed during the hospital encounter of 10/06/23 (from the past 24 hour(s))   Blood Gas Arterial Full Panel Unsolicited   Result Value Ref Range    POCT pH, Arterial 7.40 7.38 - 7.42 pH    POCT pCO2, Arterial 42 38 - 42 mm Hg    POCT pO2, Arterial 87 85 - 95 mm Hg    POCT SO2, Arterial 97 94 - 100 %    POCT Oxy Hemoglobin, Arterial 95.8 94.0 - 98.0 %    POCT Hematocrit Calculated, Arterial 38.0 (L) 41.0 - 52.0 %    POCT Sodium, Arterial 136 136 - 145 mmol/L    POCT Potassium, Arterial 4.2 3.5 - 5.3 mmol/L    POCT Chloride, Arterial 104 98 - 107 mmol/L    POCT Ionized Calcium, Arterial 1.13 1.10 - 1.33 mmol/L    POCT Glucose, Arterial 150 (H) 74 - 99 mg/dL    POCT Lactate, Arterial 1.7 0.4 - 2.0 mmol/L    POCT Base Excess, Arterial 1.0 -2.0 - 3.0 mmol/L    POCT HCO3 Calculated, Arterial 26.0 22.0 - 26.0 mmol/L    POCT Hemoglobin, Arterial 12.5 (L) 13.5 - 17.5 g/dL    POCT Anion Gap, Arterial 10 10 - 25 mmo/L    Patient Temperature 37.0 degrees Celsius    FiO2 21 %   Blood Gas Arterial  Full Panel Unsolicited   Result Value Ref Range    POCT pH, Arterial 7.31 (L) 7.38 - 7.42 pH    POCT pCO2, Arterial 51 (H) 38 - 42 mm Hg    POCT pO2, Arterial 171 (H) 85 - 95 mm Hg    POCT SO2, Arterial 99 94 - 100 %    POCT Oxy Hemoglobin, Arterial 97.4 94.0 - 98.0 %    POCT Hematocrit Calculated, Arterial 38.0 (L) 41.0 - 52.0 %    POCT Sodium, Arterial 136 136 - 145 mmol/L    POCT Potassium, Arterial 4.2 3.5 - 5.3 mmol/L    POCT Chloride, Arterial 105 98 - 107 mmol/L    POCT Ionized Calcium, Arterial 1.11 1.10 - 1.33 mmol/L    POCT Glucose, Arterial 156 (H) 74 - 99 mg/dL    POCT Lactate, Arterial 1.7 0.4 - 2.0 mmol/L    POCT Base Excess, Arterial -1.2 -2.0 - 3.0 mmol/L    POCT HCO3 Calculated, Arterial 25.7 22.0 - 26.0 mmol/L    POCT Hemoglobin, Arterial 12.5 (L) 13.5 - 17.5 g/dL    POCT Anion Gap, Arterial 10 10 - 25 mmo/L    Patient Temperature 37.0 degrees Celsius    FiO2 60 %   Blood Gas Arterial Full Panel Unsolicited   Result Value Ref Range    POCT pH, Arterial 7.30 (L) 7.38 - 7.42 pH    POCT pCO2, Arterial 51 (H) 38 - 42 mm Hg    POCT pO2, Arterial 172 (H) 85 - 95 mm Hg    POCT SO2, Arterial 99 94 - 100 %    POCT Oxy Hemoglobin, Arterial 97.4 94.0 - 98.0 %    POCT Hematocrit Calculated, Arterial 38.0 (L) 41.0 - 52.0 %    POCT Sodium, Arterial 135 (L) 136 - 145 mmol/L    POCT Potassium, Arterial 4.2 3.5 - 5.3 mmol/L    POCT Chloride, Arterial 105 98 - 107 mmol/L    POCT Ionized Calcium, Arterial 1.10 1.10 - 1.33 mmol/L    POCT Glucose, Arterial 151 (H) 74 - 99 mg/dL    POCT Lactate, Arterial 1.7 0.4 - 2.0 mmol/L    POCT Base Excess, Arterial -1.9 -2.0 - 3.0 mmol/L    POCT HCO3 Calculated, Arterial 25.1 22.0 - 26.0 mmol/L    POCT Hemoglobin, Arterial 12.7 (L) 13.5 - 17.5 g/dL    POCT Anion Gap, Arterial 9 (L) 10 - 25 mmo/L    Patient Temperature 37.0 degrees Celsius    FiO2 60 %   Coox Panel, Arterial Unsolicited   Result Value Ref Range    POCT Hemoglobin, Arterial 12.7 (L) 13.5 - 17.5 g/dL    POCT Oxy  Hemoglobin, Arterial 97.4 94.0 - 98.0 %    POCT Carboxyhemoglobin, Arterial 0.5 %    POCT Methemoglobin, Arterial 0.7 0.0 - 1.5 %    POCT Deoxy Hemoglobin, Arterial 1.4 0.0 - 5.0 %   Blood Gas Arterial Full Panel Unsolicited   Result Value Ref Range    POCT pH, Arterial 7.34 (L) 7.38 - 7.42 pH    POCT pCO2, Arterial 47 (H) 38 - 42 mm Hg    POCT pO2, Arterial 191 (H) 85 - 95 mm Hg    POCT SO2, Arterial 99 94 - 100 %    POCT Oxy Hemoglobin, Arterial 97.7 94.0 - 98.0 %    POCT Hematocrit Calculated, Arterial 37.0 (L) 41.0 - 52.0 %    POCT Sodium, Arterial 136 136 - 145 mmol/L    POCT Potassium, Arterial 4.5 3.5 - 5.3 mmol/L    POCT Chloride, Arterial 105 98 - 107 mmol/L    POCT Ionized Calcium, Arterial 1.09 (L) 1.10 - 1.33 mmol/L    POCT Glucose, Arterial 169 (H) 74 - 99 mg/dL    POCT Lactate, Arterial 1.8 0.4 - 2.0 mmol/L    POCT Base Excess, Arterial -0.8 -2.0 - 3.0 mmol/L    POCT HCO3 Calculated, Arterial 25.4 22.0 - 26.0 mmol/L    POCT Hemoglobin, Arterial 12.2 (L) 13.5 - 17.5 g/dL    POCT Anion Gap, Arterial 10 10 - 25 mmo/L    Patient Temperature 37.0 degrees Celsius    FiO2 60 %   Coox Panel, Arterial Unsolicited   Result Value Ref Range    POCT Hemoglobin, Arterial 12.2 (L) 13.5 - 17.5 g/dL    POCT Oxy Hemoglobin, Arterial 97.7 94.0 - 98.0 %    POCT Carboxyhemoglobin, Arterial 0.6 %    POCT Methemoglobin, Arterial 0.5 0.0 - 1.5 %    POCT Deoxy Hemoglobin, Arterial 1.3 0.0 - 5.0 %   Blood Gas Arterial Full Panel Unsolicited   Result Value Ref Range    POCT pH, Arterial 7.32 (L) 7.38 - 7.42 pH    POCT pCO2, Arterial 50 (H) 38 - 42 mm Hg    POCT pO2, Arterial 282 (H) 85 - 95 mm Hg    POCT SO2, Arterial 98 94 - 100 %    POCT Oxy Hemoglobin, Arterial 97.4 94.0 - 98.0 %    POCT Hematocrit Calculated, Arterial 31.0 (L) 41.0 - 52.0 %    POCT Sodium, Arterial 137 136 - 145 mmol/L    POCT Potassium, Arterial 4.8 3.5 - 5.3 mmol/L    POCT Chloride, Arterial 107 98 - 107 mmol/L    POCT Ionized Calcium, Arterial 1.04 (L)  1.10 - 1.33 mmol/L    POCT Glucose, Arterial 155 (H) 74 - 99 mg/dL    POCT Lactate, Arterial 2.1 (H) 0.4 - 2.0 mmol/L    POCT Base Excess, Arterial -0.7 -2.0 - 3.0 mmol/L    POCT HCO3 Calculated, Arterial 25.8 22.0 - 26.0 mmol/L    POCT Hemoglobin, Arterial 10.4 (L) 13.5 - 17.5 g/dL    POCT Anion Gap, Arterial 9 (L) 10 - 25 mmo/L    Patient Temperature 37.0 degrees Celsius    FiO2 80 %   Coox Panel, Arterial Unsolicited   Result Value Ref Range    POCT Hemoglobin, Arterial 10.4 (L) 13.5 - 17.5 g/dL    POCT Oxy Hemoglobin, Arterial 97.4 94.0 - 98.0 %    POCT Carboxyhemoglobin, Arterial 0.2 %    POCT Methemoglobin, Arterial 0.6 0.0 - 1.5 %    POCT Deoxy Hemoglobin, Arterial 1.8 0.0 - 5.0 %   Blood Gas Venous Full Panel Unsolicited   Result Value Ref Range    POCT pH, Venous 7.29 (L) 7.33 - 7.43 pH    POCT pCO2, Venous 46 41 - 51 mm Hg    POCT pO2, Venous 47 (H) 35 - 45 mm Hg    POCT SO2, Venous 79 (H) 45 - 75 %    POCT Oxy Hemoglobin, Venous 77.8 (H) 45.0 - 75.0 %    POCT Hematocrit Calculated, Venous 26.0 (L) 41.0 - 52.0 %    POCT Sodium, Venous 137 136 - 145 mmol/L    POCT Potassium, Venous 5.0 3.5 - 5.3 mmol/L    POCT Chloride, Venous 104 98 - 107 mmol/L    POCT Ionized Calicum, Venous 0.90 (L) 1.10 - 1.33 mmol/L    POCT Glucose, Venous 135 (H) 74 - 99 mg/dL    POCT Lactate, Venous 1.9 0.4 - 2.0 mmol/L    POCT Base Excess, Venous -4.3 (L) -2.0 - 3.0 mmol/L    POCT HCO3 Calculated, Venous 22.1 22.0 - 26.0 mmol/L    POCT Hemoglobin, Venous 8.6 (L) 13.5 - 17.5 g/dL    POCT Anion Gap, Venous 16.0 10.0 - 25.0 mmol/L    Patient Temperature 37.0 degrees Celsius    FiO2 80 %   Coox Panel, Venous Unsolicited   Result Value Ref Range    POCT Carboxyhemoglobin, Venous 1.4 %    POCT Methemoglobin, Venous 0.4 0.0 - 1.5 %   Blood Gas Arterial Full Panel Unsolicited   Result Value Ref Range    POCT pH, Arterial 7.38 7.38 - 7.42 pH    POCT pCO2, Arterial 45 (H) 38 - 42 mm Hg    POCT pO2, Arterial 276 (H) 85 - 95 mm Hg    POCT SO2,  Arterial 99 94 - 100 %    POCT Oxy Hemoglobin, Arterial 97.4 94.0 - 98.0 %    POCT Hematocrit Calculated, Arterial 31.0 (L) 41.0 - 52.0 %    POCT Sodium, Arterial 138 136 - 145 mmol/L    POCT Potassium, Arterial 4.8 3.5 - 5.3 mmol/L    POCT Chloride, Arterial 107 98 - 107 mmol/L    POCT Ionized Calcium, Arterial 1.04 (L) 1.10 - 1.33 mmol/L    POCT Glucose, Arterial 168 (H) 74 - 99 mg/dL    POCT Lactate, Arterial 2.4 (H) 0.4 - 2.0 mmol/L    POCT Base Excess, Arterial 1.2 -2.0 - 3.0 mmol/L    POCT HCO3 Calculated, Arterial 26.6 (H) 22.0 - 26.0 mmol/L    POCT Hemoglobin, Arterial 10.4 (L) 13.5 - 17.5 g/dL    POCT Anion Gap, Arterial 9 (L) 10 - 25 mmo/L    Patient Temperature 37.0 degrees Celsius    FiO2 80 %   Coox Panel, Arterial Unsolicited   Result Value Ref Range    POCT Hemoglobin, Arterial 10.4 (L) 13.5 - 17.5 g/dL    POCT Oxy Hemoglobin, Arterial 97.4 94.0 - 98.0 %    POCT Carboxyhemoglobin, Arterial 0.6 %    POCT Methemoglobin, Arterial 0.9 0.0 - 1.5 %    POCT Deoxy Hemoglobin, Arterial 1.1 0.0 - 5.0 %   Blood Gas Arterial Full Panel Unsolicited   Result Value Ref Range    POCT pH, Arterial 7.31 (L) 7.38 - 7.42 pH    POCT pCO2, Arterial 46 (H) 38 - 42 mm Hg    POCT pO2, Arterial 248 (H) 85 - 95 mm Hg    POCT SO2, Arterial 99 94 - 100 %    POCT Oxy Hemoglobin, Arterial 97.8 94.0 - 98.0 %    POCT Hematocrit Calculated, Arterial 31.0 (L) 41.0 - 52.0 %    POCT Sodium, Arterial 138 136 - 145 mmol/L    POCT Potassium, Arterial 5.4 (H) 3.5 - 5.3 mmol/L    POCT Chloride, Arterial 107 98 - 107 mmol/L    POCT Ionized Calcium, Arterial 1.13 1.10 - 1.33 mmol/L    POCT Glucose, Arterial 179 (H) 74 - 99 mg/dL    POCT Lactate, Arterial 2.9 (H) 0.4 - 2.0 mmol/L    POCT Base Excess, Arterial -3.1 (L) -2.0 - 3.0 mmol/L    POCT HCO3 Calculated, Arterial 23.2 22.0 - 26.0 mmol/L    POCT Hemoglobin, Arterial 10.2 (L) 13.5 - 17.5 g/dL    POCT Anion Gap, Arterial 13 10 - 25 mmo/L    Patient Temperature 37.0 degrees Celsius    FiO2 80  %   Coox Panel, Arterial Unsolicited   Result Value Ref Range    POCT Hemoglobin, Arterial 10.2 (L) 13.5 - 17.5 g/dL    POCT Oxy Hemoglobin, Arterial 97.8 94.0 - 98.0 %    POCT Carboxyhemoglobin, Arterial 0.7 %    POCT Methemoglobin, Arterial 0.5 0.0 - 1.5 %    POCT Deoxy Hemoglobin, Arterial 1.0 0.0 - 5.0 %   Blood Gas Arterial Full Panel Unsolicited   Result Value Ref Range    POCT pH, Arterial 7.32 (L) 7.38 - 7.42 pH    POCT pCO2, Arterial 46 (H) 38 - 42 mm Hg    POCT pO2, Arterial 95 85 - 95 mm Hg    POCT SO2, Arterial 98 94 - 100 %    POCT Oxy Hemoglobin, Arterial 96.1 94.0 - 98.0 %    POCT Hematocrit Calculated, Arterial 33.0 (L) 41.0 - 52.0 %    POCT Sodium, Arterial 139 136 - 145 mmol/L    POCT Potassium, Arterial 4.0 3.5 - 5.3 mmol/L    POCT Chloride, Arterial 108 (H) 98 - 107 mmol/L    POCT Ionized Calcium, Arterial 1.23 1.10 - 1.33 mmol/L    POCT Glucose, Arterial 181 (H) 74 - 99 mg/dL    POCT Lactate, Arterial 3.7 (H) 0.4 - 2.0 mmol/L    POCT Base Excess, Arterial -2.5 (L) -2.0 - 3.0 mmol/L    POCT HCO3 Calculated, Arterial 23.7 22.0 - 26.0 mmol/L    POCT Hemoglobin, Arterial 11.1 (L) 13.5 - 17.5 g/dL    POCT Anion Gap, Arterial 11 10 - 25 mmo/L    Patient Temperature 37.0 degrees Celsius    FiO2 50 %   Coox Panel, Arterial Unsolicited   Result Value Ref Range    POCT Hemoglobin, Arterial 11.1 (L) 13.5 - 17.5 g/dL    POCT Oxy Hemoglobin, Arterial 96.1 94.0 - 98.0 %    POCT Carboxyhemoglobin, Arterial 0.7 %    POCT Methemoglobin, Arterial 0.7 0.0 - 1.5 %    POCT Deoxy Hemoglobin, Arterial 2.5 0.0 - 5.0 %   Blood Gas Arterial Full Panel Unsolicited   Result Value Ref Range    POCT pH, Arterial 7.32 (L) 7.38 - 7.42 pH    POCT pCO2, Arterial 44 (H) 38 - 42 mm Hg    POCT pO2, Arterial 192 (H) 85 - 95 mm Hg    POCT SO2, Arterial 99 94 - 100 %    POCT Oxy Hemoglobin, Arterial 97.5 94.0 - 98.0 %    POCT Hematocrit Calculated, Arterial 32.0 (L) 41.0 - 52.0 %    POCT Sodium, Arterial 139 136 - 145 mmol/L    POCT  Potassium, Arterial 3.6 3.5 - 5.3 mmol/L    POCT Chloride, Arterial 108 (H) 98 - 107 mmol/L    POCT Ionized Calcium, Arterial 1.10 1.10 - 1.33 mmol/L    POCT Glucose, Arterial 162 (H) 74 - 99 mg/dL    POCT Lactate, Arterial 4.7 (HH) 0.4 - 2.0 mmol/L    POCT Base Excess, Arterial -3.4 (L) -2.0 - 3.0 mmol/L    POCT HCO3 Calculated, Arterial 22.7 22.0 - 26.0 mmol/L    POCT Hemoglobin, Arterial 10.8 (L) 13.5 - 17.5 g/dL    POCT Anion Gap, Arterial 12 10 - 25 mmo/L    Patient Temperature 37.0 degrees Celsius    FiO2 100 %   Coox Panel, Arterial Unsolicited   Result Value Ref Range    POCT Hemoglobin, Arterial 10.8 (L) 13.5 - 17.5 g/dL    POCT Oxy Hemoglobin, Arterial 97.5 94.0 - 98.0 %    POCT Carboxyhemoglobin, Arterial 1.2 %    POCT Methemoglobin, Arterial 0.6 0.0 - 1.5 %    POCT Deoxy Hemoglobin, Arterial 0.8 0.0 - 5.0 %   Blood Gas Arterial Full Panel Unsolicited   Result Value Ref Range    POCT pH, Arterial 7.30 (L) 7.38 - 7.42 pH    POCT pCO2, Arterial 45 (H) 38 - 42 mm Hg    POCT pO2, Arterial 223 (H) 85 - 95 mm Hg    POCT SO2, Arterial 99 94 - 100 %    POCT Oxy Hemoglobin, Arterial 97.4 94.0 - 98.0 %    POCT Hematocrit Calculated, Arterial 32.0 (L) 41.0 - 52.0 %    POCT Sodium, Arterial 140 136 - 145 mmol/L    POCT Potassium, Arterial 3.4 (L) 3.5 - 5.3 mmol/L    POCT Chloride, Arterial 107 98 - 107 mmol/L    POCT Ionized Calcium, Arterial 1.04 (L) 1.10 - 1.33 mmol/L    POCT Glucose, Arterial 167 (H) 74 - 99 mg/dL    POCT Lactate, Arterial 5.6 (HH) 0.4 - 2.0 mmol/L    POCT Base Excess, Arterial -4.2 (L) -2.0 - 3.0 mmol/L    POCT HCO3 Calculated, Arterial 22.1 22.0 - 26.0 mmol/L    POCT Hemoglobin, Arterial 10.5 (L) 13.5 - 17.5 g/dL    POCT Anion Gap, Arterial 14 10 - 25 mmo/L    Patient Temperature 37.0 degrees Celsius    FiO2 100 %   Coox Panel, Arterial Unsolicited   Result Value Ref Range    POCT Hemoglobin, Arterial 10.5 (L) 13.5 - 17.5 g/dL    POCT Oxy Hemoglobin, Arterial 97.4 94.0 - 98.0 %    POCT  Carboxyhemoglobin, Arterial 0.9 %    POCT Methemoglobin, Arterial 0.7 0.0 - 1.5 %    POCT Deoxy Hemoglobin, Arterial 1.0 0.0 - 5.0 %   Blood Gas Arterial Full Panel Unsolicited   Result Value Ref Range    POCT pH, Arterial 7.30 (L) 7.38 - 7.42 pH    POCT pCO2, Arterial 42 38 - 42 mm Hg    POCT pO2, Arterial 100 (H) 85 - 95 mm Hg    POCT SO2, Arterial 98 94 - 100 %    POCT Oxy Hemoglobin, Arterial 96.3 94.0 - 98.0 %    POCT Hematocrit Calculated, Arterial 32.0 (L) 41.0 - 52.0 %    POCT Sodium, Arterial 141 136 - 145 mmol/L    POCT Potassium, Arterial 3.3 (L) 3.5 - 5.3 mmol/L    POCT Chloride, Arterial 107 98 - 107 mmol/L    POCT Ionized Calcium, Arterial 1.06 (L) 1.10 - 1.33 mmol/L    POCT Glucose, Arterial 165 (H) 74 - 99 mg/dL    POCT Lactate, Arterial 6.8 (HH) 0.4 - 2.0 mmol/L    POCT Base Excess, Arterial -5.4 (L) -2.0 - 3.0 mmol/L    POCT HCO3 Calculated, Arterial 20.7 (L) 22.0 - 26.0 mmol/L    POCT Hemoglobin, Arterial 10.5 (L) 13.5 - 17.5 g/dL    POCT Anion Gap, Arterial 17 10 - 25 mmo/L    Patient Temperature 37.0 degrees Celsius   Calcium, Ionized   Result Value Ref Range    POCT Calcium, Ionized 1.04 (L) 1.1 - 1.33 mmol/L   CBC   Result Value Ref Range    WBC 31.6 (H) 4.4 - 11.3 x10*3/uL    nRBC 0.0 0.0 - 0.0 /100 WBCs    RBC 3.42 (L) 4.50 - 5.90 x10*6/uL    Hemoglobin 10.2 (L) 13.5 - 17.5 g/dL    Hematocrit 30.1 (L) 41.0 - 52.0 %    MCV 88 80 - 100 fL    MCH 29.8 26.0 - 34.0 pg    MCHC 33.9 32.0 - 36.0 g/dL    RDW 13.2 11.5 - 14.5 %    Platelets 182 150 - 450 x10*3/uL    MPV 10.9 7.5 - 11.5 fL   Coagulation Screen   Result Value Ref Range    Protime 13.5 (H) 9.8 - 12.8 seconds    INR 1.2 (H) 0.9 - 1.1    aPTT 26 (L) 27 - 38 seconds   Fibrinogen   Result Value Ref Range    Fibrinogen 222 200 - 400 mg/dL   Magnesium   Result Value Ref Range    Magnesium 2.37 1.60 - 2.40 mg/dL   Renal Function Panel   Result Value Ref Range    Glucose 172 (H) 74 - 99 mg/dL    Sodium 143 136 - 145 mmol/L    Potassium 3.4 (L)  3.5 - 5.3 mmol/L    Chloride 104 98 - 107 mmol/L    Bicarbonate 22 21 - 32 mmol/L    Anion Gap 20 10 - 20 mmol/L    Urea Nitrogen 14 6 - 23 mg/dL    Creatinine 0.98 0.50 - 1.30 mg/dL    eGFR 90 >60 mL/min/1.73m*2    Calcium 8.3 (L) 8.6 - 10.6 mg/dL    Phosphorus 3.5 2.5 - 4.9 mg/dL    Albumin 4.0 3.4 - 5.0 g/dL   Blood Gas Arterial Full Panel Unsolicited   Result Value Ref Range    POCT pH, Arterial 7.29 (L) 7.38 - 7.42 pH    POCT pCO2, Arterial 34 (L) 38 - 42 mm Hg    POCT pO2, Arterial 117 (H) 85 - 95 mm Hg    POCT SO2, Arterial 99 94 - 100 %    POCT Oxy Hemoglobin, Arterial 97.0 94.0 - 98.0 %    POCT Hematocrit Calculated, Arterial 30.0 (L) 41.0 - 52.0 %    POCT Sodium, Arterial 139 136 - 145 mmol/L    POCT Potassium, Arterial 4.7 3.5 - 5.3 mmol/L    POCT Chloride, Arterial 105 98 - 107 mmol/L    POCT Ionized Calcium, Arterial 1.03 (L) 1.10 - 1.33 mmol/L    POCT Glucose, Arterial 417 (H) 74 - 99 mg/dL    POCT Lactate, Arterial 9.6 (HH) 0.4 - 2.0 mmol/L    POCT Base Excess, Arterial -9.4 (L) -2.0 - 3.0 mmol/L    POCT HCO3 Calculated, Arterial 16.3 (L) 22.0 - 26.0 mmol/L    POCT Hemoglobin, Arterial 10.0 (L) 13.5 - 17.5 g/dL    POCT Anion Gap, Arterial 22 10 - 25 mmo/L    Patient Temperature 37.0 degrees Celsius   Blood Gas Arterial Full Panel Unsolicited   Result Value Ref Range    POCT pH, Arterial 7.24 (LL) 7.38 - 7.42 pH    POCT pCO2, Arterial 34 (L) 38 - 42 mm Hg    POCT pO2, Arterial 104 (H) 85 - 95 mm Hg    POCT SO2, Arterial 99 94 - 100 %    POCT Oxy Hemoglobin, Arterial 96.9 94.0 - 98.0 %    POCT Hematocrit Calculated, Arterial 29.0 (L) 41.0 - 52.0 %    POCT Sodium, Arterial 139 136 - 145 mmol/L    POCT Potassium, Arterial 4.1 3.5 - 5.3 mmol/L    POCT Chloride, Arterial 103 98 - 107 mmol/L    POCT Ionized Calcium, Arterial 1.05 (L) 1.10 - 1.33 mmol/L    POCT Glucose, Arterial 405 (H) 74 - 99 mg/dL    POCT Lactate, Arterial 11.2 (HH) 0.4 - 2.0 mmol/L    POCT Base Excess, Arterial -11.8 (L) -2.0 - 3.0 mmol/L     POCT HCO3 Calculated, Arterial 14.6 (L) 22.0 - 26.0 mmol/L    POCT Hemoglobin, Arterial 9.5 (L) 13.5 - 17.5 g/dL    POCT Anion Gap, Arterial 26 (H) 10 - 25 mmo/L    Patient Temperature 37.0 degrees Celsius   Blood Gas Arterial Full Panel Unsolicited   Result Value Ref Range    POCT pH, Arterial 7.24 (LL) 7.38 - 7.42 pH    POCT pCO2, Arterial 31 (L) 38 - 42 mm Hg    POCT pO2, Arterial 110 (H) 85 - 95 mm Hg    POCT SO2, Arterial 99 94 - 100 %    POCT Oxy Hemoglobin, Arterial 97.0 94.0 - 98.0 %    POCT Hematocrit Calculated, Arterial 27.0 (L) 41.0 - 52.0 %    POCT Sodium, Arterial 140 136 - 145 mmol/L    POCT Potassium, Arterial 3.9 3.5 - 5.3 mmol/L    POCT Chloride, Arterial 105 98 - 107 mmol/L    POCT Ionized Calcium, Arterial 1.03 (L) 1.10 - 1.33 mmol/L    POCT Glucose, Arterial 365 (H) 74 - 99 mg/dL    POCT Lactate, Arterial 10.2 (HH) 0.4 - 2.0 mmol/L    POCT Base Excess, Arterial -12.9 (L) -2.0 - 3.0 mmol/L    POCT HCO3 Calculated, Arterial 13.3 (L) 22.0 - 26.0 mmol/L    POCT Hemoglobin, Arterial 9.1 (L) 13.5 - 17.5 g/dL    POCT Anion Gap, Arterial 26 (H) 10 - 25 mmo/L    Patient Temperature 37.0 degrees Celsius   Blood Gas Arterial Full Panel Unsolicited   Result Value Ref Range    POCT pH, Arterial 7.29 (L) 7.38 - 7.42 pH    POCT pCO2, Arterial 29 (L) 38 - 42 mm Hg    POCT pO2, Arterial 115 (H) 85 - 95 mm Hg    POCT SO2, Arterial 99 94 - 100 %    POCT Oxy Hemoglobin, Arterial 97.5 94.0 - 98.0 %    POCT Hematocrit Calculated, Arterial 26.0 (L) 41.0 - 52.0 %    POCT Sodium, Arterial 138 136 - 145 mmol/L    POCT Potassium, Arterial 4.2 3.5 - 5.3 mmol/L    POCT Chloride, Arterial 106 98 - 107 mmol/L    POCT Ionized Calcium, Arterial 1.01 (L) 1.10 - 1.33 mmol/L    POCT Glucose, Arterial 372 (H) 74 - 99 mg/dL    POCT Lactate, Arterial 10.2 (HH) 0.4 - 2.0 mmol/L    POCT Base Excess, Arterial -11.5 (L) -2.0 - 3.0 mmol/L    POCT HCO3 Calculated, Arterial 13.9 (L) 22.0 - 26.0 mmol/L    POCT Hemoglobin, Arterial 8.7 (L)  13.5 - 17.5 g/dL    POCT Anion Gap, Arterial 22 10 - 25 mmo/L    Patient Temperature 37.0 degrees Celsius   Calcium, Ionized   Result Value Ref Range    POCT Calcium, Ionized 1.04 (L) 1.1 - 1.33 mmol/L   CBC   Result Value Ref Range    WBC 21.4 (H) 4.4 - 11.3 x10*3/uL    nRBC 0.0 0.0 - 0.0 /100 WBCs    RBC 3.07 (L) 4.50 - 5.90 x10*6/uL    Hemoglobin 9.3 (L) 13.5 - 17.5 g/dL    Hematocrit 27.7 (L) 41.0 - 52.0 %    MCV 90 80 - 100 fL    MCH 30.3 26.0 - 34.0 pg    MCHC 33.6 32.0 - 36.0 g/dL    RDW 13.4 11.5 - 14.5 %    Platelets 141 (L) 150 - 450 x10*3/uL    MPV 11.3 7.5 - 11.5 fL   Coagulation Screen   Result Value Ref Range    Protime 13.0 (H) 9.8 - 12.8 seconds    INR 1.2 (H) 0.9 - 1.1    aPTT 30 27 - 38 seconds   Fibrinogen   Result Value Ref Range    Fibrinogen 239 200 - 400 mg/dL   Magnesium   Result Value Ref Range    Magnesium 2.14 1.60 - 2.40 mg/dL   Renal Function Panel   Result Value Ref Range    Glucose 374 (H) 74 - 99 mg/dL    Sodium 138 136 - 145 mmol/L    Potassium 4.4 3.5 - 5.3 mmol/L    Chloride 101 98 - 107 mmol/L    Bicarbonate 16 (L) 21 - 32 mmol/L    Anion Gap 25 (H) 10 - 20 mmol/L    Urea Nitrogen 17 6 - 23 mg/dL    Creatinine 1.14 0.50 - 1.30 mg/dL    eGFR 75 >60 mL/min/1.73m*2    Calcium 8.5 (L) 8.6 - 10.6 mg/dL    Phosphorus 2.2 (L) 2.5 - 4.9 mg/dL    Albumin 4.5 3.4 - 5.0 g/dL   POCT GLUCOSE   Result Value Ref Range    POCT Glucose 323 (H) 74 - 99 mg/dL   Blood Gas Arterial Full Panel Unsolicited   Result Value Ref Range    POCT pH, Arterial 7.30 (L) 7.38 - 7.42 pH    POCT pCO2, Arterial 32 (L) 38 - 42 mm Hg    POCT pO2, Arterial 123 (H) 85 - 95 mm Hg    POCT SO2, Arterial 98 94 - 100 %    POCT Oxy Hemoglobin, Arterial 96.9 94.0 - 98.0 %    POCT Hematocrit Calculated, Arterial 26.0 (L) 41.0 - 52.0 %    POCT Sodium, Arterial 139 136 - 145 mmol/L    POCT Potassium, Arterial 4.5 3.5 - 5.3 mmol/L    POCT Chloride, Arterial 105 98 - 107 mmol/L    POCT Ionized Calcium, Arterial 1.09 (L) 1.10 - 1.33  mmol/L    POCT Glucose, Arterial 329 (H) 74 - 99 mg/dL    POCT Lactate, Arterial 8.9 (HH) 0.4 - 2.0 mmol/L    POCT Base Excess, Arterial -9.8 (L) -2.0 - 3.0 mmol/L    POCT HCO3 Calculated, Arterial 15.7 (L) 22.0 - 26.0 mmol/L    POCT Hemoglobin, Arterial 8.8 (L) 13.5 - 17.5 g/dL    POCT Anion Gap, Arterial 23 10 - 25 mmo/L    Patient Temperature 37.0 degrees Celsius   Blood Gas Arterial Full Panel Unsolicited   Result Value Ref Range    POCT pH, Arterial 7.33 (L) 7.38 - 7.42 pH    POCT pCO2, Arterial 34 (L) 38 - 42 mm Hg    POCT pO2, Arterial 108 (H) 85 - 95 mm Hg    POCT SO2, Arterial 99 94 - 100 %    POCT Oxy Hemoglobin, Arterial 97.5 94.0 - 98.0 %    POCT Hematocrit Calculated, Arterial 27.0 (L) 41.0 - 52.0 %    POCT Sodium, Arterial 138 136 - 145 mmol/L    POCT Potassium, Arterial 4.5 3.5 - 5.3 mmol/L    POCT Chloride, Arterial 104 98 - 107 mmol/L    POCT Ionized Calcium, Arterial 1.11 1.10 - 1.33 mmol/L    POCT Glucose, Arterial 272 (H) 74 - 99 mg/dL    POCT Lactate, Arterial 7.0 (HH) 0.4 - 2.0 mmol/L    POCT Base Excess, Arterial -7.3 (L) -2.0 - 3.0 mmol/L    POCT HCO3 Calculated, Arterial 17.9 (L) 22.0 - 26.0 mmol/L    POCT Hemoglobin, Arterial 9.0 (L) 13.5 - 17.5 g/dL    POCT Anion Gap, Arterial 21 10 - 25 mmo/L    Patient Temperature 37.0 degrees Celsius   Blood Gas Arterial Full Panel Unsolicited   Result Value Ref Range    POCT pH, Arterial 7.38 7.38 - 7.42 pH    POCT pCO2, Arterial 31 (L) 38 - 42 mm Hg    POCT pO2, Arterial 101 (H) 85 - 95 mm Hg    POCT SO2, Arterial 98 94 - 100 %    POCT Oxy Hemoglobin, Arterial 96.5 94.0 - 98.0 %    POCT Hematocrit Calculated, Arterial 26.0 (L) 41.0 - 52.0 %    POCT Sodium, Arterial 138 136 - 145 mmol/L    POCT Potassium, Arterial 4.3 3.5 - 5.3 mmol/L    POCT Chloride, Arterial 106 98 - 107 mmol/L    POCT Ionized Calcium, Arterial 1.07 (L) 1.10 - 1.33 mmol/L    POCT Glucose, Arterial 249 (H) 74 - 99 mg/dL    POCT Lactate, Arterial 6.9 (HH) 0.4 - 2.0 mmol/L    POCT Base  Excess, Arterial -6.1 (L) -2.0 - 3.0 mmol/L    POCT HCO3 Calculated, Arterial 18.3 (L) 22.0 - 26.0 mmol/L    POCT Hemoglobin, Arterial 8.8 (L) 13.5 - 17.5 g/dL    POCT Anion Gap, Arterial 18 10 - 25 mmo/L    Patient Temperature 37.0 degrees Celsius   Blood Gas Arterial Full Panel Unsolicited   Result Value Ref Range    POCT pH, Arterial 7.38 7.38 - 7.42 pH    POCT pCO2, Arterial 35 (L) 38 - 42 mm Hg    POCT pO2, Arterial 107 (H) 85 - 95 mm Hg    POCT SO2, Arterial 98 94 - 100 %    POCT Oxy Hemoglobin, Arterial 96.8 94.0 - 98.0 %    POCT Hematocrit Calculated, Arterial 27.0 (L) 41.0 - 52.0 %    POCT Sodium, Arterial 137 136 - 145 mmol/L    POCT Potassium, Arterial 4.5 3.5 - 5.3 mmol/L    POCT Chloride, Arterial 105 98 - 107 mmol/L    POCT Ionized Calcium, Arterial 1.12 1.10 - 1.33 mmol/L    POCT Glucose, Arterial 218 (H) 74 - 99 mg/dL    POCT Lactate, Arterial 4.9 (HH) 0.4 - 2.0 mmol/L    POCT Base Excess, Arterial -3.9 (L) -2.0 - 3.0 mmol/L    POCT HCO3 Calculated, Arterial 20.7 (L) 22.0 - 26.0 mmol/L    POCT Hemoglobin, Arterial 9.0 (L) 13.5 - 17.5 g/dL    POCT Anion Gap, Arterial 16 10 - 25 mmo/L    Patient Temperature 37.0 degrees Celsius    FiO2 40 %   POCT GLUCOSE   Result Value Ref Range    POCT Glucose 183 (H) 74 - 99 mg/dL   Blood Gas Arterial Full Panel Unsolicited   Result Value Ref Range    POCT pH, Arterial 7.42 7.38 - 7.42 pH    POCT pCO2, Arterial 35 (L) 38 - 42 mm Hg    POCT pO2, Arterial 86 85 - 95 mm Hg    POCT SO2, Arterial 98 94 - 100 %    POCT Oxy Hemoglobin, Arterial 96.8 94.0 - 98.0 %    POCT Hematocrit Calculated, Arterial 26.0 (L) 41.0 - 52.0 %    POCT Sodium, Arterial 138 136 - 145 mmol/L    POCT Potassium, Arterial 4.3 3.5 - 5.3 mmol/L    POCT Chloride, Arterial 107 98 - 107 mmol/L    POCT Ionized Calcium, Arterial 1.11 1.10 - 1.33 mmol/L    POCT Glucose, Arterial 174 (H) 74 - 99 mg/dL    POCT Lactate, Arterial 3.4 (H) 0.4 - 2.0 mmol/L    POCT Base Excess, Arterial -1.5 -2.0 - 3.0 mmol/L     POCT HCO3 Calculated, Arterial 22.7 22.0 - 26.0 mmol/L    POCT Hemoglobin, Arterial 8.7 (L) 13.5 - 17.5 g/dL    POCT Anion Gap, Arterial 13 10 - 25 mmo/L    Patient Temperature 37.0 degrees Celsius   POCT GLUCOSE   Result Value Ref Range    POCT Glucose 169 (H) 74 - 99 mg/dL        XR chest 1 view    Result Date: 10/6/2023  Interpreted By:  Jay Perry and Stephens Katherine STUDY: XR CHEST 1 VIEW;  10/6/2023 8:54 pm   INDICATION: Signs/Symptoms:Post op cardiac surgery.   COMPARISON: Chest radiograph and CT chest 09/25/2023   ACCESSION NUMBER(S): [id number]   ORDERING CLINICIAN: KRISTI ALBRIGHT   FINDINGS: AP radiograph of the chest was provided. Status post interval median sternotomy with placement of multiple medical devices. For example, Endotracheal tube is now seen with its tip projecting approximately 7.7 cm above the pascual, at the level of medial clavicles; consider slight advancement. Right IJ central venous catheter with tip overlying the lower SVC. Interval placement of bibasilar chest tubes and a mediastinal drain.   CARDIOMEDIASTINAL SILHOUETTE: Cardiomediastinal silhouette is stable in size and configuration.   LUNGS: Low lung volumes with bronchovascular crowding. Accounting for this, there is no edmar pulmonary edema. There is mild bandlike bibasilar postoperative atelectasis, left more than right. There is no sizable pleural effusion or pneumothorax.   ABDOMEN: Questionable linear lucency under the right hemidiaphragm which may represent trace amount of postoperative pneumoperitoneum or may be projectional in nature.   BONES: No acute osseous changes.       1. New postsurgical changes and medical devices as above. Consider slight advancement of endotracheal tube. 2. New mild bandlike bibasilar opacities, likely representing postoperative atelectasis, left more than right. 3. Questionable trace lucency under the right hemidiaphragm which may represent trace amount of postoperative pneumoperitoneum  or may be projectional in nature. Attention on follow-up imaging is recommended.   I personally reviewed the images/study and I agree with the findings as stated. This study was interpreted at [facility], [place], [place].   MACRO: None   Signed by: Jay Perry 10/6/2023 9:02 PM Dictation workstation:   [id number]         Assessment/Plan   Principal Problem:    Coronary artery disease of native artery of native heart with stable angina pectoris (CMS/HCC)    Assessment: Tim Kenney is a 58-year-old male with a hx of IDDM, LENNY on CPAP, HTN, CAD, MI. He was asymptomatic until a month ago when he started having chest pain. He was seen by his cardiologist, cardiac cath demonstrated LAD  severe circumflex and severe RCA disease. He was sent to Dr. Lianne Portillo for evaluation in consideration of bypass surgery. Patient is s/p CABG x4 with Dr Lianne Portillo.       Plan:  NEURO:  Patient is intubated and sedated on propofol infusion. Acute post operative pain.   -->  - Serial neuro and pain assessments   - NMB reversed, propfol weaned, on CPAP trial   - PRN oxycodone  - PRN dilaudid for pain   - PT Consult, OOB to chair as tolerated, chair position if not tolerated   - CAM ICU score qshift  - Sleep/wake cycle hygiene     CV:  Patient has a history of HTN, CAD, MI. Is now status post CABG x4 Pre/Post EF: WNL. Arrived to CTICU on on Epi .02, levo .04, nitro at 5. V epicardial wires set off-->  - epi now off, levo now off  - Maintain goal MAP 65-90  - Volume resuscitate as clinically indicated  - Plavix POD2, if needed  - Start atorvastatin 80mg PO  - Hold home metop, lisinopril, imdur     PULM:  Hx of LENNY on CPAP. Currently intubated on ventilator. Chest tubes bilateral pleural and 1 med.-->  - F/u post op CXR  - CPAP trial, planning to extubate  - Wean FiO2 maintaining SpO2 >92%.   - IS q1h and OOB to chair when extubated  - Chest tubes to wall suction.     GI:  OG in place.-->  - Continue PPI  until extubated  - NPO, will perform bedside swallow eval post extubation   - Colace/senna BID and miralax BID     :  No history of renal disease, baseline creatinine .9. Creatinine stable post-op. Hutchinson in place and making adequate UOP. -->  - Continue hutchinson catheter for strict I/Os.  - Goal UOP 0.5ml/kg/hr  - RFP this PM and as clinically indicated  - Replete electrolytes per CTICU protocol     ENDO:  PMH of IDDM on insulin pump and semaglutide. A1c: 8-->  - Maintain BG <180, insulin per CTICU protocol  - Insulin gtt started at 30u, weaning as able   - endocrine consult for insulin regimen given prior insulin pump   - glargine 40u daily, frequent glu checks, continue to wean gtt as able, per endocrine      HEME:  Acute blood loss anemia and thrombocytopenia.-->    - Monitor drain output volume and characteristics  - CBC, coags, and fibrinogen post op and as clinically indicated  - s/p ASA 6hrs post-op for CABG  - If CABG is 2/2 STEMI/unstable angina, will receive Plavix POD2  - SQH   - SCDs for DVT prophylaxis.  - Last type and screen: 10/6     ID:  Afebrile, no current indications of infection. MRSA neg.-->  - Trend temp q4h  - Periop cefazolin x 48hrs     Skin:  No active skin issues.  - preventative Mepilex dressings in place on sacrum and heels  - change preventative Mepilex weekly or more frequently as indicated (when moist/soiled)   - every shift skin assessment per nursing and weekly ICU skin rounds  - moisture barrier to be applied with gwen care  - active skin problems addressed with nursing on daily rounds     Proph:  SCDs  PPI     G:  Lines  Right IJ MAC w [place] placed 10/6  Left brachial a-line placed 10/6  PIVs  Hutchinson     F: Family: will update at bedside as able.     A,B,C,D,E,F,G: reviewed        Dispo: continue CTICU care      Jaydon Sandy, DO  Emergency Medicine PGY-2

## 2023-10-07 NOTE — ANESTHESIA POSTPROCEDURE EVALUATION
Patient: Tim Kenney    Procedure Summary       Date: 10/06/23 Room / Location: [facility] OR 21 / [facility] OR    Anesthesia Start: 1249 Anesthesia Stop: 1959    Procedure: CABGX LIMA, DINORAH, Radial (Chest) Diagnosis:       Atherosclerotic heart disease of native coronary artery without angina pectoris      (Atherosclerotic heart disease of native coronary artery without angina pectoris [I25.10])    Surgeons: Behzad Portillo MD Responsible Provider: Gracy Caro MD    Anesthesia Type: general ASA Status: 3            Anesthesia Type: general    Vitals Value Taken Time   /68 10/06/23 2000   Temp 36.2 10/06/23 2000   Pulse 107 10/06/23 1959   Resp 24 10/06/23 1959   SpO2 98 10/06/23 2000   Vitals shown include unvalidated device data.    Anesthesia Post Evaluation    Patient location during evaluation: ICU  Patient participation: complete - patient cannot participate  Post-procedure mental status: sedated.  Pain management: adequate  Multimodal analgesia pain management approach  Cardiovascular status: acceptable  Respiratory status: acceptable, ETT and ventilator  Hydration status: euvolemic        There were no known notable events for this encounter.

## 2023-10-07 NOTE — CONSULTS
Inpatient consult to Endocrinology  Consult performed by: Wendy Mcguire PA-C  Consult ordered by: LISA Zafar-CNP          Reason For Consult  T2DM s/p CABG x4, high insulin gtt requirements, on insulin pump at home    History Of Present Illness  Tim Kenney is a 58-year-old male with a hx of IDDM, LENNY on CPAP, HTN, CAD, MI. He was asymptomatic until a month ago when he started having chest pain. He was seen by his cardiologist, cardiac cath demonstrated LAD  severe circumflex and severe RCA disease. He was sent to Dr. Lianne Portillo for evaluation in consideration of bypass surgery. Patient is s/p CABG x4 with Dr Lianne Portillo.     Diabetes History  Outpatient provider for endocrine care Dr. Rosenthal at [facility], date of last visit 8/23/23  Initial diabetes diagnosis was made 20 years ago. Pt has been on insulin pump (medtronic 770G) for past 2 years  Known complications due to diabetes include: peripheral neuropathy, CAD s/p CABG x4, obesity, and hyperglycemia    Home Management  Pt states he uses medtronic 770g insulin pump with guardian sensor, as well as rybelsus 10mg daily. Pt has 780G software at home but has not yet upgraded his pump.     Pt states he changes his site every 3 days, and fills his pump with approx 300u of insulin.  CareEverywhere reviewed, no insulin pump settings listed in notes from [facility]  Pt does not know his insulin pump settings, does not have his pump with him, and states he does not remember how much insulin he took when he was on injections.    Pt with high insulin requirements on gtt s/p CABG d/t post-operative stress state and pain causing hyperglycemia. Pt was also on pressors and multiple medications mixed with dextrose, insulin gtt able to titrate down quickly once those meds were discontinued. Will transition pt to subcutaneous insulin based on insulin gtt requirments as well as weight based insulin dosing. Pt agreeable to plan      Results from Most Recent  A1C  POC HEMOGLOBIN A1c   Date/Time Value Ref Range Status   03/21/2023 09:26 AM 7.4 (A) 4.2 - 6.5 % Final     Hemoglobin A1C   Date/Time Value Ref Range Status   09/25/2023 11:14 AM 8.0 (A) % Final     Comment:          Diagnosis of Diabetes-Adults   Non-Diabetic: < or = 5.6%   Increased risk for developing diabetes: 5.7-6.4%   Diagnostic of diabetes: > or = 6.5%  .       Monitoring of Diabetes                Age (y)     Therapeutic Goal (%)   Adults:          >18           <7.0   Pediatrics:    13-18           <7.5                   7-12           <8.0                   0- 6            7.5-8.5   American Diabetes Association. Diabetes Care 33(S1), Jan 2010.          Diabetes Problem List Entries with Dates  Problem List:  2023-02: Type 2 diabetes mellitus without complication, without long-  term current use of insulin (CMS/[facility])      History of DKA with Dates:   This is not able to automated, and will cause the clinician to review the entire history of patient. Solved, need to look at History of Diabetes Problem List. Includes resolved entries. Clinician can look above and enter the count.            Scheduled medications  acetaminophen, 650 mg, oral, q6h  aspirin, 81 mg, oral, Daily  atorvastatin, 80 mg, oral, Daily  ceFAZolin, 3 g, intravenous, q8h  docusate sodium, 100 mg, oral, BID  heparin (porcine), 5,000 Units, subcutaneous, q8h  insulin glargine, 40 Units, subcutaneous, q24h  pantoprazole, 40 mg, intravenous, Daily before breakfast  polyethylene glycol, 17 g, oral, Daily      Continuous medications  insulin regular infusion for cardiac surgery, 0-30 Units/hr, Last Rate: 20 Units/hr (10/07/23 0923)  lactated Ringer's, 5 mL/hr, Last Rate: 5 mL/hr (10/07/23 1400)  nitroglycerin, 0-200 mcg/min, Last Rate: 5 mcg/min (10/07/23 1400)      PRN medications  PRN medications: calcium gluconate, calcium gluconate, dextrose 10 % in water (D10W), dextrose **OR** glucagon, dextrose, glucagon, HYDROmorphone, magnesium  sulfate, magnesium sulfate, naloxone, oxyCODONE, oxygen, potassium chloride, potassium chloride      NOTE: Anything under this line is for testing purposes only       Past Medical History  He has a past medical history of Angina pectoris (CMS/[facility]), C. difficile colitis, Coronary artery disease, Diabetes mellitus (CMS/HCC), Hyperlipidemia, Hypertension, Joint pain, Old myocardial infarction, and Sleep apnea.    Surgical History  He has a past surgical history that includes Other surgical history (04/13/2022).     Social History  He reports that he has never smoked. He has never been exposed to tobacco smoke. He has never used smokeless tobacco. He reports that he does not drink alcohol and does not use drugs.    Family History  Family History   Problem Relation Name Age of Onset    Other (Cardiac Disorder) Mother      Breast cancer Mother      Cancer Father          Allergies  Iodinated contrast media    Review of Systems   Constitutional:  Positive for fatigue. Negative for activity change, appetite change, diaphoresis and unexpected weight change.   HENT:  Negative for congestion, sore throat and trouble swallowing.    Eyes:  Negative for pain, redness and visual disturbance.   Respiratory:  Negative for cough, chest tightness and shortness of breath.    Cardiovascular:  Positive for chest pain. Negative for palpitations and leg swelling.   Gastrointestinal:  Negative for abdominal pain, diarrhea, nausea and vomiting.   Endocrine: Negative for cold intolerance, heat intolerance, polydipsia, polyphagia and polyuria.   Genitourinary:  Negative for dysuria, frequency and urgency.   Musculoskeletal:  Negative for gait problem and joint swelling.   Skin:  Negative for pallor and rash.   Allergic/Immunologic: Negative for immunocompromised state.   Neurological:  Negative for dizziness, light-headedness, numbness and headaches.   Hematological:  Does not bruise/bleed easily.   Psychiatric/Behavioral:  Negative for  "agitation, behavioral problems and confusion.    All other systems reviewed and are negative.       Physical Exam  Vitals reviewed.   Constitutional:       General: He is not in acute distress.     Appearance: Normal appearance. He is obese.   HENT:      Head: Normocephalic and atraumatic.      Nose: Nose normal.      Mouth/Throat:      Mouth: Mucous membranes are moist.   Eyes:      Extraocular Movements: Extraocular movements intact.      Conjunctiva/sclera: Conjunctivae normal.      Pupils: Pupils are equal, round, and reactive to light.   Cardiovascular:      Pulses: Normal pulses.   Pulmonary:      Effort: Pulmonary effort is normal. No respiratory distress.   Abdominal:      General: Abdomen is flat. There is no distension.   Musculoskeletal:         General: Normal range of motion.   Skin:     General: Skin is warm and dry.      Findings: No rash.      Comments: Chest tube in place   Neurological:      Mental Status: He is alert and oriented to person, place, and time.   Psychiatric:         Mood and Affect: Mood normal.         Behavior: Behavior normal.          ROS, PMH, FH/SH, surgical history and allergies have been reviewed.    Last Recorded Vitals  Blood pressure 126/71, pulse (!) 111, temperature 37.7 °C (99.9 °F), temperature source Bladder, resp. rate 21, height 1.88 m (6' 2\"), weight 118 kg (260 lb 2.3 oz), SpO2 97 %.    Relevant Results  Results from last 7 days   Lab Units 10/07/23  1009 10/07/23  0431 10/07/23  0316 10/06/23  2027   POCT GLUCOSE mg/dL 183* 323*  --   --    GLUCOSE mg/dL  --   --  374* 172*     Continuous Glucose Monitoring  Patient-reported glucose 134 (134)   High Limit     Low Limit     Rotation Due Date     Sensor Site Assessment Clean   Comments       Lab Results   Component Value Date    HGBA1C 8.0 (A) 09/25/2023    HGBA1C 7.6 (A) 04/16/2023    HGBA1C 7.4 (A) 03/21/2023     10/07/2023    K 4.1 10/07/2023     10/07/2023    CO2 23 10/07/2023    BUN 16 10/07/2023 "    CREATININE 0.94 10/07/2023    CALCIUM 8.5 (L) 10/07/2023    ALBUMIN 4.4 10/07/2023    PROT 8.1 09/25/2023    BILITOT 0.4 09/25/2023    ALKPHOS 98 09/25/2023    ALT 41 09/25/2023    AST 25 09/25/2023    GLUCOSE 128 (H) 10/07/2023    CHOL 146 03/21/2023    TRIG 411 (H) 03/21/2023    HDL 27.6 (A) 03/21/2023    BHYDRXBUT 0.50 (H) 04/16/2023          Assessment/Plan   Principal Problem:    Coronary artery disease of native artery of native heart with stable angina pectoris (CMS/HCC)    Tim Kenney is a 58-year-old male with a hx of IDDM, LENNY on CPAP, HTN, CAD, MI. He was asymptomatic until a month ago when he started having chest pain. He was seen by his cardiologist, cardiac cath demonstrated LAD  severe circumflex and severe RCA disease. He was sent to Dr. Lianne Portillo for evaluation in consideration of bypass surgery. Patient is s/p CABG x4 with Dr Lianne Portillo.     Diabetes History  Outpatient provider for endocrine care Dr. Rosenthal at [facility], date of last visit 8/23/23  Initial diabetes diagnosis was made 20 years ago. Pt has been on insulin pump (medtronic 770G) for past 2 years  Known complications due to diabetes include: peripheral neuropathy, CAD s/p CABG x4, obesity, and hyperglycemia    Home Management  Pt states he uses medtronic 770g insulin pump with guardian sensor, as well as rybelsus 10mg daily. Pt has 780G software at home but has not yet upgraded his pump.     Pt states he changes his site every 3 days, and fills his pump with approx 300u of insulin.  CareEverywhere reviewed, no insulin pump settings listed in notes from [facility]  Pt does not know his insulin pump settings, does not have his pump with him, and states he does not remember how much insulin he took when he was on injections.    Pt with high insulin requirements on gtt s/p CABG d/t post-operative stress state and pain causing hyperglycemia. Pt was also on pressors and multiple medications mixed with dextrose, insulin gtt able to  titrate down quickly once those meds were discontinued. Will transition pt to subcutaneous insulin based on insulin gtt requirments as well as weight based insulin dosing. Pt agreeable to plan       PLAN-  - Goal BG <180  - start glargine 40u once daily, this will aid in reducing high insulin gtt requirements. Continue insulin gtt until requirements are less than 5u / h.     -once insulin gtt is <5u/h, may stop insulin gtt. At that time, please start lispro corrective scale #3 q4h    Insulin gtt should not be stopped prior than 2h after glargine dose given.      -Accuchecks q1h on insulin gtt, q4h on subcutaneous regimen  -Hypoglycemia protocol  -Will continue to follow and titrate insulin accordingly    Pt may expect to restart insulin pump once the following conditions are met:  1. Pt has pump and all supplies for pump brought in from home  2. Pt signed insulin pump consent form  3. Pt is out of the ICU    I spent 60 minutes in the professional and overall care of this patient.      Wendy Mcguire PA-C

## 2023-10-08 ENCOUNTER — APPOINTMENT (OUTPATIENT)
Dept: RADIOLOGY | Facility: HOSPITAL | Age: 58
DRG: 236 | End: 2023-10-08
Payer: COMMERCIAL

## 2023-10-08 LAB
ACT BLD: 101 SEC (ref 96–152)
ACT BLD: 415 SEC (ref 96–152)
ACT BLD: 418 SEC (ref 96–152)
ACT BLD: 421 SEC (ref 96–152)
ACT BLD: 452 SEC (ref 96–152)
ACT BLD: 461 SEC (ref 96–152)
ACT BLD: 463 SEC (ref 96–152)
ACT BLD: 92 SEC (ref 96–152)
ALBUMIN SERPL BCP-MCNC: 4 G/DL (ref 3.4–5)
ALBUMIN SERPL BCP-MCNC: 4.1 G/DL (ref 3.4–5)
ANION GAP BLDA CALCULATED.4IONS-SCNC: 10 MMO/L (ref 10–25)
ANION GAP SERPL CALC-SCNC: 14 MMOL/L (ref 10–20)
ANION GAP SERPL CALC-SCNC: 15 MMOL/L (ref 10–20)
APTT PPP: 26 SECONDS (ref 27–38)
APTT PPP: 26 SECONDS (ref 27–38)
BASE EXCESS BLDA CALC-SCNC: 0.1 MMOL/L (ref -2–3)
BODY TEMPERATURE: 37 DEGREES CELSIUS
BUN SERPL-MCNC: 19 MG/DL (ref 6–23)
BUN SERPL-MCNC: 21 MG/DL (ref 6–23)
CA-I BLD-SCNC: 1.07 MMOL/L (ref 1.1–1.33)
CA-I BLD-SCNC: 1.09 MMOL/L (ref 1.1–1.33)
CA-I BLDA-SCNC: 1.12 MMOL/L (ref 1.1–1.33)
CALCIUM SERPL-MCNC: 8.4 MG/DL (ref 8.6–10.6)
CALCIUM SERPL-MCNC: 8.6 MG/DL (ref 8.6–10.6)
CHLORIDE BLDA-SCNC: 107 MMOL/L (ref 98–107)
CHLORIDE SERPL-SCNC: 101 MMOL/L (ref 98–107)
CHLORIDE SERPL-SCNC: 106 MMOL/L (ref 98–107)
CO2 SERPL-SCNC: 25 MMOL/L (ref 21–32)
CO2 SERPL-SCNC: 25 MMOL/L (ref 21–32)
CREAT SERPL-MCNC: 0.92 MG/DL (ref 0.5–1.3)
CREAT SERPL-MCNC: 0.96 MG/DL (ref 0.5–1.3)
ERYTHROCYTE [DISTWIDTH] IN BLOOD BY AUTOMATED COUNT: 13.6 % (ref 11.5–14.5)
ERYTHROCYTE [DISTWIDTH] IN BLOOD BY AUTOMATED COUNT: 13.8 % (ref 11.5–14.5)
FIBRINOGEN PPP-MCNC: 416 MG/DL (ref 200–400)
GFR SERPL CREATININE-BSD FRML MDRD: >90 ML/MIN/1.73M*2
GFR SERPL CREATININE-BSD FRML MDRD: >90 ML/MIN/1.73M*2
GLUCOSE BLD MANUAL STRIP-MCNC: 118 MG/DL (ref 74–99)
GLUCOSE BLD MANUAL STRIP-MCNC: 137 MG/DL (ref 74–99)
GLUCOSE BLD MANUAL STRIP-MCNC: 217 MG/DL (ref 74–99)
GLUCOSE BLD MANUAL STRIP-MCNC: 229 MG/DL (ref 74–99)
GLUCOSE BLD MANUAL STRIP-MCNC: 231 MG/DL (ref 74–99)
GLUCOSE BLD MANUAL STRIP-MCNC: 263 MG/DL (ref 74–99)
GLUCOSE BLDA-MCNC: 118 MG/DL (ref 74–99)
GLUCOSE SERPL-MCNC: 126 MG/DL (ref 74–99)
GLUCOSE SERPL-MCNC: 248 MG/DL (ref 74–99)
HCO3 BLDA-SCNC: 24.7 MMOL/L (ref 22–26)
HCT VFR BLD AUTO: 24.9 % (ref 41–52)
HCT VFR BLD AUTO: 25.1 % (ref 41–52)
HCT VFR BLD EST: 25 % (ref 41–52)
HGB BLD-MCNC: 8.4 G/DL (ref 13.5–17.5)
HGB BLD-MCNC: 8.4 G/DL (ref 13.5–17.5)
HGB BLDA-MCNC: 8.2 G/DL (ref 13.5–17.5)
INR PPP: 1.1 (ref 0.9–1.1)
INR PPP: 1.1 (ref 0.9–1.1)
LACTATE BLDA-SCNC: 1.8 MMOL/L (ref 0.4–2)
MAGNESIUM SERPL-MCNC: 2.14 MG/DL (ref 1.6–2.4)
MAGNESIUM SERPL-MCNC: 2.21 MG/DL (ref 1.6–2.4)
MCH RBC QN AUTO: 30.2 PG (ref 26–34)
MCH RBC QN AUTO: 30.4 PG (ref 26–34)
MCHC RBC AUTO-ENTMCNC: 33.5 G/DL (ref 32–36)
MCHC RBC AUTO-ENTMCNC: 33.7 G/DL (ref 32–36)
MCV RBC AUTO: 90 FL (ref 80–100)
MCV RBC AUTO: 91 FL (ref 80–100)
NRBC BLD-RTO: 0 /100 WBCS (ref 0–0)
NRBC BLD-RTO: 0 /100 WBCS (ref 0–0)
OXYHGB MFR BLDA: 97.7 % (ref 94–98)
PCO2 BLDA: 39 MM HG (ref 38–42)
PH BLDA: 7.41 PH (ref 7.38–7.42)
PHOSPHATE SERPL-MCNC: 2.1 MG/DL (ref 2.5–4.9)
PHOSPHATE SERPL-MCNC: 2.7 MG/DL (ref 2.5–4.9)
PLATELET # BLD AUTO: 76 X10*3/UL (ref 150–450)
PLATELET # BLD AUTO: 90 X10*3/UL (ref 150–450)
PMV BLD AUTO: 11.4 FL (ref 7.5–11.5)
PMV BLD AUTO: 11.5 FL (ref 7.5–11.5)
PO2 BLDA: 117 MM HG (ref 85–95)
POTASSIUM BLDA-SCNC: 4.5 MMOL/L (ref 3.5–5.3)
POTASSIUM SERPL-SCNC: 4.1 MMOL/L (ref 3.5–5.3)
POTASSIUM SERPL-SCNC: 4.5 MMOL/L (ref 3.5–5.3)
PROTHROMBIN TIME: 12.7 SECONDS (ref 9.8–12.8)
PROTHROMBIN TIME: 12.7 SECONDS (ref 9.8–12.8)
RBC # BLD AUTO: 2.76 X10*6/UL (ref 4.5–5.9)
RBC # BLD AUTO: 2.78 X10*6/UL (ref 4.5–5.9)
SAO2 % BLDA: 99 % (ref 94–100)
SODIUM BLDA-SCNC: 137 MMOL/L (ref 136–145)
SODIUM SERPL-SCNC: 137 MMOL/L (ref 136–145)
SODIUM SERPL-SCNC: 140 MMOL/L (ref 136–145)
WBC # BLD AUTO: 15.6 X10*3/UL (ref 4.4–11.3)
WBC # BLD AUTO: 17.3 X10*3/UL (ref 4.4–11.3)

## 2023-10-08 PROCEDURE — 2500000002 HC RX 250 W HCPCS SELF ADMINISTERED DRUGS (ALT 637 FOR MEDICARE OP, ALT 636 FOR OP/ED)

## 2023-10-08 PROCEDURE — 80069 RENAL FUNCTION PANEL: CPT

## 2023-10-08 PROCEDURE — 96372 THER/PROPH/DIAG INJ SC/IM: CPT

## 2023-10-08 PROCEDURE — 99233 SBSQ HOSP IP/OBS HIGH 50: CPT

## 2023-10-08 PROCEDURE — 2500000001 HC RX 250 WO HCPCS SELF ADMINISTERED DRUGS (ALT 637 FOR MEDICARE OP): Performed by: NURSE PRACTITIONER

## 2023-10-08 PROCEDURE — 2500000004 HC RX 250 GENERAL PHARMACY W/ HCPCS (ALT 636 FOR OP/ED): Performed by: NURSE PRACTITIONER

## 2023-10-08 PROCEDURE — 2500000002 HC RX 250 W HCPCS SELF ADMINISTERED DRUGS (ALT 637 FOR MEDICARE OP, ALT 636 FOR OP/ED): Performed by: NURSE PRACTITIONER

## 2023-10-08 PROCEDURE — 96372 THER/PROPH/DIAG INJ SC/IM: CPT | Performed by: STUDENT IN AN ORGANIZED HEALTH CARE EDUCATION/TRAINING PROGRAM

## 2023-10-08 PROCEDURE — 94660 CPAP INITIATION&MGMT: CPT

## 2023-10-08 PROCEDURE — 2500000005 HC RX 250 GENERAL PHARMACY W/O HCPCS

## 2023-10-08 PROCEDURE — 2020000001 HC ICU ROOM DAILY

## 2023-10-08 PROCEDURE — 82330 ASSAY OF CALCIUM: CPT

## 2023-10-08 PROCEDURE — 37799 UNLISTED PX VASCULAR SURGERY: CPT | Performed by: STUDENT IN AN ORGANIZED HEALTH CARE EDUCATION/TRAINING PROGRAM

## 2023-10-08 PROCEDURE — 2500000004 HC RX 250 GENERAL PHARMACY W/ HCPCS (ALT 636 FOR OP/ED): Performed by: STUDENT IN AN ORGANIZED HEALTH CARE EDUCATION/TRAINING PROGRAM

## 2023-10-08 PROCEDURE — 82947 ASSAY GLUCOSE BLOOD QUANT: CPT

## 2023-10-08 PROCEDURE — 85610 PROTHROMBIN TIME: CPT | Performed by: STUDENT IN AN ORGANIZED HEALTH CARE EDUCATION/TRAINING PROGRAM

## 2023-10-08 PROCEDURE — 2500000002 HC RX 250 W HCPCS SELF ADMINISTERED DRUGS (ALT 637 FOR MEDICARE OP, ALT 636 FOR OP/ED): Performed by: STUDENT IN AN ORGANIZED HEALTH CARE EDUCATION/TRAINING PROGRAM

## 2023-10-08 PROCEDURE — 83735 ASSAY OF MAGNESIUM: CPT | Performed by: STUDENT IN AN ORGANIZED HEALTH CARE EDUCATION/TRAINING PROGRAM

## 2023-10-08 PROCEDURE — 99291 CRITICAL CARE FIRST HOUR: CPT

## 2023-10-08 PROCEDURE — 85027 COMPLETE CBC AUTOMATED: CPT | Performed by: STUDENT IN AN ORGANIZED HEALTH CARE EDUCATION/TRAINING PROGRAM

## 2023-10-08 PROCEDURE — 2500000004 HC RX 250 GENERAL PHARMACY W/ HCPCS (ALT 636 FOR OP/ED)

## 2023-10-08 PROCEDURE — 71045 X-RAY EXAM CHEST 1 VIEW: CPT | Performed by: STUDENT IN AN ORGANIZED HEALTH CARE EDUCATION/TRAINING PROGRAM

## 2023-10-08 PROCEDURE — 82330 ASSAY OF CALCIUM: CPT | Performed by: STUDENT IN AN ORGANIZED HEALTH CARE EDUCATION/TRAINING PROGRAM

## 2023-10-08 PROCEDURE — 96372 THER/PROPH/DIAG INJ SC/IM: CPT | Performed by: NURSE PRACTITIONER

## 2023-10-08 PROCEDURE — 85384 FIBRINOGEN ACTIVITY: CPT | Performed by: STUDENT IN AN ORGANIZED HEALTH CARE EDUCATION/TRAINING PROGRAM

## 2023-10-08 PROCEDURE — 93005 ELECTROCARDIOGRAM TRACING: CPT

## 2023-10-08 PROCEDURE — 2500000001 HC RX 250 WO HCPCS SELF ADMINISTERED DRUGS (ALT 637 FOR MEDICARE OP)

## 2023-10-08 PROCEDURE — 84100 ASSAY OF PHOSPHORUS: CPT | Performed by: STUDENT IN AN ORGANIZED HEALTH CARE EDUCATION/TRAINING PROGRAM

## 2023-10-08 PROCEDURE — 71045 X-RAY EXAM CHEST 1 VIEW: CPT

## 2023-10-08 PROCEDURE — 2500000001 HC RX 250 WO HCPCS SELF ADMINISTERED DRUGS (ALT 637 FOR MEDICARE OP): Performed by: STUDENT IN AN ORGANIZED HEALTH CARE EDUCATION/TRAINING PROGRAM

## 2023-10-08 RX ORDER — DEXTROSE 50 % IN WATER (D50W) INTRAVENOUS SYRINGE
25
Status: DISCONTINUED | OUTPATIENT
Start: 2023-10-08 | End: 2023-10-09

## 2023-10-08 RX ORDER — INSULIN GLARGINE 100 [IU]/ML
60 INJECTION, SOLUTION SUBCUTANEOUS EVERY 24 HOURS
Status: DISCONTINUED | OUTPATIENT
Start: 2023-10-08 | End: 2023-10-10

## 2023-10-08 RX ORDER — DEXTROSE MONOHYDRATE 100 MG/ML
0.3 INJECTION, SOLUTION INTRAVENOUS ONCE AS NEEDED
Status: DISCONTINUED | OUTPATIENT
Start: 2023-10-08 | End: 2023-10-09

## 2023-10-08 RX ORDER — ISOSORBIDE MONONITRATE 30 MG/1
30 TABLET, EXTENDED RELEASE ORAL DAILY
Status: DISCONTINUED | OUTPATIENT
Start: 2023-10-08 | End: 2023-10-12 | Stop reason: HOSPADM

## 2023-10-08 RX ORDER — INSULIN LISPRO 100 [IU]/ML
0-15 INJECTION, SOLUTION INTRAVENOUS; SUBCUTANEOUS
Status: DISCONTINUED | OUTPATIENT
Start: 2023-10-08 | End: 2023-10-08

## 2023-10-08 RX ORDER — FUROSEMIDE 10 MG/ML
20 INJECTION INTRAMUSCULAR; INTRAVENOUS ONCE
Status: COMPLETED | OUTPATIENT
Start: 2023-10-08 | End: 2023-10-08

## 2023-10-08 RX ORDER — INSULIN LISPRO 100 [IU]/ML
5 INJECTION, SOLUTION INTRAVENOUS; SUBCUTANEOUS
Status: DISCONTINUED | OUTPATIENT
Start: 2023-10-08 | End: 2023-10-09

## 2023-10-08 RX ORDER — DIGOXIN 0.25 MG/ML
500 INJECTION INTRAMUSCULAR; INTRAVENOUS ONCE
Status: COMPLETED | OUTPATIENT
Start: 2023-10-08 | End: 2023-10-08

## 2023-10-08 RX ORDER — DIGOXIN 0.25 MG/ML
125 INJECTION INTRAMUSCULAR; INTRAVENOUS DAILY
Status: DISCONTINUED | OUTPATIENT
Start: 2023-10-09 | End: 2023-10-09

## 2023-10-08 RX ORDER — CLOPIDOGREL BISULFATE 75 MG/1
75 TABLET ORAL DAILY
Status: DISCONTINUED | OUTPATIENT
Start: 2023-10-08 | End: 2023-10-12 | Stop reason: HOSPADM

## 2023-10-08 RX ORDER — DIGOXIN 0.25 MG/ML
250 INJECTION INTRAMUSCULAR; INTRAVENOUS ONCE
Status: COMPLETED | OUTPATIENT
Start: 2023-10-08 | End: 2023-10-08

## 2023-10-08 RX ORDER — INSULIN LISPRO 100 [IU]/ML
0-20 INJECTION, SOLUTION INTRAVENOUS; SUBCUTANEOUS
Status: DISCONTINUED | OUTPATIENT
Start: 2023-10-08 | End: 2023-10-08

## 2023-10-08 RX ORDER — INSULIN LISPRO 100 [IU]/ML
0-20 INJECTION, SOLUTION INTRAVENOUS; SUBCUTANEOUS EVERY 4 HOURS
Status: DISCONTINUED | OUTPATIENT
Start: 2023-10-08 | End: 2023-10-10

## 2023-10-08 RX ORDER — DIGOXIN 0.25 MG/ML
INJECTION INTRAMUSCULAR; INTRAVENOUS
Status: COMPLETED
Start: 2023-10-08 | End: 2023-10-08

## 2023-10-08 RX ORDER — DIGOXIN 0.25 MG/ML
INJECTION INTRAMUSCULAR; INTRAVENOUS
Status: DISPENSED
Start: 2023-10-08 | End: 2023-10-09

## 2023-10-08 RX ORDER — METOPROLOL TARTRATE 25 MG/1
12.5 TABLET, FILM COATED ORAL 2 TIMES DAILY
Status: DISCONTINUED | OUTPATIENT
Start: 2023-10-08 | End: 2023-10-08

## 2023-10-08 RX ORDER — METOPROLOL TARTRATE 25 MG/1
25 TABLET, FILM COATED ORAL 2 TIMES DAILY
Status: DISCONTINUED | OUTPATIENT
Start: 2023-10-08 | End: 2023-10-10

## 2023-10-08 RX ADMIN — ACETAMINOPHEN 650 MG: 325 TABLET ORAL at 02:20

## 2023-10-08 RX ADMIN — ACETAMINOPHEN 650 MG: 325 TABLET ORAL at 21:09

## 2023-10-08 RX ADMIN — INSULIN LISPRO 8 UNITS: 100 INJECTION, SOLUTION INTRAVENOUS; SUBCUTANEOUS at 18:39

## 2023-10-08 RX ADMIN — DIGOXIN 250 MCG: 0.25 INJECTION INTRAMUSCULAR; INTRAVENOUS at 16:40

## 2023-10-08 RX ADMIN — CLOPIDOGREL BISULFATE 75 MG: 75 TABLET ORAL at 10:21

## 2023-10-08 RX ADMIN — METOPROLOL TARTRATE 12.5 MG: 25 TABLET, FILM COATED ORAL at 10:21

## 2023-10-08 RX ADMIN — ACETAMINOPHEN 650 MG: 325 TABLET ORAL at 09:01

## 2023-10-08 RX ADMIN — AMIODARONE HYDROCHLORIDE 1 MG/MIN: 1.8 INJECTION, SOLUTION INTRAVENOUS at 10:29

## 2023-10-08 RX ADMIN — INSULIN LISPRO 8 UNITS: 100 INJECTION, SOLUTION INTRAVENOUS; SUBCUTANEOUS at 21:09

## 2023-10-08 RX ADMIN — HEPARIN SODIUM 5000 UNITS: 5000 INJECTION INTRAVENOUS; SUBCUTANEOUS at 02:20

## 2023-10-08 RX ADMIN — METOPROLOL TARTRATE 25 MG: 25 TABLET, FILM COATED ORAL at 21:09

## 2023-10-08 RX ADMIN — INSULIN GLARGINE 60 UNITS: 100 INJECTION, SOLUTION SUBCUTANEOUS at 11:50

## 2023-10-08 RX ADMIN — DIGOXIN 500 MCG: 0.25 INJECTION INTRAMUSCULAR; INTRAVENOUS at 11:55

## 2023-10-08 RX ADMIN — AMIODARONE HYDROCHLORIDE 150 MG: 1.5 INJECTION, SOLUTION INTRAVENOUS at 10:45

## 2023-10-08 RX ADMIN — ISOSORBIDE MONONITRATE 30 MG: 30 TABLET, EXTENDED RELEASE ORAL at 10:20

## 2023-10-08 RX ADMIN — DOCUSATE SODIUM 100 MG: 100 CAPSULE, LIQUID FILLED ORAL at 09:03

## 2023-10-08 RX ADMIN — HEPARIN SODIUM 5000 UNITS: 5000 INJECTION INTRAVENOUS; SUBCUTANEOUS at 09:02

## 2023-10-08 RX ADMIN — POLYETHYLENE GLYCOL 3350 17 G: 17 POWDER, FOR SOLUTION ORAL at 09:02

## 2023-10-08 RX ADMIN — CALCIUM GLUCONATE 1 G: 20 INJECTION, SOLUTION INTRAVENOUS at 05:39

## 2023-10-08 RX ADMIN — AMIODARONE HYDROCHLORIDE 150 MG: 1.5 INJECTION, SOLUTION INTRAVENOUS at 10:16

## 2023-10-08 RX ADMIN — INSULIN LISPRO 5 UNITS: 100 INJECTION, SOLUTION INTRAVENOUS; SUBCUTANEOUS at 18:44

## 2023-10-08 RX ADMIN — INSULIN LISPRO 9 UNITS: 100 INJECTION, SOLUTION INTRAVENOUS; SUBCUTANEOUS at 11:48

## 2023-10-08 RX ADMIN — AMIODARONE HYDROCHLORIDE 1 MG/MIN: 1.8 INJECTION, SOLUTION INTRAVENOUS at 15:15

## 2023-10-08 RX ADMIN — FUROSEMIDE 20 MG: 10 INJECTION, SOLUTION INTRAVENOUS at 10:21

## 2023-10-08 RX ADMIN — Medication: at 08:00

## 2023-10-08 RX ADMIN — CALCIUM GLUCONATE 1 G: 20 INJECTION, SOLUTION INTRAVENOUS at 16:39

## 2023-10-08 RX ADMIN — ACETAMINOPHEN 650 MG: 325 TABLET ORAL at 16:38

## 2023-10-08 RX ADMIN — ASPIRIN 81 MG CHEWABLE TABLET 81 MG: 81 TABLET CHEWABLE at 09:01

## 2023-10-08 RX ADMIN — OXYCODONE HYDROCHLORIDE 5 MG: 5 TABLET ORAL at 09:00

## 2023-10-08 RX ADMIN — INSULIN LISPRO 6 UNITS: 100 INJECTION, SOLUTION INTRAVENOUS; SUBCUTANEOUS at 09:05

## 2023-10-08 RX ADMIN — POTASSIUM PHOSPHATE, MONOBASIC POTASSIUM PHOSPHATE, DIBASIC 15 MMOL: 224; 236 INJECTION, SOLUTION, CONCENTRATE INTRAVENOUS at 18:49

## 2023-10-08 RX ADMIN — OXYCODONE HYDROCHLORIDE 5 MG: 5 TABLET ORAL at 02:20

## 2023-10-08 RX ADMIN — HEPARIN SODIUM 5000 UNITS: 5000 INJECTION INTRAVENOUS; SUBCUTANEOUS at 16:48

## 2023-10-08 RX ADMIN — ATORVASTATIN CALCIUM 80 MG: 80 TABLET, FILM COATED ORAL at 09:01

## 2023-10-08 ASSESSMENT — PAIN SCALES - GENERAL
PAINLEVEL_OUTOF10: 0 - NO PAIN
PAINLEVEL_OUTOF10: 0 - NO PAIN
PAINLEVEL_OUTOF10: 5 - MODERATE PAIN
PAINLEVEL_OUTOF10: 0 - NO PAIN
PAINLEVEL_OUTOF10: 2
PAINLEVEL_OUTOF10: 6
PAINLEVEL_OUTOF10: 0 - NO PAIN

## 2023-10-08 ASSESSMENT — PAIN - FUNCTIONAL ASSESSMENT
PAIN_FUNCTIONAL_ASSESSMENT: 0-10

## 2023-10-08 NOTE — PROGRESS NOTES
"Tim Kenney is a 58 y.o. male on day 2 of admission presenting with Coronary artery disease of native artery of native heart with stable angina pectoris (CMS/HCC).    Subjective   Off insulin gtt over night, transitioned to sliding scale. On CPAP over night for LENNY. Pain relatively well controlled. Overall progressing well.     Late morning: patient went into atrial fibrillation with RVR. Blood pressure stable. Patient relatively asymptomatic. Amiodarone bolus given x2 and gtt started. Patient continued going in and out of Afib, digoxin load begun. K and Mg normal on AM labs.       Objective     Physical Exam  Vitals reviewed.   Constitutional:       General: He is not in acute distress.     Appearance: He is not toxic-appearing.   HENT:      Head: Normocephalic and atraumatic.      Mouth/Throat:      Mouth: Mucous membranes are moist.   Eyes:      General: No scleral icterus.     Extraocular Movements: Extraocular movements intact.      Conjunctiva/sclera: Conjunctivae normal.   Cardiovascular:      Rate and Rhythm: Normal rate and regular rhythm.      Pulses: Normal pulses.      Heart sounds: Normal heart sounds. No murmur heard.  Pulmonary:      Effort: Pulmonary effort is normal.      Breath sounds: Rhonchi (mild) present. No wheezing.   Abdominal:      General: Abdomen is flat. There is no distension.      Palpations: Abdomen is soft. There is no mass.      Tenderness: There is no abdominal tenderness. There is no guarding or rebound.   Skin:     General: Skin is warm and dry.      Findings: No lesion or rash.   Neurological:      General: No focal deficit present.      Mental Status: He is alert and oriented to person, place, and time.      Sensory: Sensation is intact.      Motor: Motor function is intact.       Last Recorded Vitals  Blood pressure 126/71, pulse 90, temperature 36.7 °C (98.1 °F), resp. rate (!) 0, height 1.88 m (6' 2\"), weight 118 kg (260 lb 2.3 oz), SpO2 97 %.  Intake/Output last 3 " Shifts:  I/O last 3 completed shifts:  In: 8207 (69.6 mL/kg) [P.O.:240; I.V.:1768 (15 mL/kg); Blood:749; IV Piggyback:5450]  Out: 4100 (34.7 mL/kg) [Urine:2850 (0.7 mL/kg/hr); Chest Tube:1250]  Weight: 118 kg     Relevant Results     Scheduled medications  acetaminophen, 650 mg, oral, q6h  aspirin, 81 mg, oral, Daily  atorvastatin, 80 mg, oral, Daily  docusate sodium, 100 mg, oral, BID  heparin (porcine), 5,000 Units, subcutaneous, q8h  insulin glargine, 40 Units, subcutaneous, q24h  insulin lispro, 0-15 Units, subcutaneous, TID with meals  oxygen, , inhalation, Continuous - Inhalation  pantoprazole, 40 mg, intravenous, Daily before breakfast  polyethylene glycol, 17 g, oral, Daily      Continuous medications  lactated Ringer's, 5 mL/hr, Last Rate: 5 mL/hr (10/08/23 0600)  nitroglycerin, 0-200 mcg/min, Last Rate: 5 mcg/min (10/08/23 0600)      PRN medications  PRN medications: calcium gluconate, calcium gluconate, dextrose 10 % in water (D10W), dextrose **OR** glucagon, dextrose, glucagon, HYDROmorphone, magnesium sulfate, magnesium sulfate, naloxone, oxyCODONE, oxygen, potassium chloride, potassium chloride    Results for orders placed or performed during the hospital encounter of 10/06/23 (from the past 24 hour(s))   Blood Gas Arterial Full Panel Unsolicited   Result Value Ref Range    POCT pH, Arterial 7.38 7.38 - 7.42 pH    POCT pCO2, Arterial 31 (L) 38 - 42 mm Hg    POCT pO2, Arterial 101 (H) 85 - 95 mm Hg    POCT SO2, Arterial 98 94 - 100 %    POCT Oxy Hemoglobin, Arterial 96.5 94.0 - 98.0 %    POCT Hematocrit Calculated, Arterial 26.0 (L) 41.0 - 52.0 %    POCT Sodium, Arterial 138 136 - 145 mmol/L    POCT Potassium, Arterial 4.3 3.5 - 5.3 mmol/L    POCT Chloride, Arterial 106 98 - 107 mmol/L    POCT Ionized Calcium, Arterial 1.07 (L) 1.10 - 1.33 mmol/L    POCT Glucose, Arterial 249 (H) 74 - 99 mg/dL    POCT Lactate, Arterial 6.9 (HH) 0.4 - 2.0 mmol/L    POCT Base Excess, Arterial -6.1 (L) -2.0 - 3.0 mmol/L     POCT HCO3 Calculated, Arterial 18.3 (L) 22.0 - 26.0 mmol/L    POCT Hemoglobin, Arterial 8.8 (L) 13.5 - 17.5 g/dL    POCT Anion Gap, Arterial 18 10 - 25 mmo/L    Patient Temperature 37.0 degrees Celsius   Blood Gas Arterial Full Panel Unsolicited   Result Value Ref Range    POCT pH, Arterial 7.38 7.38 - 7.42 pH    POCT pCO2, Arterial 35 (L) 38 - 42 mm Hg    POCT pO2, Arterial 107 (H) 85 - 95 mm Hg    POCT SO2, Arterial 98 94 - 100 %    POCT Oxy Hemoglobin, Arterial 96.8 94.0 - 98.0 %    POCT Hematocrit Calculated, Arterial 27.0 (L) 41.0 - 52.0 %    POCT Sodium, Arterial 137 136 - 145 mmol/L    POCT Potassium, Arterial 4.5 3.5 - 5.3 mmol/L    POCT Chloride, Arterial 105 98 - 107 mmol/L    POCT Ionized Calcium, Arterial 1.12 1.10 - 1.33 mmol/L    POCT Glucose, Arterial 218 (H) 74 - 99 mg/dL    POCT Lactate, Arterial 4.9 (HH) 0.4 - 2.0 mmol/L    POCT Base Excess, Arterial -3.9 (L) -2.0 - 3.0 mmol/L    POCT HCO3 Calculated, Arterial 20.7 (L) 22.0 - 26.0 mmol/L    POCT Hemoglobin, Arterial 9.0 (L) 13.5 - 17.5 g/dL    POCT Anion Gap, Arterial 16 10 - 25 mmo/L    Patient Temperature 37.0 degrees Celsius    FiO2 40 %   POCT GLUCOSE   Result Value Ref Range    POCT Glucose 183 (H) 74 - 99 mg/dL   Blood Gas Arterial Full Panel Unsolicited   Result Value Ref Range    POCT pH, Arterial 7.42 7.38 - 7.42 pH    POCT pCO2, Arterial 35 (L) 38 - 42 mm Hg    POCT pO2, Arterial 86 85 - 95 mm Hg    POCT SO2, Arterial 98 94 - 100 %    POCT Oxy Hemoglobin, Arterial 96.8 94.0 - 98.0 %    POCT Hematocrit Calculated, Arterial 26.0 (L) 41.0 - 52.0 %    POCT Sodium, Arterial 138 136 - 145 mmol/L    POCT Potassium, Arterial 4.3 3.5 - 5.3 mmol/L    POCT Chloride, Arterial 107 98 - 107 mmol/L    POCT Ionized Calcium, Arterial 1.11 1.10 - 1.33 mmol/L    POCT Glucose, Arterial 174 (H) 74 - 99 mg/dL    POCT Lactate, Arterial 3.4 (H) 0.4 - 2.0 mmol/L    POCT Base Excess, Arterial -1.5 -2.0 - 3.0 mmol/L    POCT HCO3 Calculated, Arterial 22.7 22.0 -  26.0 mmol/L    POCT Hemoglobin, Arterial 8.7 (L) 13.5 - 17.5 g/dL    POCT Anion Gap, Arterial 13 10 - 25 mmo/L    Patient Temperature 37.0 degrees Celsius   POCT GLUCOSE   Result Value Ref Range    POCT Glucose 169 (H) 74 - 99 mg/dL   Calcium, Ionized   Result Value Ref Range    POCT Calcium, Ionized 1.11 1.1 - 1.33 mmol/L   CBC   Result Value Ref Range    WBC 14.7 (H) 4.4 - 11.3 x10*3/uL    nRBC 0.0 0.0 - 0.0 /100 WBCs    RBC 2.89 (L) 4.50 - 5.90 x10*6/uL    Hemoglobin 8.6 (L) 13.5 - 17.5 g/dL    Hematocrit 25.2 (L) 41.0 - 52.0 %    MCV 87 80 - 100 fL    MCH 29.8 26.0 - 34.0 pg    MCHC 34.1 32.0 - 36.0 g/dL    RDW 13.2 11.5 - 14.5 %    Platelets 100 (L) 150 - 450 x10*3/uL    MPV 11.3 7.5 - 11.5 fL   Magnesium   Result Value Ref Range    Magnesium 2.02 1.60 - 2.40 mg/dL   Renal Function Panel   Result Value Ref Range    Glucose 128 (H) 74 - 99 mg/dL    Sodium 141 136 - 145 mmol/L    Potassium 4.1 3.5 - 5.3 mmol/L    Chloride 105 98 - 107 mmol/L    Bicarbonate 23 21 - 32 mmol/L    Anion Gap 17 10 - 20 mmol/L    Urea Nitrogen 16 6 - 23 mg/dL    Creatinine 0.94 0.50 - 1.30 mg/dL    eGFR >90 >60 mL/min/1.73m*2    Calcium 8.5 (L) 8.6 - 10.6 mg/dL    Phosphorus 2.5 2.5 - 4.9 mg/dL    Albumin 4.4 3.4 - 5.0 g/dL   POCT GLUCOSE   Result Value Ref Range    POCT Glucose 141 (H) 74 - 99 mg/dL   POCT GLUCOSE   Result Value Ref Range    POCT Glucose 144 (H) 74 - 99 mg/dL   POCT GLUCOSE   Result Value Ref Range    POCT Glucose 129 (H) 74 - 99 mg/dL   POCT GLUCOSE   Result Value Ref Range    POCT Glucose 149 (H) 74 - 99 mg/dL   POCT GLUCOSE   Result Value Ref Range    POCT Glucose 153 (H) 74 - 99 mg/dL   Blood Gas Arterial Full Panel Unsolicited   Result Value Ref Range    POCT pH, Arterial 7.41 7.38 - 7.42 pH    POCT pCO2, Arterial 40 38 - 42 mm Hg    POCT pO2, Arterial 93 85 - 95 mm Hg    POCT SO2, Arterial 98 94 - 100 %    POCT Oxy Hemoglobin, Arterial 96.0 94.0 - 98.0 %    POCT Hematocrit Calculated, Arterial 25.0 (L) 41.0 -  52.0 %    POCT Sodium, Arterial 137 136 - 145 mmol/L    POCT Potassium, Arterial 4.7 3.5 - 5.3 mmol/L    POCT Chloride, Arterial 108 (H) 98 - 107 mmol/L    POCT Ionized Calcium, Arterial 1.12 1.10 - 1.33 mmol/L    POCT Glucose, Arterial 138 (H) 74 - 99 mg/dL    POCT Lactate, Arterial 1.8 0.4 - 2.0 mmol/L    POCT Base Excess, Arterial 0.7 -2.0 - 3.0 mmol/L    POCT HCO3 Calculated, Arterial 25.4 22.0 - 26.0 mmol/L    POCT Hemoglobin, Arterial 8.4 (L) 13.5 - 17.5 g/dL    POCT Anion Gap, Arterial 8 (L) 10 - 25 mmo/L    Patient Temperature 37.0 degrees Celsius   POCT GLUCOSE   Result Value Ref Range    POCT Glucose 133 (H) 74 - 99 mg/dL   POCT GLUCOSE   Result Value Ref Range    POCT Glucose 116 (H) 74 - 99 mg/dL   POCT GLUCOSE   Result Value Ref Range    POCT Glucose 118 (H) 74 - 99 mg/dL   Calcium, Ionized   Result Value Ref Range    POCT Calcium, Ionized 1.09 (L) 1.1 - 1.33 mmol/L   CBC   Result Value Ref Range    WBC 15.6 (H) 4.4 - 11.3 x10*3/uL    nRBC 0.0 0.0 - 0.0 /100 WBCs    RBC 2.78 (L) 4.50 - 5.90 x10*6/uL    Hemoglobin 8.4 (L) 13.5 - 17.5 g/dL    Hematocrit 24.9 (L) 41.0 - 52.0 %    MCV 90 80 - 100 fL    MCH 30.2 26.0 - 34.0 pg    MCHC 33.7 32.0 - 36.0 g/dL    RDW 13.6 11.5 - 14.5 %    Platelets 76 (L) 150 - 450 x10*3/uL    MPV 11.4 7.5 - 11.5 fL   Coagulation Screen   Result Value Ref Range    Protime 12.7 9.8 - 12.8 seconds    INR 1.1 0.9 - 1.1    aPTT 26 (L) 27 - 38 seconds   Fibrinogen   Result Value Ref Range    Fibrinogen 416 (H) 200 - 400 mg/dL   Magnesium   Result Value Ref Range    Magnesium 2.21 1.60 - 2.40 mg/dL   Renal Function Panel   Result Value Ref Range    Glucose 126 (H) 74 - 99 mg/dL    Sodium 140 136 - 145 mmol/L    Potassium 4.5 3.5 - 5.3 mmol/L    Chloride 106 98 - 107 mmol/L    Bicarbonate 25 21 - 32 mmol/L    Anion Gap 14 10 - 20 mmol/L    Urea Nitrogen 19 6 - 23 mg/dL    Creatinine 0.96 0.50 - 1.30 mg/dL    eGFR >90 >60 mL/min/1.73m*2    Calcium 8.6 8.6 - 10.6 mg/dL    Phosphorus 2.7  2.5 - 4.9 mg/dL    Albumin 4.1 3.4 - 5.0 g/dL   Blood Gas Arterial Full Panel Unsolicited   Result Value Ref Range    POCT pH, Arterial 7.41 7.38 - 7.42 pH    POCT pCO2, Arterial 39 38 - 42 mm Hg    POCT pO2, Arterial 117 (H) 85 - 95 mm Hg    POCT SO2, Arterial 99 94 - 100 %    POCT Oxy Hemoglobin, Arterial 97.7 94.0 - 98.0 %    POCT Hematocrit Calculated, Arterial 25.0 (L) 41.0 - 52.0 %    POCT Sodium, Arterial 137 136 - 145 mmol/L    POCT Potassium, Arterial 4.5 3.5 - 5.3 mmol/L    POCT Chloride, Arterial 107 98 - 107 mmol/L    POCT Ionized Calcium, Arterial 1.12 1.10 - 1.33 mmol/L    POCT Glucose, Arterial 118 (H) 74 - 99 mg/dL    POCT Lactate, Arterial 1.8 0.4 - 2.0 mmol/L    POCT Base Excess, Arterial 0.1 -2.0 - 3.0 mmol/L    POCT HCO3 Calculated, Arterial 24.7 22.0 - 26.0 mmol/L    POCT Hemoglobin, Arterial 8.2 (L) 13.5 - 17.5 g/dL    POCT Anion Gap, Arterial 10 10 - 25 mmo/L    Patient Temperature 37.0 degrees Celsius   POCT GLUCOSE   Result Value Ref Range    POCT Glucose 137 (H) 74 - 99 mg/dL        XR chest 1 view    Result Date: 10/6/2023  Interpreted By:  Jay Perry  and Faheem Meraz STUDY: XR CHEST 1 VIEW;  10/6/2023 8:54 pm   INDICATION: Signs/Symptoms:Post op cardiac surgery.   COMPARISON: Chest radiograph and CT chest 09/25/2023   ACCESSION NUMBER(S): [id number]   ORDERING CLINICIAN: KRISTI ALBRIGHT   FINDINGS: AP radiograph of the chest was provided. Status post interval median sternotomy with placement of multiple medical devices. For example, Endotracheal tube is now seen with its tip projecting approximately 7.7 cm above the pascual, at the level of medial clavicles; consider slight advancement. Right IJ central venous catheter with tip overlying the lower SVC. Interval placement of bibasilar chest tubes and a mediastinal drain.   CARDIOMEDIASTINAL SILHOUETTE: Cardiomediastinal silhouette is stable in size and configuration.   LUNGS: Low lung volumes with bronchovascular crowding.  Accounting for this, there is no edmar pulmonary edema. There is mild bandlike bibasilar postoperative atelectasis, left more than right. There is no sizable pleural effusion or pneumothorax.   ABDOMEN: Questionable linear lucency under the right hemidiaphragm which may represent trace amount of postoperative pneumoperitoneum or may be projectional in nature.   BONES: No acute osseous changes.       1. New postsurgical changes and medical devices as above. Consider slight advancement of endotracheal tube. 2. New mild bandlike bibasilar opacities, likely representing postoperative atelectasis, left more than right. 3. Questionable trace lucency under the right hemidiaphragm which may represent trace amount of postoperative pneumoperitoneum or may be projectional in nature. Attention on follow-up imaging is recommended.   I personally reviewed the images/study and I agree with the findings as stated. This study was interpreted at [place], [place].   MACRO: None   Signed by: Jay Perry 10/6/2023 9:02 PM Dictation workstation:   [id number]         Assessment/Plan   Principal Problem:    Coronary artery disease of native artery of native heart with stable angina pectoris (CMS/HCC)    Assessment: Tim Kenney is a 58-year-old male with a hx of IDDM, LENNY on CPAP, HTN, CAD, MI. He was asymptomatic until a month ago when he started having chest pain. He was seen by his cardiologist, cardiac cath demonstrated LAD  severe circumflex and severe RCA disease. He was sent to Dr. Lianne Portillo for evaluation in consideration of bypass surgery. Patient is s/p CABG x4 with Dr Lianne Portillo on 10/6.       Plan:  NEURO:  Acute post operative pain.  -->  - Serial neuro and pain assessments   - PRN oxycodone  - PRN dilaudid for pain   - PT Consult, OOB to chair as tolerated, chair position if not tolerated   - CAM ICU score qshift  - Sleep/wake cycle hygiene     CV:  Patient has a history of HTN,  CAD, MI. Is now status post CABG x4 Pre/Post EF: WNL.  VVI @ 50. -->  - off all hemodynamic support   - Maintain goal MAP 65-90  - Volume resuscitate as clinically indicated  - start Plavix today  - Atorvastatin 80mg PO  - discontinue nitroglycerin infusion and resume home imdur  - Resume home metop  - Cont holding home lisinopril   - Afib: Amiodarone bolus x2 and gtt. Digoxin load.      PULM:  Hx of LENNY on CPAP. Chest tubes bilateral pleural and 1 med.-->  - AM cxr with some edema, no significant shortness of breath, however mild cough  - 20mg IV lasix for diuresis  - Wean FiO2 maintaining SpO2 >92%.   - IS q1h and OOB to chair   - Chest tube output appropriate - plan to remove today     GI:  OG in place.-->  - Passed swallow study -> diabetic diet ordered and tolerating well  - Discontinue PPI since extubated   - Colace/senna BID and miralax BID     :  No history of renal disease, baseline creatinine .9. Creatinine stable post-op. Hutchinson in place and making adequate UOP. -->  - Diuresis as above.  - Continue hutchinson catheter for strict I/Os.  - Goal UOP 0.5ml/kg/hr  - RFP this PM and as clinically indicated  - Replete electrolytes per [facility] protocol     ENDO:  PMH of IDDM on insulin pump and semaglutide. A1c: 8-->  - Maintain BG <180, insulin per [facility]ICU protocol  - Off insulin gtt last night   - endocrine consult for insulin regimen given prior insulin pump, appreciate recs    - increase glargine 60u daily, lispro scale #4 q4h and 5u with meals, per endocrine      HEME:  Acute blood loss anemia and thrombocytopenia.-->    - Monitor drain output volume and characteristics  - CBC, coags, and fibrinogen post op and as clinically indicated  - cont ASA and Plavix, as above  - SQH   - SCDs for DVT prophylaxis.  - Last type and screen: 10/6     ID:  Afebrile, no current indications of infection. MRSA neg.-->  - Trend temp q4h  - Periop cefazolin x 48hrs     Skin:  No active skin issues.  - preventative Mepilex dressings in  place on sacrum and heels  - change preventative Mepilex weekly or more frequently as indicated (when moist/soiled)   - every shift skin assessment per nursing and weekly ICU skin rounds  - moisture barrier to be applied with gwen care  - active skin problems addressed with nursing on daily rounds     Proph:  SCDs  PPI     G:  Lines  Right IJ MAC w Minimac placed 10/6  Left brachial a-line placed 10/6  PIVs  Akte     F: Family: will update at bedside as able.     A,B,C,D,E,F,G: reviewed        Dispo: continue CTICU care      Jaydon Sandy, DO  Emergency Medicine PGY-2

## 2023-10-08 NOTE — PROGRESS NOTES
Tim Kenney is a 58 y.o. male on day 3 of admission presenting with Coronary artery disease of native artery of native heart with stable angina pectoris (CMS/HCC).    Subjective   Pt in Afib, will remain in icu  His sister brought his insulin pump in from home, all settings listed below  Of note, pt uses large amt of basal insulin (avg 71u/d) and very little bolus insulin (avg of 7.9u/d)- pt states he rarely eats at home due to no appetite.  I have reviewed histories, allergies and medications have been reviewed        Objective   Review of Systems   Constitutional:  Positive for fatigue. Negative for activity change, appetite change, diaphoresis and unexpected weight change.   HENT:  Negative for congestion, sore throat and trouble swallowing.    Eyes:  Negative for pain, redness and visual disturbance.   Respiratory:  Positive for cough. Negative for chest tightness and shortness of breath.    Cardiovascular:  Positive for chest pain (appropriate s/p cabg). Negative for palpitations and leg swelling.   Gastrointestinal:  Negative for abdominal pain, diarrhea, nausea and vomiting.   Endocrine: Negative for cold intolerance, heat intolerance, polydipsia, polyphagia and polyuria.   Genitourinary:  Negative for dysuria, frequency and urgency.   Musculoskeletal:  Negative for gait problem and joint swelling.   Skin:  Negative for pallor and rash.   Allergic/Immunologic: Negative for immunocompromised state.   Neurological:  Negative for dizziness, light-headedness, numbness and headaches.   Hematological:  Does not bruise/bleed easily.   Psychiatric/Behavioral:  Negative for agitation, behavioral problems and confusion.    All other systems reviewed and are negative.    Physical Exam  Vitals reviewed.   Constitutional:       General: He is not in acute distress.     Appearance: Normal appearance.   HENT:      Head: Normocephalic and atraumatic.      Nose: Nose normal.      Mouth/Throat:      Mouth: Mucous membranes  "are moist.   Eyes:      Extraocular Movements: Extraocular movements intact.      Conjunctiva/sclera: Conjunctivae normal.      Pupils: Pupils are equal, round, and reactive to light.   Cardiovascular:      Pulses: Normal pulses.      Comments: Sternotomy scar, chest tube  Pulmonary:      Effort: Pulmonary effort is normal. No respiratory distress.   Abdominal:      General: Abdomen is flat. There is no distension.   Musculoskeletal:         General: Normal range of motion.   Skin:     General: Skin is warm and dry.      Findings: No rash.   Neurological:      Mental Status: He is alert and oriented to person, place, and time.   Psychiatric:         Mood and Affect: Mood normal.         Behavior: Behavior normal.         Last Recorded Vitals  Blood pressure 139/77, pulse 89, temperature 36.8 °C (98.2 °F), resp. rate 17, height 1.88 m (6' 2\"), weight 118 kg (260 lb 2.3 oz), SpO2 100 %.  Intake/Output last 3 Shifts:  I/O last 3 completed shifts:  In: 1478.7 (12.5 mL/kg) [P.O.:250; I.V.:928.7 (7.9 mL/kg); IV Piggyback:300]  Out: 3795 (32.2 mL/kg) [Urine:3335 (0.8 mL/kg/hr); Chest Tube:460]  Weight: 118 kg     Relevant Results  Results from last 7 days   Lab Units 10/09/23  1146 10/09/23  0830 10/09/23  0445 10/09/23  0210 10/09/23  0013 10/08/23  2101 10/08/23  1835 10/08/23  1414 10/08/23  0419 10/08/23  0155 10/07/23  1248 10/07/23  1241 10/07/23  0431 10/07/23  0316   POCT GLUCOSE mg/dL 250* 279* 166*  --  220* 229*   < >  --    < >  --    < >  --    < >  --    GLUCOSE mg/dL  --   --   --  189*  --   --   --  248*  --  126*  --  128*  --  374*    < > = values in this interval not displayed.         Results from last 7 days   Lab Units 10/09/23  0210   SODIUM mmol/L 137   POTASSIUM mmol/L 3.9   CHLORIDE mmol/L 100   CO2 mmol/L 27   BUN mg/dL 21   CREATININE mg/dL 0.88   CALCIUM mg/dL 8.6   ALBUMIN g/dL 3.7   GLUCOSE mg/dL 189*     Lab Results   Component Value Date    HGBA1C 8.0 (A) 09/25/2023     Scheduled " medications  acetaminophen, 650 mg, oral, q6h  amiodarone, 400 mg, oral, TID  aspirin, 81 mg, oral, Daily  atorvastatin, 80 mg, oral, Daily  clopidogrel, 75 mg, oral, Daily  gabapentin, 600 mg, oral, 4x daily  heparin (porcine), 5,000 Units, subcutaneous, q8h  insulin glargine, 60 Units, subcutaneous, q24h  insulin lispro, 0-20 Units, subcutaneous, q4h  insulin lispro, 10 Units, subcutaneous, TID with meals  isosorbide mononitrate ER, 30 mg, oral, Daily  metoprolol tartrate, 25 mg, oral, BID  oxygen, , inhalation, Continuous - Inhalation  polyethylene glycol, 17 g, oral, TID  potassium phosphate, 15 mmol, intravenous, Once  sennosides-docusate sodium, 1 tablet, oral, BID      Continuous medications  amiodarone, 1 mg/min, Last Rate: 1 mg/min (10/09/23 1300)  lactated Ringer's, 5 mL/hr, Last Rate: 5 mL/hr (10/09/23 1300)      PRN medications  PRN medications: dextrose 10 % in water (D10W), dextrose **OR** glucagon, naloxone, oxyCODONE, oxygen  Tim Kenney is a 58-year-old male with a hx of IDDM, LENNY on CPAP, HTN, CAD, MI. He was asymptomatic until a month ago when he started having chest pain. He was seen by his cardiologist, cardiac cath demonstrated LAD  severe circumflex and severe RCA disease. He was sent to Dr. Lianne Portillo for evaluation in consideration of bypass surgery. Patient is s/p CABG x4 with Dr Lianne Portillo.      ASSESSMENT -T2DM s/p CABG  Diabetes History  Outpatient provider for endocrine care Dr. Rosenthal at [facility], date of last visit 8/23/23  Initial diabetes diagnosis was made 20 years ago. Pt has been on insulin pump (medtronic 770G) for past 2 years  Known complications due to diabetes include: peripheral neuropathy, CAD s/p CABG x4, obesity, and hyperglycemia     Home Management  Pt states he uses medtronic 770g insulin pump with guardian sensor, as well as rybelsus 10mg daily. Pt has 780G software at home but has not yet upgraded his pump.      10/7- Pt states he changes his site every 3 days,  and fills his pump with approx 300u of insulin.  CareEverywhere reviewed, no insulin pump settings listed in notes from [facility]  Pt does not know his insulin pump settings, does not have his pump with him, and states he does not remember how much insulin he took when he was on injections.     Pt with high insulin requirements on gtt s/p CABG d/t post-operative stress state and pain causing hyperglycemia. Pt was also on pressors and multiple medications mixed with dextrose, insulin gtt able to titrate down quickly once those meds were discontinued. Will transition pt to subcutaneous insulin based on insulin gtt requirments as well as weight based insulin dosing. Pt agreeable to plan      10/8- Pump Settings  Basal Rates: 3.5u/h - TDB 85u (71u is 30 day average in automode)  Insulin to Carb Ratio: 1u:3g carbs   Insulin Sensitivity Factor: 1u: 6 glc pts  Targets: 100-120  Duration of Insulin Action: 3h  Of note, pt uses large amt of basal insulin (avg 71u/d) and very little bolus insulin (avg of 7.9u/d)- pt states he rarely eats at home due to no appetite.     PLAN-  - Goal BG <180  - continue glargine 60u once daily  -continue lispro corrective scale #4 q4h  -start lispro 10u with meals (give if pt eats >50% of meal), may correlate scale to with meals during daytime      -diabetes diet 60g CHO per meal  -Accuchecks q4h on subcutaneous regimen  -Hypoglycemia protocol  -Will continue to follow and titrate insulin accordingly     Pt may expect to restart insulin pump once the following conditions are met:  1. Pt has pump and all supplies for pump brought in from home- brought in by sister  2. Pt signed insulin pump consent form- signed 10/8, placed in pt chart  3. Pt is out of the ICU     I spent 40 minutes in the professional and overall care of this patient.      Lucy Mora PA-C

## 2023-10-09 ENCOUNTER — APPOINTMENT (OUTPATIENT)
Dept: RADIOLOGY | Facility: HOSPITAL | Age: 58
DRG: 236 | End: 2023-10-09
Payer: COMMERCIAL

## 2023-10-09 LAB
ALBUMIN SERPL BCP-MCNC: 3.7 G/DL (ref 3.4–5)
ANION GAP SERPL CALC-SCNC: 14 MMOL/L (ref 10–20)
BUN SERPL-MCNC: 21 MG/DL (ref 6–23)
CA-I BLD-SCNC: 1.12 MMOL/L (ref 1.1–1.33)
CALCIUM SERPL-MCNC: 8.6 MG/DL (ref 8.6–10.6)
CFT BLD TEG: 1.1 MIN (ref 0.8–2.1)
CFT BLD TEG: 1.6 MIN (ref 0.8–2.1)
CHLORIDE SERPL-SCNC: 100 MMOL/L (ref 98–107)
CLOT ANGLE BLD TEG: 70 DEG (ref 63–78)
CLOT ANGLE BLD TEG: 75 DEG (ref 63–78)
CLOT INIT BLD TEG: 6.3 MIN (ref 4.6–9.1)
CLOT INIT BLD TEG: 7.7 MIN (ref 4.6–9.1)
CLOT INIT P HPASE BLD TEG: 5.5 MIN (ref 4.3–8.3)
CLOT INIT P HPASE BLD TEG: 8.2 MIN (ref 4.3–8.3)
CO2 SERPL-SCNC: 27 MMOL/L (ref 21–32)
CREAT SERPL-MCNC: 0.88 MG/DL (ref 0.5–1.3)
ERYTHROCYTE [DISTWIDTH] IN BLOOD BY AUTOMATED COUNT: 13.5 % (ref 11.5–14.5)
FIBRINOGEN BLD CALC-MCNC: 403 MG/DL (ref 278–581)
FIBRINOGEN BLD CALC-MCNC: 474 MG/DL (ref 278–581)
FIBRINOGEN PPP-MCNC: 531 MG/DL (ref 200–400)
GFR SERPL CREATININE-BSD FRML MDRD: >90 ML/MIN/1.73M*2
GLUCOSE BLD MANUAL STRIP-MCNC: 125 MG/DL (ref 74–99)
GLUCOSE BLD MANUAL STRIP-MCNC: 166 MG/DL (ref 74–99)
GLUCOSE BLD MANUAL STRIP-MCNC: 210 MG/DL (ref 74–99)
GLUCOSE BLD MANUAL STRIP-MCNC: 220 MG/DL (ref 74–99)
GLUCOSE BLD MANUAL STRIP-MCNC: 250 MG/DL (ref 74–99)
GLUCOSE BLD MANUAL STRIP-MCNC: 279 MG/DL (ref 74–99)
GLUCOSE SERPL-MCNC: 189 MG/DL (ref 74–99)
HCT VFR BLD AUTO: 23.3 % (ref 41–52)
HGB BLD-MCNC: 7.8 G/DL (ref 13.5–17.5)
MAGNESIUM SERPL-MCNC: 2.06 MG/DL (ref 1.6–2.4)
MCF BLD TEG: 22 MM (ref 15–32)
MCF BLD TEG: 26 MM (ref 15–32)
MCF BLD TEG: 60 MM (ref 52–69)
MCF BLD TEG: 61 MM (ref 52–70)
MCF BLD TEG: 62 MM (ref 52–69)
MCF BLD TEG: 64 MM (ref 52–70)
MCH RBC QN AUTO: 30.2 PG (ref 26–34)
MCHC RBC AUTO-ENTMCNC: 33.5 G/DL (ref 32–36)
MCV RBC AUTO: 90 FL (ref 80–100)
NRBC BLD-RTO: 0 /100 WBCS (ref 0–0)
PHOSPHATE SERPL-MCNC: 2 MG/DL (ref 2.5–4.9)
PLATELET # BLD AUTO: 86 X10*3/UL (ref 150–450)
PMV BLD AUTO: 11.8 FL (ref 7.5–11.5)
POTASSIUM SERPL-SCNC: 3.9 MMOL/L (ref 3.5–5.3)
RBC # BLD AUTO: 2.58 X10*6/UL (ref 4.5–5.9)
SODIUM SERPL-SCNC: 137 MMOL/L (ref 136–145)
TEST COMMENT: NORMAL
TEST COMMENT: NORMAL
WBC # BLD AUTO: 14.3 X10*3/UL (ref 4.4–11.3)

## 2023-10-09 PROCEDURE — 96372 THER/PROPH/DIAG INJ SC/IM: CPT

## 2023-10-09 PROCEDURE — 1200000002 HC GENERAL ROOM WITH TELEMETRY DAILY

## 2023-10-09 PROCEDURE — 83735 ASSAY OF MAGNESIUM: CPT | Performed by: STUDENT IN AN ORGANIZED HEALTH CARE EDUCATION/TRAINING PROGRAM

## 2023-10-09 PROCEDURE — 96372 THER/PROPH/DIAG INJ SC/IM: CPT | Performed by: NURSE PRACTITIONER

## 2023-10-09 PROCEDURE — 2500000004 HC RX 250 GENERAL PHARMACY W/ HCPCS (ALT 636 FOR OP/ED): Performed by: STUDENT IN AN ORGANIZED HEALTH CARE EDUCATION/TRAINING PROGRAM

## 2023-10-09 PROCEDURE — 97161 PT EVAL LOW COMPLEX 20 MIN: CPT | Mod: GP

## 2023-10-09 PROCEDURE — 2500000002 HC RX 250 W HCPCS SELF ADMINISTERED DRUGS (ALT 637 FOR MEDICARE OP, ALT 636 FOR OP/ED)

## 2023-10-09 PROCEDURE — 2500000001 HC RX 250 WO HCPCS SELF ADMINISTERED DRUGS (ALT 637 FOR MEDICARE OP)

## 2023-10-09 PROCEDURE — 99232 SBSQ HOSP IP/OBS MODERATE 35: CPT | Performed by: PHYSICIAN ASSISTANT

## 2023-10-09 PROCEDURE — 71045 X-RAY EXAM CHEST 1 VIEW: CPT

## 2023-10-09 PROCEDURE — 2500000002 HC RX 250 W HCPCS SELF ADMINISTERED DRUGS (ALT 637 FOR MEDICARE OP, ALT 636 FOR OP/ED): Performed by: PHYSICIAN ASSISTANT

## 2023-10-09 PROCEDURE — 71045 X-RAY EXAM CHEST 1 VIEW: CPT | Performed by: RADIOLOGY

## 2023-10-09 PROCEDURE — 2500000001 HC RX 250 WO HCPCS SELF ADMINISTERED DRUGS (ALT 637 FOR MEDICARE OP): Performed by: NURSE PRACTITIONER

## 2023-10-09 PROCEDURE — 2500000002 HC RX 250 W HCPCS SELF ADMINISTERED DRUGS (ALT 637 FOR MEDICARE OP, ALT 636 FOR OP/ED): Performed by: NURSE PRACTITIONER

## 2023-10-09 PROCEDURE — 96372 THER/PROPH/DIAG INJ SC/IM: CPT | Performed by: PHYSICIAN ASSISTANT

## 2023-10-09 PROCEDURE — 2500000004 HC RX 250 GENERAL PHARMACY W/ HCPCS (ALT 636 FOR OP/ED)

## 2023-10-09 PROCEDURE — 82947 ASSAY GLUCOSE BLOOD QUANT: CPT

## 2023-10-09 PROCEDURE — 80069 RENAL FUNCTION PANEL: CPT | Performed by: STUDENT IN AN ORGANIZED HEALTH CARE EDUCATION/TRAINING PROGRAM

## 2023-10-09 PROCEDURE — 2500000004 HC RX 250 GENERAL PHARMACY W/ HCPCS (ALT 636 FOR OP/ED): Performed by: NURSE PRACTITIONER

## 2023-10-09 PROCEDURE — 2500000001 HC RX 250 WO HCPCS SELF ADMINISTERED DRUGS (ALT 637 FOR MEDICARE OP): Performed by: STUDENT IN AN ORGANIZED HEALTH CARE EDUCATION/TRAINING PROGRAM

## 2023-10-09 PROCEDURE — 82330 ASSAY OF CALCIUM: CPT | Performed by: STUDENT IN AN ORGANIZED HEALTH CARE EDUCATION/TRAINING PROGRAM

## 2023-10-09 PROCEDURE — 85384 FIBRINOGEN ACTIVITY: CPT | Performed by: STUDENT IN AN ORGANIZED HEALTH CARE EDUCATION/TRAINING PROGRAM

## 2023-10-09 PROCEDURE — 2060000001 HC INTERMEDIATE ICU ROOM DAILY

## 2023-10-09 PROCEDURE — 37799 UNLISTED PX VASCULAR SURGERY: CPT | Performed by: STUDENT IN AN ORGANIZED HEALTH CARE EDUCATION/TRAINING PROGRAM

## 2023-10-09 PROCEDURE — 2500000005 HC RX 250 GENERAL PHARMACY W/O HCPCS

## 2023-10-09 PROCEDURE — 99232 SBSQ HOSP IP/OBS MODERATE 35: CPT

## 2023-10-09 PROCEDURE — 85027 COMPLETE CBC AUTOMATED: CPT | Performed by: STUDENT IN AN ORGANIZED HEALTH CARE EDUCATION/TRAINING PROGRAM

## 2023-10-09 RX ORDER — POLYETHYLENE GLYCOL 3350 17 G/17G
17 POWDER, FOR SOLUTION ORAL 3 TIMES DAILY
Status: DISCONTINUED | OUTPATIENT
Start: 2023-10-09 | End: 2023-10-12 | Stop reason: HOSPADM

## 2023-10-09 RX ORDER — GABAPENTIN 600 MG/1
600 TABLET ORAL 4 TIMES DAILY
Status: DISCONTINUED | OUTPATIENT
Start: 2023-10-09 | End: 2023-10-10

## 2023-10-09 RX ORDER — ASPIRIN 81 MG/1
81 TABLET ORAL DAILY
Status: DISCONTINUED | OUTPATIENT
Start: 2023-10-10 | End: 2023-10-12 | Stop reason: HOSPADM

## 2023-10-09 RX ORDER — AMOXICILLIN 250 MG
1 CAPSULE ORAL 2 TIMES DAILY
Status: DISCONTINUED | OUTPATIENT
Start: 2023-10-09 | End: 2023-10-09

## 2023-10-09 RX ORDER — AMIODARONE HYDROCHLORIDE 200 MG/1
400 TABLET ORAL 3 TIMES DAILY
Status: DISCONTINUED | OUTPATIENT
Start: 2023-10-09 | End: 2023-10-11

## 2023-10-09 RX ORDER — ONDANSETRON HYDROCHLORIDE 2 MG/ML
INJECTION, SOLUTION INTRAVENOUS
Status: COMPLETED
Start: 2023-10-09 | End: 2023-10-09

## 2023-10-09 RX ORDER — BISACODYL 10 MG/1
10 SUPPOSITORY RECTAL DAILY PRN
Status: DISCONTINUED | OUTPATIENT
Start: 2023-10-09 | End: 2023-10-11

## 2023-10-09 RX ORDER — INSULIN LISPRO 100 [IU]/ML
10 INJECTION, SOLUTION INTRAVENOUS; SUBCUTANEOUS
Status: DISCONTINUED | OUTPATIENT
Start: 2023-10-09 | End: 2023-10-10

## 2023-10-09 RX ORDER — ACETAMINOPHEN 325 MG/1
975 TABLET ORAL EVERY 8 HOURS SCHEDULED
Status: DISCONTINUED | OUTPATIENT
Start: 2023-10-09 | End: 2023-10-12 | Stop reason: HOSPADM

## 2023-10-09 RX ORDER — DOCUSATE SODIUM 100 MG/1
100 CAPSULE, LIQUID FILLED ORAL 2 TIMES DAILY
Status: DISCONTINUED | OUTPATIENT
Start: 2023-10-09 | End: 2023-10-12 | Stop reason: HOSPADM

## 2023-10-09 RX ORDER — ONDANSETRON HYDROCHLORIDE 2 MG/ML
4 INJECTION, SOLUTION INTRAVENOUS EVERY 8 HOURS PRN
Status: DISCONTINUED | OUTPATIENT
Start: 2023-10-09 | End: 2023-10-12 | Stop reason: HOSPADM

## 2023-10-09 RX ORDER — ONDANSETRON HYDROCHLORIDE 2 MG/ML
4 INJECTION, SOLUTION INTRAVENOUS ONCE
Status: COMPLETED | OUTPATIENT
Start: 2023-10-09 | End: 2023-10-09

## 2023-10-09 RX ORDER — DEXTROSE MONOHYDRATE 100 MG/ML
0.3 INJECTION, SOLUTION INTRAVENOUS ONCE AS NEEDED
Status: DISCONTINUED | OUTPATIENT
Start: 2023-10-09 | End: 2023-10-09

## 2023-10-09 RX ADMIN — ACETAMINOPHEN 650 MG: 325 TABLET ORAL at 09:30

## 2023-10-09 RX ADMIN — HEPARIN SODIUM 5000 UNITS: 5000 INJECTION INTRAVENOUS; SUBCUTANEOUS at 16:34

## 2023-10-09 RX ADMIN — AMIODARONE HYDROCHLORIDE 0.5 MG/MIN: 1.8 INJECTION, SOLUTION INTRAVENOUS at 08:45

## 2023-10-09 RX ADMIN — INSULIN LISPRO 5 UNITS: 100 INJECTION, SOLUTION INTRAVENOUS; SUBCUTANEOUS at 08:45

## 2023-10-09 RX ADMIN — AMIODARONE HYDROCHLORIDE 0.5 MG/MIN: 1.8 INJECTION, SOLUTION INTRAVENOUS at 23:15

## 2023-10-09 RX ADMIN — SENNOSIDES AND DOCUSATE SODIUM 1 TABLET: 8.6; 5 TABLET ORAL at 09:00

## 2023-10-09 RX ADMIN — INSULIN LISPRO 8 UNITS: 100 INJECTION, SOLUTION INTRAVENOUS; SUBCUTANEOUS at 15:54

## 2023-10-09 RX ADMIN — OXYCODONE HYDROCHLORIDE 5 MG: 5 TABLET ORAL at 06:24

## 2023-10-09 RX ADMIN — AMIODARONE HYDROCHLORIDE 1 MG/MIN: 1.8 INJECTION, SOLUTION INTRAVENOUS at 01:44

## 2023-10-09 RX ADMIN — Medication: at 08:00

## 2023-10-09 RX ADMIN — METOPROLOL TARTRATE 25 MG: 25 TABLET, FILM COATED ORAL at 20:41

## 2023-10-09 RX ADMIN — POLYETHYLENE GLYCOL 3350 17 G: 17 POWDER, FOR SOLUTION ORAL at 20:40

## 2023-10-09 RX ADMIN — ATORVASTATIN CALCIUM 80 MG: 80 TABLET, FILM COATED ORAL at 09:28

## 2023-10-09 RX ADMIN — HEPARIN SODIUM 5000 UNITS: 5000 INJECTION INTRAVENOUS; SUBCUTANEOUS at 09:30

## 2023-10-09 RX ADMIN — OXYCODONE HYDROCHLORIDE 5 MG: 5 TABLET ORAL at 19:47

## 2023-10-09 RX ADMIN — CLOPIDOGREL BISULFATE 75 MG: 75 TABLET ORAL at 09:29

## 2023-10-09 RX ADMIN — ONDANSETRON HYDROCHLORIDE 4 MG: 2 INJECTION, SOLUTION INTRAVENOUS at 13:15

## 2023-10-09 RX ADMIN — GABAPENTIN 600 MG: 600 TABLET, FILM COATED ORAL at 10:28

## 2023-10-09 RX ADMIN — INSULIN LISPRO 8 UNITS: 100 INJECTION, SOLUTION INTRAVENOUS; SUBCUTANEOUS at 11:48

## 2023-10-09 RX ADMIN — AMIODARONE HYDROCHLORIDE 400 MG: 200 TABLET ORAL at 20:40

## 2023-10-09 RX ADMIN — POTASSIUM CHLORIDE 20 MEQ: 14.9 INJECTION, SOLUTION INTRAVENOUS at 04:38

## 2023-10-09 RX ADMIN — ONDANSETRON 4 MG: 2 INJECTION INTRAMUSCULAR; INTRAVENOUS at 23:07

## 2023-10-09 RX ADMIN — INSULIN LISPRO 8 UNITS: 100 INJECTION, SOLUTION INTRAVENOUS; SUBCUTANEOUS at 00:15

## 2023-10-09 RX ADMIN — INSULIN LISPRO 4 UNITS: 100 INJECTION, SOLUTION INTRAVENOUS; SUBCUTANEOUS at 04:49

## 2023-10-09 RX ADMIN — POLYETHYLENE GLYCOL 3350 17 G: 17 POWDER, FOR SOLUTION ORAL at 15:03

## 2023-10-09 RX ADMIN — INSULIN LISPRO 10 UNITS: 100 INJECTION, SOLUTION INTRAVENOUS; SUBCUTANEOUS at 11:47

## 2023-10-09 RX ADMIN — ACETAMINOPHEN 650 MG: 325 TABLET ORAL at 15:04

## 2023-10-09 RX ADMIN — ISOSORBIDE MONONITRATE 30 MG: 30 TABLET, EXTENDED RELEASE ORAL at 09:29

## 2023-10-09 RX ADMIN — INSULIN LISPRO 10 UNITS: 100 INJECTION, SOLUTION INTRAVENOUS; SUBCUTANEOUS at 16:31

## 2023-10-09 RX ADMIN — OXYCODONE HYDROCHLORIDE 5 MG: 5 TABLET ORAL at 15:03

## 2023-10-09 RX ADMIN — ASPIRIN 81 MG CHEWABLE TABLET 81 MG: 81 TABLET CHEWABLE at 09:28

## 2023-10-09 RX ADMIN — GABAPENTIN 600 MG: 600 TABLET, FILM COATED ORAL at 20:41

## 2023-10-09 RX ADMIN — ACETAMINOPHEN 975 MG: 325 TABLET ORAL at 21:40

## 2023-10-09 RX ADMIN — INSULIN GLARGINE 60 UNITS: 100 INJECTION, SOLUTION SUBCUTANEOUS at 11:40

## 2023-10-09 RX ADMIN — POTASSIUM PHOSPHATE, MONOBASIC POTASSIUM PHOSPHATE, DIBASIC 15 MMOL: 224; 236 INJECTION, SOLUTION, CONCENTRATE INTRAVENOUS at 10:27

## 2023-10-09 RX ADMIN — METOPROLOL TARTRATE 25 MG: 25 TABLET, FILM COATED ORAL at 09:29

## 2023-10-09 RX ADMIN — AMIODARONE HYDROCHLORIDE 400 MG: 200 TABLET ORAL at 15:03

## 2023-10-09 RX ADMIN — POLYETHYLENE GLYCOL 3350 17 G: 17 POWDER, FOR SOLUTION ORAL at 09:00

## 2023-10-09 RX ADMIN — AMIODARONE HYDROCHLORIDE 400 MG: 200 TABLET ORAL at 09:00

## 2023-10-09 RX ADMIN — ONDANSETRON 4 MG: 2 INJECTION, SOLUTION INTRAMUSCULAR; INTRAVENOUS at 13:15

## 2023-10-09 RX ADMIN — AMIODARONE HYDROCHLORIDE 1 MG/MIN: 1.8 INJECTION, SOLUTION INTRAVENOUS at 20:51

## 2023-10-09 RX ADMIN — INSULIN LISPRO 12 UNITS: 100 INJECTION, SOLUTION INTRAVENOUS; SUBCUTANEOUS at 09:08

## 2023-10-09 RX ADMIN — OXYCODONE HYDROCHLORIDE 5 MG: 5 TABLET ORAL at 08:44

## 2023-10-09 RX ADMIN — HEPARIN SODIUM 5000 UNITS: 5000 INJECTION INTRAVENOUS; SUBCUTANEOUS at 01:44

## 2023-10-09 ASSESSMENT — COGNITIVE AND FUNCTIONAL STATUS - GENERAL
TURNING FROM BACK TO SIDE WHILE IN FLAT BAD: A LITTLE
TOILETING: A LITTLE
PERSONAL GROOMING: A LITTLE
MOVING FROM LYING ON BACK TO SITTING ON SIDE OF FLAT BED WITH BEDRAILS: A LITTLE
DAILY ACTIVITIY SCORE: 19
HELP NEEDED FOR BATHING: A LITTLE
WALKING IN HOSPITAL ROOM: A LITTLE
DRESSING REGULAR LOWER BODY CLOTHING: A LITTLE
WALKING IN HOSPITAL ROOM: A LITTLE
CLIMB 3 TO 5 STEPS WITH RAILING: A LITTLE
MOBILITY SCORE: 17
MOVING FROM LYING ON BACK TO SITTING ON SIDE OF FLAT BED WITH BEDRAILS: A LITTLE
STANDING UP FROM CHAIR USING ARMS: A LITTLE
CLIMB 3 TO 5 STEPS WITH RAILING: A LOT
MOBILITY SCORE: 18
DRESSING REGULAR UPPER BODY CLOTHING: A LITTLE
MOVING TO AND FROM BED TO CHAIR: A LITTLE
STANDING UP FROM CHAIR USING ARMS: A LITTLE
TURNING FROM BACK TO SIDE WHILE IN FLAT BAD: A LITTLE
MOVING TO AND FROM BED TO CHAIR: A LITTLE

## 2023-10-09 ASSESSMENT — PAIN SCALES - GENERAL
PAINLEVEL_OUTOF10: 0 - NO PAIN
PAINLEVEL_OUTOF10: 5 - MODERATE PAIN
PAINLEVEL_OUTOF10: 8
PAINLEVEL_OUTOF10: 6
PAINLEVEL_OUTOF10: 0 - NO PAIN
PAINLEVEL_OUTOF10: 2
PAINLEVEL_OUTOF10: 0 - NO PAIN
PAINLEVEL_OUTOF10: 2
PAINLEVEL_OUTOF10: 4

## 2023-10-09 ASSESSMENT — ENCOUNTER SYMPTOMS
POLYDIPSIA: 0
EYE REDNESS: 0
UNEXPECTED WEIGHT CHANGE: 0
DIAPHORESIS: 0
POLYPHAGIA: 0
NAUSEA: 0
SHORTNESS OF BREATH: 0
AGITATION: 0
APPETITE CHANGE: 0
COUGH: 1
HEADACHES: 0
FREQUENCY: 0
FATIGUE: 1
BRUISES/BLEEDS EASILY: 0
NUMBNESS: 0
CHEST TIGHTNESS: 0
SORE THROAT: 0
TROUBLE SWALLOWING: 0
PALPITATIONS: 0
DYSURIA: 0
ACTIVITY CHANGE: 0
DIZZINESS: 0
LIGHT-HEADEDNESS: 0
EYE PAIN: 0
VOMITING: 0
ABDOMINAL PAIN: 0
DIARRHEA: 0
JOINT SWELLING: 0
CONFUSION: 0

## 2023-10-09 ASSESSMENT — PAIN - FUNCTIONAL ASSESSMENT
PAIN_FUNCTIONAL_ASSESSMENT: 0-10

## 2023-10-09 ASSESSMENT — ACTIVITIES OF DAILY LIVING (ADL): ADL_ASSISTANCE: INDEPENDENT

## 2023-10-09 ASSESSMENT — PAIN DESCRIPTION - DESCRIPTORS: DESCRIPTORS: SORE

## 2023-10-09 NOTE — PROGRESS NOTES
"Tim Kenney is a 58 y.o. male on day 3 of admission presenting with Coronary artery disease of native artery of native heart with stable angina pectoris (CMS/HCC).    Subjective   In sinus rhythm all night. Patient received amiodarone bolus and gtt, digoxin, and restarted on home metoprolol. In some pain this morning, however well controlled with tylenol and PRN oxycodone. K and phos low on morning labs.        Objective     Physical Exam  Vitals reviewed.   Constitutional:       General: He is not in acute distress.     Appearance: He is not toxic-appearing.   HENT:      Head: Normocephalic and atraumatic.      Mouth/Throat:      Mouth: Mucous membranes are moist.   Eyes:      General: No scleral icterus.     Extraocular Movements: Extraocular movements intact.      Conjunctiva/sclera: Conjunctivae normal.   Cardiovascular:      Rate and Rhythm: Normal rate and regular rhythm.      Pulses: Normal pulses.      Heart sounds: Normal heart sounds. No murmur heard.  Pulmonary:      Effort: Pulmonary effort is normal.      Breath sounds: No wheezing or rhonchi.   Abdominal:      General: Abdomen is flat. There is no distension.      Palpations: Abdomen is soft. There is no mass.      Tenderness: There is no abdominal tenderness. There is no guarding or rebound.   Skin:     General: Skin is warm and dry.      Findings: No lesion or rash.   Neurological:      General: No focal deficit present.      Mental Status: He is alert and oriented to person, place, and time.      Sensory: Sensation is intact.      Motor: Motor function is intact.         Last Recorded Vitals  Blood pressure 144/74, pulse 92, temperature 36.6 °C (97.9 °F), temperature source Temporal, resp. rate 18, height 1.88 m (6' 2\"), weight 118 kg (260 lb 2.3 oz), SpO2 100 %.  Intake/Output last 3 Shifts:  I/O last 3 completed shifts:  In: 1478.7 (12.5 mL/kg) [P.O.:250; I.V.:928.7 (7.9 mL/kg); IV Piggyback:300]  Out: 3795 (32.2 mL/kg) [Urine:3335 (0.8 " mL/kg/hr); Chest Tube:460]  Weight: 118 kg     Relevant Results     Scheduled medications  acetaminophen, 650 mg, oral, q6h  aspirin, 81 mg, oral, Daily  atorvastatin, 80 mg, oral, Daily  clopidogrel, 75 mg, oral, Daily  digoxin, 125 mcg, intravenous, Daily  docusate sodium, 100 mg, oral, BID  heparin (porcine), 5,000 Units, subcutaneous, q8h  insulin glargine, 60 Units, subcutaneous, q24h  insulin lispro, 0-20 Units, subcutaneous, q4h  insulin lispro, 5 Units, subcutaneous, TID with meals  isosorbide mononitrate ER, 30 mg, oral, Daily  metoprolol tartrate, 25 mg, oral, BID  oxygen, , inhalation, Continuous - Inhalation  polyethylene glycol, 17 g, oral, Daily      Continuous medications  amiodarone, 0.5-1 mg/min, Last Rate: 1 mg/min (10/09/23 0700)  lactated Ringer's, 5 mL/hr, Last Rate: 5 mL/hr (10/09/23 0700)      PRN medications  PRN medications: calcium gluconate, calcium gluconate, dextrose 10 % in water (D10W), dextrose 10 % in water (D10W), dextrose **OR** glucagon, dextrose, dextrose, glucagon, glucagon, HYDROmorphone, magnesium sulfate, magnesium sulfate, naloxone, oxyCODONE, oxygen, potassium chloride, potassium chloride    Results for orders placed or performed during the hospital encounter of 10/06/23 (from the past 24 hour(s))   POCT GLUCOSE   Result Value Ref Range    POCT Glucose 217 (H) 74 - 99 mg/dL   POCT GLUCOSE   Result Value Ref Range    POCT Glucose 263 (H) 74 - 99 mg/dL   CBC   Result Value Ref Range    WBC 17.3 (H) 4.4 - 11.3 x10*3/uL    nRBC 0.0 0.0 - 0.0 /100 WBCs    RBC 2.76 (L) 4.50 - 5.90 x10*6/uL    Hemoglobin 8.4 (L) 13.5 - 17.5 g/dL    Hematocrit 25.1 (L) 41.0 - 52.0 %    MCV 91 80 - 100 fL    MCH 30.4 26.0 - 34.0 pg    MCHC 33.5 32.0 - 36.0 g/dL    RDW 13.8 11.5 - 14.5 %    Platelets 90 (L) 150 - 450 x10*3/uL    MPV 11.5 7.5 - 11.5 fL   Renal Function Panel   Result Value Ref Range    Glucose 248 (H) 74 - 99 mg/dL    Sodium 137 136 - 145 mmol/L    Potassium 4.1 3.5 - 5.3 mmol/L     Chloride 101 98 - 107 mmol/L    Bicarbonate 25 21 - 32 mmol/L    Anion Gap 15 10 - 20 mmol/L    Urea Nitrogen 21 6 - 23 mg/dL    Creatinine 0.92 0.50 - 1.30 mg/dL    eGFR >90 >60 mL/min/1.73m*2    Calcium 8.4 (L) 8.6 - 10.6 mg/dL    Phosphorus 2.1 (L) 2.5 - 4.9 mg/dL    Albumin 4.0 3.4 - 5.0 g/dL   Magnesium   Result Value Ref Range    Magnesium 2.14 1.60 - 2.40 mg/dL   Coagulation Screen   Result Value Ref Range    Protime 12.7 9.8 - 12.8 seconds    INR 1.1 0.9 - 1.1    aPTT 26 (L) 27 - 38 seconds   Calcium, Ionized   Result Value Ref Range    POCT Calcium, Ionized 1.07 (L) 1.1 - 1.33 mmol/L   POCT GLUCOSE   Result Value Ref Range    POCT Glucose 231 (H) 74 - 99 mg/dL   POCT GLUCOSE   Result Value Ref Range    POCT Glucose 229 (H) 74 - 99 mg/dL   POCT GLUCOSE   Result Value Ref Range    POCT Glucose 220 (H) 74 - 99 mg/dL   CBC   Result Value Ref Range    WBC 14.3 (H) 4.4 - 11.3 x10*3/uL    nRBC 0.0 0.0 - 0.0 /100 WBCs    RBC 2.58 (L) 4.50 - 5.90 x10*6/uL    Hemoglobin 7.8 (L) 13.5 - 17.5 g/dL    Hematocrit 23.3 (L) 41.0 - 52.0 %    MCV 90 80 - 100 fL    MCH 30.2 26.0 - 34.0 pg    MCHC 33.5 32.0 - 36.0 g/dL    RDW 13.5 11.5 - 14.5 %    Platelets 86 (L) 150 - 450 x10*3/uL    MPV 11.8 (H) 7.5 - 11.5 fL   Calcium, Ionized   Result Value Ref Range    POCT Calcium, Ionized 1.12 1.1 - 1.33 mmol/L   Magnesium   Result Value Ref Range    Magnesium 2.06 1.60 - 2.40 mg/dL   Renal Function Panel   Result Value Ref Range    Glucose 189 (H) 74 - 99 mg/dL    Sodium 137 136 - 145 mmol/L    Potassium 3.9 3.5 - 5.3 mmol/L    Chloride 100 98 - 107 mmol/L    Bicarbonate 27 21 - 32 mmol/L    Anion Gap 14 10 - 20 mmol/L    Urea Nitrogen 21 6 - 23 mg/dL    Creatinine 0.88 0.50 - 1.30 mg/dL    eGFR >90 >60 mL/min/1.73m*2    Calcium 8.6 8.6 - 10.6 mg/dL    Phosphorus 2.0 (L) 2.5 - 4.9 mg/dL    Albumin 3.7 3.4 - 5.0 g/dL   Fibrinogen   Result Value Ref Range    Fibrinogen 531 (H) 200 - 400 mg/dL   POCT GLUCOSE   Result Value Ref Range     POCT Glucose 166 (H) 74 - 99 mg/dL         Assessment/Plan   Principal Problem:    Coronary artery disease of native artery of native heart with stable angina pectoris (CMS/HCC)    Assessment: Tim Kenney is a 58-year-old male with a hx of IDDM, LENNY on CPAP, HTN, CAD, MI. He was asymptomatic until a month ago when he started having chest pain. He was seen by his cardiologist, cardiac cath demonstrated LAD  severe circumflex and severe RCA disease. He was sent to Dr. Lianne Portillo for evaluation in consideration of bypass surgery. Patient is s/p CABG x4 with Dr Lianne Portillo on 10/6.     Patient progressed well after surgery. He did not require ongoing hemodynamic support. He did go into atrial fibrillation on 10/8 requiring multiple medications for rate and chemical cardioversion, now back in sinus rhythm. Pain has been an issue off and on, however relatively well controlled with PO acetaminophen and oxycodone. Patient's diabetes was a particular concern immediately post op, however was quickly controlled with insulin gtt. Endocrinology was consulted and helped manage his insulin regimen.       Plan:  NEURO:  Acute post operative pain.  -->  - Serial neuro and pain assessments   - Scheduled acetaminophen  - PRN oxycodone  - Restart home gabapentin 600mg PO 4 times/day   - PT Consult, OOB to chair as tolerated, chair position if not tolerated   - CAM ICU score qshift  - Sleep/wake cycle hygiene     CV:  Patient has a history of HTN, CAD, MI. Is now status post CABG x4 Pre/Post EF: WNL.  VVI @ 50. Atrial fibrillation 10/8. -->  - off all hemodynamic support   - Maintain goal MAP 65-90  - Volume resuscitate as clinically indicated  - Continue Plavix 75mg PO, Atorvastatin 80mg PO, Imdur 30mg PO   - Cont holding home lisinopril   - Transition amiodarone gtt to 400mg PO TID, overlap gtt until at least 2 PO doses  - Discontinue digoxin   - Cont metoprolol 25 BID      PULM:  Hx of LENNY on CPAP. Chest tubes bilateral pleural and 1  med.-->  - Chest tubes removed, well tolerated   - On room air, maintain SpO2 >92%.   - IS q1h and OOB to chair      GI:  OG in place.-->  - Diabetic diet   - Colace/senna BID and miralax BID     :  No history of renal disease, baseline creatinine .9. Creatinine stable post-op. Kate in place and making adequate UOP. -->  - Goal UOP 0.5ml/kg/hr  - Discontinue Kate catheter  - RFP as clinically indicated  - Replete electrolytes per CTICU protocol     ENDO:  PMH of IDDM on insulin pump and semaglutide. A1c: 8-->  - Maintain BG <180, insulin per CTICU protocol  - endocrine consult for insulin regimen given prior insulin pump, appreciate recs    - glargine 60u daily, lispro scale #4 q4h and 5u with meals, per endocrine      HEME:  Acute blood loss anemia and thrombocytopenia.-->    - CBC, coags, and fibrinogen post op and as clinically indicated  - cont ASA and Plavix, as above  - SQH   - SCDs for DVT prophylaxis.  - Last type and screen: 10/6     ID:  Afebrile, no current indications of infection. MRSA neg.-->  - Trend temp q4h  - Periop cefazolin x 48hrs     Skin:  No active skin issues.  - preventative Mepilex dressings in place on sacrum and heels  - change preventative Mepilex weekly or more frequently as indicated (when moist/soiled)   - every shift skin assessment per nursing and weekly ICU skin rounds  - moisture barrier to be applied with gwen care  - active skin problems addressed with nursing on daily rounds     Proph:  SCDs  PPI     G:  Lines  Right IJ MAC w [place] placed 10/6 - remove 10/9  Left brachial a-line placed 10/6 - remove 10/9  PIVs  Kate - remove 10/9     F: Family: will update at bedside as able.     A,B,C,D,E,F,G: reviewed        Dispo: transfer to  3      Jaydon Sandy, DO  Emergency Medicine PGY-2

## 2023-10-09 NOTE — PROGRESS NOTES
Physical Therapy    Physical Therapy Evaluation    Patient Name: Tim Kenney  MRN: [id number]  Today's Date: 10/9/2023   Time Calculation  Start Time: 1234  Stop Time: 1301  Time Calculation (min): 27 min    Assessment/Plan   PT Assessment  PT Assessment Results: Decreased strength, Decreased endurance, Impaired balance, Decreased mobility, Pain  Rehab Prognosis: Excellent  Evaluation/Treatment Tolerance: Patient tolerated treatment well  Medical Staff Made Aware: Yes  End of Session Communication: Bedside nurse  End of Session Patient Position: Bed, 3 rail up, Alarm off, not on at start of session  IP OR SWING BED PT PLAN  Inpatient or Swing Bed: Inpatient  PT Plan  Treatment/Interventions: Bed mobility, Transfer training, Gait training, Stair training, Balance training, Strengthening, Endurance training, Therapeutic exercise, Therapeutic activity  PT Plan: Skilled PT  PT Frequency: 3 times per week  PT Discharge Recommendations: Low intensity level of continued care  PT Recommended Transfer Status: Assist x1      Subjective   General Visit Information:  General  Reason for Referral: 58 yr old male s/p CABG x4  Referred By: Dr Lianne Portillo.  Past Medical History Relevant to Rehab: IDDM, LENNY on CPAP, hypertension.  Family/Caregiver Present: Yes  Caregiver Feedback: Sister present throughout session.  Reviewed MITT, hand provided  Prior to Session Communication: Bedside nurse  Patient Position Received: Bed, 3 rail up, Alarm off, not on at start of session  Preferred Learning Style: auditory, verbal, visual, written  General Comment: Supine, awake, and alert.  Pt willing to participate in PT session.  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 )  Prior Function Comments: Denies any falls in past 6 months  Precautions:  Precautions  Hearing/Visual Limitations: WFL  Medical Precautions: Cardiac precautions, Fall precautions  Post-Surgical Precautions: Move in the Tube  Precautions Comment: MAP 65-90, SpO2 >92%  Vital Signs:  Vital Signs  Heart Rate: 98 (End of session - 90)  Heart Rate Source: Monitor  Resp: 19 (End of session 17)  SpO2: 97 % (End of session - 96)  BP: 148/81 (End of session - 139/77)  MAP (mmHg): 100 (End of session - 94)  BP Method: Automatic  Patient Position: Lying    Objective   Pain:  Pain Assessment  Pain Assessment: 0-10  Pain Score: 6  Pain Type: Surgical pain  Pain Location: Chest  Pain Frequency: Constant/continuous  Pain Onset: Ongoing  Cognition:  Cognition  Overall Cognitive Status: Within Functional Limits    General Assessments:  General Observation  General Observation: Call light in reach.  Returned to supine to allow RN to removed CVC line.     Sensation  Light Touch: No apparent deficits  Sharp/Dull: No apparent deficits    Static Sitting Balance  Static Sitting-Balance Support: Bilateral upper extremity supported  Static Sitting-Level of Assistance: Close supervision    Static Standing Balance  Static Standing-Balance Support: Bilateral upper extremity supported  Static Standing-Level of Assistance: Contact guard  Static Standing-Comment/Number of Minutes: FWW  Functional Assessments:  Bed Mobility  Bed Mobility: Yes  Bed Mobility 1  Bed Mobility 1: Supine to sitting, Sitting to supine  Level of Assistance 1: Contact guard  Bed Mobility Comments 1: HOB elevated    Transfers  Transfer: Yes  Transfer 1  Transfer From 1: Sit to, Stand to  Transfer to 1: Sit, Stand  Technique 1: Sit to stand,  Stand to sit  Transfer Device 1: Walker  Transfer Level of Assistance 1: Contact guard  Trials/Comments 1: Cues for hand placement and proper use of AD    Ambulation/Gait Training  Ambulation/Gait Training Performed: Yes  Ambulation/Gait Training 1  Surface 1: Level tile  Device 1: Rolling walker  Assistance 1: Contact guard  Comments/Distance (ft) 1: ~200ft x1 (Decreased carole, short steps, cues to relax shoulders with ambulation)    Stairs  Stairs: No  Extremity/Trunk Assessments:  RUE   RUE : Within Functional Limits  LUE   LUE: Within Functional Limits  RLE   RLE : Within Functional Limits  LLE   LLE : Within Functional Limits  Outcome Measures:  [facility] Basic Mobility  Turning from your back to your side while in a flat bed without using bedrails: A little  Moving from lying on your back to sitting on the side of a flat bed without using bedrails: A little  Moving to and from bed to chair (including a wheelchair): A little  Standing up from a chair using your arms (e.g. wheelchair or bedside chair): A little  To walk in hospital room: A little  Climbing 3-5 steps with railing: A lot  Basic Mobility - Total Score: 17    FSS-ICU  Ambulation: Walks >/ or equal to 150 feet with supervision  Rolling: Supervision or set-up only  Sitting: Supervision or set-up only  Transfer Sit-to-Stand: Supervision or set-up only  Transfer Supine-to-Sit: Supervision or set-up only  Total Score: 25    Encounter Problems       Encounter Problems (Active)       Balance       Pt will demonstrated ability to score at least 24/28 on the Tinetti balance assessment tool to ensure safety upon D/C.  (Progressing)       Start:  10/09/23    Expected End:  10/23/23               Mobility       Pt will demonstrated ability to ambulate >/=400ft with proper form and no balance deficits for safe home going.   (Progressing)       Start:  10/09/23    Expected End:  10/23/23            Pt will demonstrate ability to complete 5X STS in < 12 sec with  good from and consistency between transfers for safe D/C.  (Progressing)       Start:  10/09/23    Expected End:  10/23/23               Transfers       Pt will demonstrated ability to complete bed mobility and sit<>stand transfers without assistance and assistive device to safely return home.  (Progressing)       Start:  10/09/23    Expected End:  10/23/23                  Encounter Problems (Resolved)       Pain - Adult              Education Documentation  Handouts, taught by Pallavi Joseph PT at 10/9/2023  2:36 PM.  Learner: Family, Patient  Readiness: Acceptance  Method: Explanation, Demonstration, Handout  Response: Verbalizes Understanding, Demonstrated Understanding  Comment: MITT (handout provided and reviewed with the pt), log roll, splinting, use of AD    Precautions, taught by Pallavi Joseph PT at 10/9/2023  2:36 PM.  Learner: Family, Patient  Readiness: Acceptance  Method: Explanation, Demonstration, Handout  Response: Verbalizes Understanding, Demonstrated Understanding  Comment: MITT (handout provided and reviewed with the pt), log roll, splinting, use of AD    Body Mechanics, taught by Pallavi Joseph PT at 10/9/2023  2:36 PM.  Learner: Family, Patient  Readiness: Acceptance  Method: Explanation, Demonstration, Handout  Response: Verbalizes Understanding, Demonstrated Understanding  Comment: MITT (handout provided and reviewed with the pt), log roll, splinting, use of AD    Mobility Training, taught by Pallavi Joseph PT at 10/9/2023  2:36 PM.  Learner: Family, Patient  Readiness: Acceptance  Method: Explanation, Demonstration, Handout  Response: Verbalizes Understanding, Demonstrated Understanding  Comment: MITT (handout provided and reviewed with the pt), log roll, splinting, use of AD    Education Comments  No comments found.

## 2023-10-09 NOTE — CARE PLAN
Problem: Balance  Goal: Pt will demonstrated ability to score at least 24/28 on the Tinetti balance assessment tool to ensure safety upon D/C.   Outcome: Progressing     Problem: Mobility  Goal: Pt will demonstrated ability to ambulate >/=400ft with proper form and no balance deficits for safe home going.    Outcome: Progressing  Goal: Pt will demonstrate ability to complete 5X STS in < 12 sec with good from and consistency between transfers for safe D/C.   Outcome: Progressing     Problem: Transfers  Goal: Pt will demonstrated ability to complete bed mobility and sit<>stand transfers without assistance and assistive device to safely return home.   Outcome: Progressing

## 2023-10-09 NOTE — CARE PLAN
The patient's goals for the shift include      The clinical goals for the shift include      Over the shift, the patient did not make progress toward the following goals. Barriers to progression include time needed. Recommendations to address these barriers include time.

## 2023-10-10 ENCOUNTER — APPOINTMENT (OUTPATIENT)
Dept: RADIOLOGY | Facility: HOSPITAL | Age: 58
DRG: 236 | End: 2023-10-10
Payer: COMMERCIAL

## 2023-10-10 PROBLEM — I25.118 CORONARY ARTERY DISEASE OF NATIVE ARTERY OF NATIVE HEART WITH STABLE ANGINA PECTORIS (CMS-HCC): Status: RESOLVED | Noted: 2023-10-02 | Resolved: 2023-10-10

## 2023-10-10 PROBLEM — E78.2 MIXED HYPERLIPIDEMIA: Status: RESOLVED | Noted: 2023-02-12 | Resolved: 2023-10-10

## 2023-10-10 PROBLEM — I25.10 CORONARY ARTERY DISEASE INVOLVING NATIVE CORONARY ARTERY OF NATIVE HEART WITHOUT ANGINA PECTORIS: Status: RESOLVED | Noted: 2023-02-12 | Resolved: 2023-10-10

## 2023-10-10 PROBLEM — S92.909A FOOT FRACTURE: Status: RESOLVED | Noted: 2023-02-12 | Resolved: 2023-10-10

## 2023-10-10 PROBLEM — I25.2 HISTORY OF MI (MYOCARDIAL INFARCTION): Status: RESOLVED | Noted: 2023-02-12 | Resolved: 2023-10-10

## 2023-10-10 LAB
ALBUMIN SERPL BCP-MCNC: 3.7 G/DL (ref 3.4–5)
ANION GAP SERPL CALC-SCNC: 14 MMOL/L (ref 10–20)
BUN SERPL-MCNC: 16 MG/DL (ref 6–23)
CALCIUM SERPL-MCNC: 8.2 MG/DL (ref 8.6–10.6)
CHLORIDE SERPL-SCNC: 100 MMOL/L (ref 98–107)
CO2 SERPL-SCNC: 27 MMOL/L (ref 21–32)
CREAT SERPL-MCNC: 0.79 MG/DL (ref 0.5–1.3)
ERYTHROCYTE [DISTWIDTH] IN BLOOD BY AUTOMATED COUNT: 13.5 % (ref 11.5–14.5)
GFR SERPL CREATININE-BSD FRML MDRD: >90 ML/MIN/1.73M*2
GLUCOSE BLD MANUAL STRIP-MCNC: 135 MG/DL (ref 74–99)
GLUCOSE BLD MANUAL STRIP-MCNC: 147 MG/DL (ref 74–99)
GLUCOSE BLD MANUAL STRIP-MCNC: 174 MG/DL (ref 74–99)
GLUCOSE BLD MANUAL STRIP-MCNC: 214 MG/DL (ref 74–99)
GLUCOSE BLD MANUAL STRIP-MCNC: 227 MG/DL (ref 74–99)
GLUCOSE BLD MANUAL STRIP-MCNC: 288 MG/DL (ref 74–99)
GLUCOSE SERPL-MCNC: 119 MG/DL (ref 74–99)
HCT VFR BLD AUTO: 23.8 % (ref 41–52)
HGB BLD-MCNC: 7.9 G/DL (ref 13.5–17.5)
MAGNESIUM SERPL-MCNC: 2.13 MG/DL (ref 1.6–2.4)
MCH RBC QN AUTO: 30.6 PG (ref 26–34)
MCHC RBC AUTO-ENTMCNC: 33.2 G/DL (ref 32–36)
MCV RBC AUTO: 92 FL (ref 80–100)
NRBC BLD-RTO: 0 /100 WBCS (ref 0–0)
PHOSPHATE SERPL-MCNC: 3.2 MG/DL (ref 2.5–4.9)
PLATELET # BLD AUTO: 98 X10*3/UL (ref 150–450)
PMV BLD AUTO: 11.6 FL (ref 7.5–11.5)
POTASSIUM SERPL-SCNC: 3.7 MMOL/L (ref 3.5–5.3)
RBC # BLD AUTO: 2.58 X10*6/UL (ref 4.5–5.9)
SODIUM SERPL-SCNC: 137 MMOL/L (ref 136–145)
WBC # BLD AUTO: 8.8 X10*3/UL (ref 4.4–11.3)

## 2023-10-10 PROCEDURE — 96372 THER/PROPH/DIAG INJ SC/IM: CPT

## 2023-10-10 PROCEDURE — 2500000001 HC RX 250 WO HCPCS SELF ADMINISTERED DRUGS (ALT 637 FOR MEDICARE OP)

## 2023-10-10 PROCEDURE — 2500000001 HC RX 250 WO HCPCS SELF ADMINISTERED DRUGS (ALT 637 FOR MEDICARE OP): Performed by: NURSE PRACTITIONER

## 2023-10-10 PROCEDURE — 2500000002 HC RX 250 W HCPCS SELF ADMINISTERED DRUGS (ALT 637 FOR MEDICARE OP, ALT 636 FOR OP/ED)

## 2023-10-10 PROCEDURE — 85027 COMPLETE CBC AUTOMATED: CPT | Performed by: STUDENT IN AN ORGANIZED HEALTH CARE EDUCATION/TRAINING PROGRAM

## 2023-10-10 PROCEDURE — 2500000004 HC RX 250 GENERAL PHARMACY W/ HCPCS (ALT 636 FOR OP/ED): Performed by: NURSE PRACTITIONER

## 2023-10-10 PROCEDURE — 2500000004 HC RX 250 GENERAL PHARMACY W/ HCPCS (ALT 636 FOR OP/ED)

## 2023-10-10 PROCEDURE — 82947 ASSAY GLUCOSE BLOOD QUANT: CPT

## 2023-10-10 PROCEDURE — 83735 ASSAY OF MAGNESIUM: CPT | Performed by: STUDENT IN AN ORGANIZED HEALTH CARE EDUCATION/TRAINING PROGRAM

## 2023-10-10 PROCEDURE — 99232 SBSQ HOSP IP/OBS MODERATE 35: CPT | Performed by: PHYSICIAN ASSISTANT

## 2023-10-10 PROCEDURE — 2500000004 HC RX 250 GENERAL PHARMACY W/ HCPCS (ALT 636 FOR OP/ED): Performed by: STUDENT IN AN ORGANIZED HEALTH CARE EDUCATION/TRAINING PROGRAM

## 2023-10-10 PROCEDURE — 1200000002 HC GENERAL ROOM WITH TELEMETRY DAILY

## 2023-10-10 PROCEDURE — 99232 SBSQ HOSP IP/OBS MODERATE 35: CPT | Performed by: NURSE PRACTITIONER

## 2023-10-10 PROCEDURE — 71045 X-RAY EXAM CHEST 1 VIEW: CPT | Performed by: RADIOLOGY

## 2023-10-10 PROCEDURE — 84520 ASSAY OF UREA NITROGEN: CPT | Performed by: STUDENT IN AN ORGANIZED HEALTH CARE EDUCATION/TRAINING PROGRAM

## 2023-10-10 PROCEDURE — 71045 X-RAY EXAM CHEST 1 VIEW: CPT

## 2023-10-10 RX ORDER — DEXTROSE MONOHYDRATE 100 MG/ML
0.3 INJECTION, SOLUTION INTRAVENOUS ONCE AS NEEDED
Status: DISCONTINUED | OUTPATIENT
Start: 2023-10-10 | End: 2023-10-12 | Stop reason: HOSPADM

## 2023-10-10 RX ORDER — POTASSIUM CHLORIDE 20 MEQ/1
40 TABLET, EXTENDED RELEASE ORAL ONCE
Status: COMPLETED | OUTPATIENT
Start: 2023-10-10 | End: 2023-10-10

## 2023-10-10 RX ORDER — IRON POLYSACCHARIDE COMPLEX 150 MG
150 CAPSULE ORAL DAILY
Status: DISCONTINUED | OUTPATIENT
Start: 2023-10-10 | End: 2023-10-12 | Stop reason: HOSPADM

## 2023-10-10 RX ORDER — MULTIVIT-MIN/IRON FUM/FOLIC AC 7.5 MG-4
1 TABLET ORAL DAILY
Status: DISCONTINUED | OUTPATIENT
Start: 2023-10-10 | End: 2023-10-12 | Stop reason: HOSPADM

## 2023-10-10 RX ORDER — DEXTROSE 50 % IN WATER (D50W) INTRAVENOUS SYRINGE
25
Status: DISCONTINUED | OUTPATIENT
Start: 2023-10-10 | End: 2023-10-12 | Stop reason: HOSPADM

## 2023-10-10 RX ORDER — INSULIN LISPRO 100 [IU]/ML
3 INJECTION, SOLUTION INTRAVENOUS; SUBCUTANEOUS AS NEEDED
Status: DISCONTINUED | OUTPATIENT
Start: 2023-10-10 | End: 2023-10-12 | Stop reason: HOSPADM

## 2023-10-10 RX ORDER — METOPROLOL TARTRATE 50 MG/1
50 TABLET ORAL 2 TIMES DAILY
Status: DISCONTINUED | OUTPATIENT
Start: 2023-10-10 | End: 2023-10-12 | Stop reason: HOSPADM

## 2023-10-10 RX ORDER — GABAPENTIN 300 MG/1
600 CAPSULE ORAL 4 TIMES DAILY
Status: DISCONTINUED | OUTPATIENT
Start: 2023-10-10 | End: 2023-10-12 | Stop reason: HOSPADM

## 2023-10-10 RX ORDER — FUROSEMIDE 10 MG/ML
20 INJECTION INTRAMUSCULAR; INTRAVENOUS
Status: DISCONTINUED | OUTPATIENT
Start: 2023-10-10 | End: 2023-10-12 | Stop reason: HOSPADM

## 2023-10-10 RX ADMIN — CLOPIDOGREL BISULFATE 75 MG: 75 TABLET ORAL at 09:38

## 2023-10-10 RX ADMIN — HEPARIN SODIUM 5000 UNITS: 5000 INJECTION INTRAVENOUS; SUBCUTANEOUS at 09:40

## 2023-10-10 RX ADMIN — HEPARIN SODIUM 5000 UNITS: 5000 INJECTION INTRAVENOUS; SUBCUTANEOUS at 17:15

## 2023-10-10 RX ADMIN — AMIODARONE HYDROCHLORIDE 400 MG: 200 TABLET ORAL at 21:26

## 2023-10-10 RX ADMIN — ISOSORBIDE MONONITRATE 30 MG: 30 TABLET, EXTENDED RELEASE ORAL at 09:38

## 2023-10-10 RX ADMIN — ASPIRIN 81 MG: 81 TABLET, COATED ORAL at 09:38

## 2023-10-10 RX ADMIN — INSULIN LISPRO 12 UNITS: 100 INJECTION, SOLUTION INTRAVENOUS; SUBCUTANEOUS at 14:04

## 2023-10-10 RX ADMIN — FUROSEMIDE 20 MG: 10 INJECTION, SOLUTION INTRAVENOUS at 17:16

## 2023-10-10 RX ADMIN — FUROSEMIDE 20 MG: 10 INJECTION, SOLUTION INTRAVENOUS at 09:40

## 2023-10-10 RX ADMIN — ACETAMINOPHEN 975 MG: 325 TABLET ORAL at 21:26

## 2023-10-10 RX ADMIN — METOPROLOL TARTRATE 50 MG: 50 TABLET, FILM COATED ORAL at 09:39

## 2023-10-10 RX ADMIN — ACETAMINOPHEN 975 MG: 325 TABLET ORAL at 14:05

## 2023-10-10 RX ADMIN — ATORVASTATIN CALCIUM 80 MG: 80 TABLET, FILM COATED ORAL at 09:38

## 2023-10-10 RX ADMIN — ONDANSETRON 4 MG: 2 INJECTION INTRAMUSCULAR; INTRAVENOUS at 11:38

## 2023-10-10 RX ADMIN — DOCUSATE SODIUM 100 MG: 100 CAPSULE, LIQUID FILLED ORAL at 09:39

## 2023-10-10 RX ADMIN — AMIODARONE HYDROCHLORIDE 400 MG: 200 TABLET ORAL at 14:10

## 2023-10-10 RX ADMIN — AMIODARONE HYDROCHLORIDE 400 MG: 200 TABLET ORAL at 09:38

## 2023-10-10 RX ADMIN — Medication 150 MG: at 11:27

## 2023-10-10 RX ADMIN — INSULIN GLARGINE 60 UNITS: 100 INJECTION, SOLUTION SUBCUTANEOUS at 11:27

## 2023-10-10 RX ADMIN — POLYETHYLENE GLYCOL 3350 17 G: 17 POWDER, FOR SOLUTION ORAL at 14:10

## 2023-10-10 RX ADMIN — POTASSIUM CHLORIDE 40 MEQ: 1500 TABLET, EXTENDED RELEASE ORAL at 11:27

## 2023-10-10 RX ADMIN — ACETAMINOPHEN 975 MG: 325 TABLET ORAL at 06:42

## 2023-10-10 RX ADMIN — GABAPENTIN 600 MG: 300 CAPSULE ORAL at 14:05

## 2023-10-10 RX ADMIN — Medication 1 TABLET: at 11:26

## 2023-10-10 RX ADMIN — GABAPENTIN 600 MG: 300 CAPSULE ORAL at 07:39

## 2023-10-10 RX ADMIN — GABAPENTIN 600 MG: 300 CAPSULE ORAL at 21:26

## 2023-10-10 RX ADMIN — POLYETHYLENE GLYCOL 3350 17 G: 17 POWDER, FOR SOLUTION ORAL at 09:47

## 2023-10-10 RX ADMIN — METOPROLOL TARTRATE 50 MG: 50 TABLET, FILM COATED ORAL at 21:26

## 2023-10-10 ASSESSMENT — COGNITIVE AND FUNCTIONAL STATUS - GENERAL
TOILETING: A LITTLE
DAILY ACTIVITIY SCORE: 19
MOVING TO AND FROM BED TO CHAIR: A LITTLE
STANDING UP FROM CHAIR USING ARMS: A LITTLE
DRESSING REGULAR UPPER BODY CLOTHING: A LITTLE
HELP NEEDED FOR BATHING: A LITTLE
MOBILITY SCORE: 18
CLIMB 3 TO 5 STEPS WITH RAILING: A LITTLE
TURNING FROM BACK TO SIDE WHILE IN FLAT BAD: A LITTLE
WALKING IN HOSPITAL ROOM: A LITTLE
MOVING FROM LYING ON BACK TO SITTING ON SIDE OF FLAT BED WITH BEDRAILS: A LITTLE
PERSONAL GROOMING: A LITTLE
DRESSING REGULAR LOWER BODY CLOTHING: A LITTLE

## 2023-10-10 ASSESSMENT — ENCOUNTER SYMPTOMS
SORE THROAT: 0
JOINT SWELLING: 0
POLYPHAGIA: 0
LIGHT-HEADEDNESS: 0
FATIGUE: 1
CHEST TIGHTNESS: 0
FREQUENCY: 0
VOMITING: 0
POLYDIPSIA: 0
DYSURIA: 0
NAUSEA: 0
DIARRHEA: 0
EYE PAIN: 0
HEADACHES: 0
UNEXPECTED WEIGHT CHANGE: 0
NUMBNESS: 0
TROUBLE SWALLOWING: 0
ACTIVITY CHANGE: 0
COUGH: 1
ABDOMINAL PAIN: 0
DIAPHORESIS: 0
PALPITATIONS: 0
SHORTNESS OF BREATH: 0
CONFUSION: 0
EYE REDNESS: 0
AGITATION: 0
DIZZINESS: 0
APPETITE CHANGE: 0
BRUISES/BLEEDS EASILY: 0

## 2023-10-10 ASSESSMENT — PAIN SCALES - GENERAL
PAINLEVEL_OUTOF10: 0 - NO PAIN
PAINLEVEL_OUTOF10: 2
PAINLEVEL_OUTOF10: 4

## 2023-10-10 ASSESSMENT — PAIN - FUNCTIONAL ASSESSMENT
PAIN_FUNCTIONAL_ASSESSMENT: 0-10
PAIN_FUNCTIONAL_ASSESSMENT: 0-10

## 2023-10-10 NOTE — SIGNIFICANT EVENT
Patient HDS and without complaint. Ok to transfer to LT3 stepdown.    Vitals:    10/09/23 2100   BP: 121/70   Pulse: 89   Resp: 17   Temp: 36.7 °C (98.1 °F)   SpO2:          Discussed with attending Dr. Delbert Tan MD

## 2023-10-10 NOTE — CARE PLAN
Problem: Skin  Goal: Decreased wound size/increased tissue granulation at next dressing change  Outcome: Progressing  Goal: Participates in plan/prevention/treatment measures  Outcome: Progressing  Goal: Promote/optimize nutrition  Outcome: Progressing  Goal: Promote skin healing  Outcome: Progressing     Problem: Diabetes  Goal: Achieve decreasing blood glucose levels by end of shift  Outcome: Progressing  Goal: Increase stability of blood glucose readings by end of shift  Outcome: Progressing  Goal: Maintain electrolyte levels within acceptable range throughout shift  Outcome: Progressing  Goal: Maintain glucose levels >70mg/dl to <250mg/dl throughout shift  Outcome: Progressing     Problem: Mechanical Ventilation  Goal: Patient Will Maintain Patent Airway  Outcome: Progressing  Goal: Oral health is maintained or improved  Outcome: Progressing  Goal: Tracheostomy will be managed safely  Outcome: Progressing  Goal: ET tube will be managed safely  Outcome: Progressing  Goal: Ability to express needs and understand communication  Outcome: Progressing  Goal: Mobility/activity is maintained at optimum level for patient  Outcome: Progressing     Problem: Safety - Adult  Goal: Free from fall injury  Outcome: Progressing     Problem: Discharge Planning  Goal: Discharge to home or other facility with appropriate resources  Outcome: Progressing

## 2023-10-10 NOTE — HOSPITAL COURSE
Tim Kenney is a 58-year-old male with a hx of IDDM (insulin pump), LENNY on CPAP, HTN, CAD, MI. He was asymptomatic until a month ago when he started having chest pain. He was seen by his cardiologist, cardiac cath demonstrated LAD  severe circumflex and severe RCA disease. He was sent to Dr. Lianne Portillo for evaluation in consideration of bypass surgery. Patient presented for CABG 10/6/2023.    10/6/2023 Procedures - Dr Behzad Portillo  1.  CABG x4, with bilateral mammary, LIMA to LAD, DINORAH as a free graft to obtuse marginal, radial to diagonal and saphenous vein graft to distal PDA.  2.  Endoscopic vein and radial harvest .  3.  The patient agreed to participate on our ongoing LeAAPs trial so he may or may not receive the left appendage clip.    CTICU course - afib - amio gtt; Endo consulted for DM management    Transferred to the floor 10/9  _________________________    Hospital/ floor  Course:  - patient was diuresis for fluid volume overload post cardiac surgery; preop wt: 120 kg, discharge wt: 123kg   - electrolytes were replaced as necessary   - pain and constipation meds given  - continued ASA, statin, BB, plavix  - continue amiodarone for postop afib  - continue Imdur x1 month for LIMA/DINORAH and radial artery graft  - encouraged patient to take bowel regimen for postop constipation  - endocrinology followed for diabetes management as patient is Type 1 with insulin pump; his sugars have been poorly controlled in the 200s-250s.  We recommend improved glucose control due to risk of sternal infection.   - Epicardial wires CUT on  10/12  - 2 view CXR done on 10/11  - PT recs for home  - anticipate discharge home with homecare     Discharged home 10/12      On day of discharge, vital signs were stable and no acute distress was noted. All questions were and answered and much support was given. After VS and labs were reviewed it was determined the patient was stable for discharge.     Hospital day of discharge  management- spent >30 minutes coordinating the discharge and counseling/educating patient and family regarding discharge instructions.         PMH:   Angina pectoris (CMS/HCC)    · C. difficile colitis      dx 7/30/23  · Coronary artery disease      C 7/25/23: % ISR + collat, 1st diag 75%, LCx mid 95%, RCA mid 50%, PDA distal 90%, LVEF 45%  · Diabetes mellitus (CMS/[facility])      takes insulin and semaglutide/Rybelsus  · Hyperlipidemia    · Hypertension    · Joint pain      ankle, Charcot foot  · Old myocardial infarction      History of myocardial infarction  · Sleep apnea      + CPAP    Surgical History  None   Allergies: Iodinated contrast media    Home meds  Allopurinol; 300 mg daily   ASA 81 mg daily   Statin 40 mg daily   Gabapentin 600 mg 5 times a day   Insulin Aspart 100 unit/ml use via pump , max dose 200 units   Imdur 30mg daily   Lisinopril 2.5 mg daily   Semiglutide 7 mg tab;et   Testosterone 50 mg transdermal patch twice a day

## 2023-10-10 NOTE — PROGRESS NOTES
Tim Kenney is a 58 y.o. male on day 4 of admission presenting with Coronary artery disease of native artery of native heart with stable angina pectoris (CMS/[facility]).    Subjective   Patient restarted his insulin pump when his sister brought supplies from home with our endocrine provider oversight.  He reports his glucose was in 260s at time of pump restart.  Of note, pt uses large amt of basal insulin (avg 71u/d) and very little bolus insulin (avg of 7.9u/d)- pt states he rarely eats at home due to no appetite.  I have reviewed histories, allergies and medications have been reviewed        Objective   Review of Systems   Constitutional:  Positive for fatigue. Negative for activity change, appetite change, diaphoresis and unexpected weight change.   HENT:  Negative for congestion, sore throat and trouble swallowing.    Eyes:  Negative for pain, redness and visual disturbance.   Respiratory:  Positive for cough. Negative for chest tightness and shortness of breath.    Cardiovascular:  Positive for chest pain (appropriate s/p cabg). Negative for palpitations and leg swelling.   Gastrointestinal:  Negative for abdominal pain, diarrhea, nausea and vomiting.   Endocrine: Negative for cold intolerance, heat intolerance, polydipsia, polyphagia and polyuria.   Genitourinary:  Negative for dysuria, frequency and urgency.   Musculoskeletal:  Negative for gait problem and joint swelling.   Skin:  Negative for pallor and rash.   Allergic/Immunologic: Negative for immunocompromised state.   Neurological:  Negative for dizziness, light-headedness, numbness and headaches.   Hematological:  Does not bruise/bleed easily.   Psychiatric/Behavioral:  Negative for agitation, behavioral problems and confusion.    All other systems reviewed and are negative.    Physical Exam  Vitals reviewed.   Constitutional:       General: He is not in acute distress.     Appearance: Normal appearance.   HENT:      Head: Normocephalic and atraumatic.       "Nose: Nose normal.      Mouth/Throat:      Mouth: Mucous membranes are moist.   Eyes:      Extraocular Movements: Extraocular movements intact.      Conjunctiva/sclera: Conjunctivae normal.      Pupils: Pupils are equal, round, and reactive to light.   Cardiovascular:      Pulses: Normal pulses.      Comments: Sternotomy scar, chest tube  Pulmonary:      Effort: Pulmonary effort is normal. No respiratory distress.   Abdominal:      General: Abdomen is flat. There is no distension.   Musculoskeletal:         General: Normal range of motion.   Skin:     General: Skin is warm and dry.      Findings: No rash.   Neurological:      Mental Status: He is alert and oriented to person, place, and time.   Psychiatric:         Mood and Affect: Mood normal.         Behavior: Behavior normal.         Last Recorded Vitals  Blood pressure 111/67, pulse 74, temperature 36.3 °C (97.3 °F), temperature source Temporal, resp. rate 18, height 1.88 m (6' 2\"), weight 123 kg (272 lb 0.8 oz), SpO2 95 %.  Intake/Output last 3 Shifts:  I/O last 3 completed shifts:  In: 1877.5 (15.9 mL/kg) [P.O.:610; I.V.:917.5 (7.8 mL/kg); IV Piggyback:350]  Out: 1270 (10.8 mL/kg) [Urine:1270 (0.3 mL/kg/hr)]  Dosing Weight: 118 kg     Relevant Results  Results from last 7 days   Lab Units 10/10/23  1332 10/10/23  0730 10/10/23  0629 10/10/23  0612 10/10/23  0004 10/09/23  1934 10/09/23  0445 10/09/23  0210 10/08/23  1835 10/08/23  1414 10/08/23  0419 10/08/23  0155 10/07/23  1248 10/07/23  1241   POCT GLUCOSE mg/dL 288* 147*  --  135* 174* 125*   < >  --    < >  --    < >  --    < >  --    GLUCOSE mg/dL  --   --  119*  --   --   --   --  189*  --  248*  --  126*  --  128*    < > = values in this interval not displayed.           Results from last 7 days   Lab Units 10/10/23  0629   SODIUM mmol/L 137   POTASSIUM mmol/L 3.7   CHLORIDE mmol/L 100   CO2 mmol/L 27   BUN mg/dL 16   CREATININE mg/dL 0.79   CALCIUM mg/dL 8.2*   ALBUMIN g/dL 3.7   GLUCOSE mg/dL 119* "       Lab Results   Component Value Date    HGBA1C 8.0 (A) 09/25/2023     Scheduled medications  acetaminophen, 975 mg, oral, q8h LEATHA  amiodarone, 400 mg, oral, TID  aspirin, 81 mg, oral, Daily  atorvastatin, 80 mg, oral, Daily  clopidogrel, 75 mg, oral, Daily  docusate sodium, 100 mg, oral, BID  furosemide, 20 mg, intravenous, BID after meals  gabapentin, 600 mg, oral, 4x daily  heparin (porcine), 5,000 Units, subcutaneous, q8h  Insulin, , pump - subcutaneous, Continuous Pump  iron polysaccharides, 150 mg, oral, Daily  isosorbide mononitrate ER, 30 mg, oral, Daily  metoprolol tartrate, 50 mg, oral, BID  multivitamin with minerals, 1 tablet, oral, Daily  polyethylene glycol, 17 g, oral, TID      Continuous medications       PRN medications  PRN medications: bisacodyl, dextrose 10 % in water (D10W), dextrose, glucagon, insulin lispro, ondansetron, oxyCODONE, oxygen  Tim Kenney is a 58-year-old male with a hx of IDDM, LENNY on CPAP, HTN, CAD, MI. He was asymptomatic until a month ago when he started having chest pain. He was seen by his cardiologist, cardiac cath demonstrated LAD  severe circumflex and severe RCA disease. He was sent to Dr. Lianne Portillo for evaluation in consideration of bypass surgery. Patient is s/p CABG x4 with Dr Lianne Portillo.      ASSESSMENT -T2DM s/p CABG  Diabetes History  Outpatient provider for endocrine care Dr. Rosenthal at [facility], date of last visit 8/23/23  Initial diabetes diagnosis was made 20 years ago. Pt has been on insulin pump (medtronic 770G) for past 2 years  Known complications due to diabetes include: peripheral neuropathy, CAD s/p CABG x4, obesity, and hyperglycemia     Home Management  Pt states he uses medtronic 770g insulin pump with guardian sensor, as well as rybelsus 10mg daily. Pt has 780G software at home but has not yet upgraded his pump.      10/7- Pt states he changes his site every 3 days, and fills his pump with approx 300u of insulin.  CareEverywhere reviewed, no  insulin pump settings listed in notes from [facility]  Pt does not know his insulin pump settings, does not have his pump with him, and states he does not remember how much insulin he took when he was on injections.     Pt with high insulin requirements on gtt s/p CABG d/t post-operative stress state and pain causing hyperglycemia. Pt was also on pressors and multiple medications mixed with dextrose, insulin gtt able to titrate down quickly once those meds were discontinued. Will transition pt to subcutaneous insulin based on insulin gtt requirments as well as weight based insulin dosing. Pt agreeable to plan      10/8- Pump Settings  Basal Rates: 3.5u/h - TDB 85u (71u is 30 day average in automode)  Insulin to Carb Ratio: 1u:3g carbs   Insulin Sensitivity Factor: 1u: 6 glc pts  Targets: 100-120  Duration of Insulin Action: 3h  Of note, pt uses large amt of basal insulin (avg 71u/d) and very little bolus insulin (avg of 7.9u/d)- pt states he rarely eats at home due to no appetite.     PLAN-  - Goal BG <180  -Stop all injected insulins, patient will provide all of his own insulin dosing via his insulin pump  Patient will need to change his insulin pump infusion set on October 13, patient will likely be home at the time that this changes he is due.  Should patient have need to change infusion set while admitted, but nursing only to hand patient vial of Humalog via as needed order      -diabetes diet 60g CHO per meal  -Accuchecks q4h on subcutaneous regimen  -Hypoglycemia protocol  -Will continue to follow and titrate insulin accordingly     Pt signed insulin pump consent form- signed 10/8, placed in pt chart       I spent 40 minutes in the professional and overall care of this patient.      Lucy Mora PA-C

## 2023-10-10 NOTE — PROGRESS NOTES
" CARDIAC SURGERY DAILY PROGRESS NOTE    Tim Kenney is a 58-year-old male with a hx of IDDM (insulin pump), LENNY on CPAP, HTN, CAD, MI. He was asymptomatic until a month ago when he started having chest pain. He was seen by his cardiologist, cardiac cath demonstrated LAD  severe circumflex and severe RCA disease. He was sent to Dr. Lianne Portillo for evaluation in consideration of bypass surgery. Patient presented for CABG 10/6/2023.    10/6/2023 Procedures - Dr Behzad Portillo  1.  CABG x4, with bilateral mammary, LIMA to LAD, DINORAH as a free graft to obtuse marginal, radial to diagonal and saphenous vein graft to distal PDA.  2.  Endoscopic vein and radial harvest .  3.  The patient agreed to participate on our ongoing LeAAPs trial so he may or may not receive the left appendage clip.    CTICU course - afib - amio gtt; Endo consulted for DM management    Transferred to the #3 SDU 10/9.  _________________________    Subjective   Pt laying in bed with cpap on. States he feels tired and denies any pain     Objective   /74 (BP Location: Left arm, Patient Position: Lying)   Pulse 74   Temp 36.8 °C (98.2 °F) (Temporal)   Resp 11   Ht 1.88 m (6' 2\")   Wt 123 kg (272 lb 0.8 oz)   SpO2 94%   BMI 34.93 kg/m²   Pain Score: 2   3 Day Weight Change: -0.633 kg (-1 lb 6.3 oz) per day    Heart Rate:  []   Temp:  [36.7 °C (98.1 °F)-36.9 °C (98.4 °F)]   Resp:  [9-24]   BP: (109-154)/()   Weight:  [118 kg (260 lb 2.3 oz)-123 kg (272 lb 0.8 oz)]   SpO2:  [92 %-97 %]      Intake and Output    Intake/Output Summary (Last 24 hours) at 10/10/2023 1013  Last data filed at 10/10/2023 0738  Gross per 24 hour   Intake 834.89 ml   Output 325 ml   Net 509.89 ml       Physical Exam  Physical Exam  Constitutional:       Appearance: Normal appearance.      Comments: Awake no distress, alert and cooperative. Laying in bed   HENT:      Head: Normocephalic and atraumatic.      Comments: no apparent injury, no lesions seen     " Mouth/Throat:      Mouth: Mucous membranes are moist.      Pharynx: Oropharynx is clear.   Eyes:      Extraocular Movements: Extraocular movements intact.      Conjunctiva/sclera: Conjunctivae normal.   Neck:      Comments: trachea midline, no bruits  Cardiovascular:      Comments:  NSR Rate    Regular, rate and rhythm, no murmurs, 2+ equal pulses of the extremities, normal S 1and S 2  AV pacer wires capped    Pulmonary:      Effort: Pulmonary effort is normal.      Comments: Patent airways, diminished breath sounds bilaterally on the bases with good chest expansion, thorax symmetric On RA  Sternum stable    Abdominal:      Comments: No distended, soft, non-tender,  +BS, Awaiting post op BM   Genitourinary:     Comments: Voids independently  Musculoskeletal:         General: Normal range of motion.      Cervical back: Neck supple.      Comments: HOLLAND, deconditioning    Skin:     Capillary Refill: Capillary refill takes 2 to 3 seconds.      Comments: Midsternal, RAFA, well approx. w/ derma thompson, NO  s/s infection erythema, oozing or hematoma  Right  SVG Site NO  s/s infection erythema, oozing or hematoma   Radial graft well approximated, no oozing, no hematoma, no s/sx of infections     Neurological:      General: No focal deficit present.      Mental Status: He is alert and oriented to person, place, and time.   Psychiatric:         Mood and Affect: Mood normal.         Behavior: Behavior normal.         Judgment: Judgment normal.         Medications  Scheduled medications  acetaminophen, 975 mg, oral, q8h LEATHA  amiodarone, 400 mg, oral, TID  aspirin, 81 mg, oral, Daily  atorvastatin, 80 mg, oral, Daily  clopidogrel, 75 mg, oral, Daily  docusate sodium, 100 mg, oral, BID  furosemide, 20 mg, intravenous, BID after meals  gabapentin, 600 mg, oral, 4x daily  heparin (porcine), 5,000 Units, subcutaneous, q8h  insulin glargine, 60 Units, subcutaneous, q24h  insulin lispro, 0-20 Units, subcutaneous, q4h  insulin  lispro, 10 Units, subcutaneous, TID with meals  isosorbide mononitrate ER, 30 mg, oral, Daily  metoprolol tartrate, 50 mg, oral, BID  polyethylene glycol, 17 g, oral, TID    Continuous medications   PRN medications  PRN medications: bisacodyl, ondansetron, oxyCODONE, oxygen    Labs  Results for orders placed or performed during the hospital encounter of 10/06/23 (from the past 24 hour(s))   POCT GLUCOSE   Result Value Ref Range    POCT Glucose 250 (H) 74 - 99 mg/dL   POCT GLUCOSE   Result Value Ref Range    POCT Glucose 210 (H) 74 - 99 mg/dL   POCT GLUCOSE   Result Value Ref Range    POCT Glucose 125 (H) 74 - 99 mg/dL   POCT GLUCOSE   Result Value Ref Range    POCT Glucose 174 (H) 74 - 99 mg/dL   POCT GLUCOSE   Result Value Ref Range    POCT Glucose 135 (H) 74 - 99 mg/dL   CBC   Result Value Ref Range    WBC 8.8 4.4 - 11.3 x10*3/uL    nRBC 0.0 0.0 - 0.0 /100 WBCs    RBC 2.58 (L) 4.50 - 5.90 x10*6/uL    Hemoglobin 7.9 (L) 13.5 - 17.5 g/dL    Hematocrit 23.8 (L) 41.0 - 52.0 %    MCV 92 80 - 100 fL    MCH 30.6 26.0 - 34.0 pg    MCHC 33.2 32.0 - 36.0 g/dL    RDW 13.5 11.5 - 14.5 %    Platelets 98 (L) 150 - 450 x10*3/uL    MPV 11.6 (H) 7.5 - 11.5 fL   Magnesium   Result Value Ref Range    Magnesium 2.13 1.60 - 2.40 mg/dL   Renal Function Panel   Result Value Ref Range    Glucose 119 (H) 74 - 99 mg/dL    Sodium 137 136 - 145 mmol/L    Potassium 3.7 3.5 - 5.3 mmol/L    Chloride 100 98 - 107 mmol/L    Bicarbonate 27 21 - 32 mmol/L    Anion Gap 14 10 - 20 mmol/L    Urea Nitrogen 16 6 - 23 mg/dL    Creatinine 0.79 0.50 - 1.30 mg/dL    eGFR >90 >60 mL/min/1.73m*2    Calcium 8.2 (L) 8.6 - 10.6 mg/dL    Phosphorus 3.2 2.5 - 4.9 mg/dL    Albumin 3.7 3.4 - 5.0 g/dL   POCT GLUCOSE   Result Value Ref Range    POCT Glucose 147 (H) 74 - 99 mg/dL           IMPRESSION & PLAN:  POD # 4 s/p cabgx4 Vein and radial harvest   - Increase activity/ ambulation; PT/OT  - Encourage IS, C/DB; respiratory therapy; wean O2 as catia   - Cardiac rehab  referral   - Continue cardiac meds: ASA, BB, statin  - Imdur for radial graft   - Pain and anticonstipation meds  - 2v CXR ordered for 10/11  - CUT epicardial wires prior to discharge   - Tele until discharge  - Optimize nutrition and electrolytes    Rhythm  Post op AFIB while in ICU treated with Digoxin at the time,  Amiodarone bolus and infusion. Transition to PO amiodarone. Dig stopped.   - Tele: NSR HR   - Continue Metoprolol 50 mg BID., titrate as needed   - Epicardial wires connected to pacer and pacer is off   - Continue Amiodarone  mg BID, consider reducing dose to 200 mg daily at discharge   - Adjust medications as tolerated    Acute Blood Loss Anemia   Hematocrit   Date Value Ref Range Status   10/10/2023 23.8 (L) 41.0 - 52.0 % Final   10/09/2023 23.3 (L) 41.0 - 52.0 % Final   10/08/2023 25.1 (L) 41.0 - 52.0 % Final   10/08/2023 24.9 (L) 41.0 - 52.0 % Final   10/07/2023 25.2 (L) 41.0 - 52.0 % Final   - MV, PO Iron x1mo  - Daily labs, transfuse as indicated    Thrombocytopenia  Platelets   Date Value Ref Range Status   10/10/2023 98 (L) 150 - 450 x10*3/uL Final   10/09/2023 86 (L) 150 - 450 x10*3/uL Final   10/08/2023 90 (L) 150 - 450 x10*3/uL Final   10/08/2023 76 (L) 150 - 450 x10*3/uL Final   10/07/2023 100 (L) 150 - 450 x10*3/uL Final   - Etiology likely postop/CPB related  - Continue to trend with daily CBCs    Volume/Electrolyte Status: Preop wt 120 kg    - Weight: 123 Kg   - Lasix  IV 20 mg BID   - Adjust diuresis as needed for postop cardiac surgery hypervolemia  - Replete electrolytes for hypokalemia/hypomagnesemia/hypophosphatemia as needed. K replaced on 10/10  - Daily weights and strict I&Os  - Daily RFP while admitted    Hypertension  - SBP last 24 hours 109-154  - continue BB  -Consider ACEis/ ARBS once bp stable    Hyperlipidemia:   -continue statin  -follow up lipid panel with PCP/ cardio as appropriate    DM type II On Insulin Pump , Hg A1C 8.0, on 9/25/23  - Endocrinology for  preop optimization DM management  - Pt may expect to restart insulin pump once the following conditions are met:  ----1. Pt has pump and all supplies for pump brought in from home- brought in by sister  ----2. Pt signed insulin pump consent form- signed 10/8, placed in pt chart  ----3. Pt is out of the ICU  - accuchecks premeal and at bedtime  - diabetic diet 60g CHO   - lispro premeal corrective scale  - Accuchecks achs   - Hypoglycemic protocol     LENNY  -aggressive bronchial hygiene  -wean O2 as tolerated  -ABGs PRN  -continue home CPAP     Prophylaxis   DVT Prophylaxis     - SCDs/TEDS     - Ambulation/OOB     - Heparin SQ =    Code Status: Full Code    Dispo  - PT/OT recs Home   - Would benefit from homecare for [facility] carepath and RN visits  - Anticipate discharge 1-2 days  days, pending CXR, diuresis and increased in activity level   - Will continue to assess discharge needs    LISA William-CNP  Cardiac Surgery [address]VE  [facility]  Team Pager [phone]     10/10/2023  10:13 AM

## 2023-10-10 NOTE — DISCHARGE INSTRUCTIONS
“Keep Your Move in the Tube!!”   Please refer to the “Move in the Tube” handout.  -- Load bearing activities can be completed if you are “staying in the tube.” If you are attempting load bearing activities, let pain be your guide with when trying an activity “out of the tube”. If an activity hurts or is uncomfortable go back to doing it while you stay “in the tube”. There is no time limit to “stay in the tube”.     -- Non-load bearing activities, which are your activities of daily living, can be completed with your arms “out of the tube” as long as you remain pain free. Some activities of daily living examples are dressing, personal care, showering, washing hair, and toilet hygiene.     Don't forget to KEEP YOUR MOVE IN THE TUBE and think of a [employer]G [place] dinosaur!

## 2023-10-11 ENCOUNTER — APPOINTMENT (OUTPATIENT)
Dept: RADIOLOGY | Facility: HOSPITAL | Age: 58
DRG: 236 | End: 2023-10-11
Payer: COMMERCIAL

## 2023-10-11 DIAGNOSIS — Z95.1 S/P CABG X 4: ICD-10-CM

## 2023-10-11 LAB
ALBUMIN SERPL BCP-MCNC: 3.7 G/DL (ref 3.4–5)
ANION GAP SERPL CALC-SCNC: 13 MMOL/L (ref 10–20)
BUN SERPL-MCNC: 18 MG/DL (ref 6–23)
CALCIUM SERPL-MCNC: 8.3 MG/DL (ref 8.6–10.6)
CHLORIDE SERPL-SCNC: 96 MMOL/L (ref 98–107)
CO2 SERPL-SCNC: 30 MMOL/L (ref 21–32)
CREAT SERPL-MCNC: 0.85 MG/DL (ref 0.5–1.3)
ERYTHROCYTE [DISTWIDTH] IN BLOOD BY AUTOMATED COUNT: 13.7 % (ref 11.5–14.5)
GFR SERPL CREATININE-BSD FRML MDRD: >90 ML/MIN/1.73M*2
GLUCOSE BLD MANUAL STRIP-MCNC: 183 MG/DL (ref 74–99)
GLUCOSE BLD MANUAL STRIP-MCNC: 219 MG/DL (ref 74–99)
GLUCOSE BLD MANUAL STRIP-MCNC: 268 MG/DL (ref 74–99)
GLUCOSE BLD MANUAL STRIP-MCNC: 280 MG/DL (ref 74–99)
GLUCOSE SERPL-MCNC: 154 MG/DL (ref 74–99)
HCT VFR BLD AUTO: 24.8 % (ref 41–52)
HGB BLD-MCNC: 8.1 G/DL (ref 13.5–17.5)
MAGNESIUM SERPL-MCNC: 2.17 MG/DL (ref 1.6–2.4)
MCH RBC QN AUTO: 29.9 PG (ref 26–34)
MCHC RBC AUTO-ENTMCNC: 32.7 G/DL (ref 32–36)
MCV RBC AUTO: 92 FL (ref 80–100)
NRBC BLD-RTO: 0 /100 WBCS (ref 0–0)
PHOSPHATE SERPL-MCNC: 2.8 MG/DL (ref 2.5–4.9)
PLATELET # BLD AUTO: 132 X10*3/UL (ref 150–450)
PMV BLD AUTO: 11.7 FL (ref 7.5–11.5)
POTASSIUM SERPL-SCNC: 4 MMOL/L (ref 3.5–5.3)
RBC # BLD AUTO: 2.71 X10*6/UL (ref 4.5–5.9)
SODIUM SERPL-SCNC: 135 MMOL/L (ref 136–145)
WBC # BLD AUTO: 7.7 X10*3/UL (ref 4.4–11.3)

## 2023-10-11 PROCEDURE — 96372 THER/PROPH/DIAG INJ SC/IM: CPT

## 2023-10-11 PROCEDURE — 2500000002 HC RX 250 W HCPCS SELF ADMINISTERED DRUGS (ALT 637 FOR MEDICARE OP, ALT 636 FOR OP/ED): Performed by: NURSE PRACTITIONER

## 2023-10-11 PROCEDURE — 1200000002 HC GENERAL ROOM WITH TELEMETRY DAILY

## 2023-10-11 PROCEDURE — 71046 X-RAY EXAM CHEST 2 VIEWS: CPT | Performed by: RADIOLOGY

## 2023-10-11 PROCEDURE — 80069 RENAL FUNCTION PANEL: CPT | Performed by: STUDENT IN AN ORGANIZED HEALTH CARE EDUCATION/TRAINING PROGRAM

## 2023-10-11 PROCEDURE — 83735 ASSAY OF MAGNESIUM: CPT | Performed by: STUDENT IN AN ORGANIZED HEALTH CARE EDUCATION/TRAINING PROGRAM

## 2023-10-11 PROCEDURE — 2500000004 HC RX 250 GENERAL PHARMACY W/ HCPCS (ALT 636 FOR OP/ED)

## 2023-10-11 PROCEDURE — 74018 RADEX ABDOMEN 1 VIEW: CPT | Performed by: RADIOLOGY

## 2023-10-11 PROCEDURE — 2500000004 HC RX 250 GENERAL PHARMACY W/ HCPCS (ALT 636 FOR OP/ED): Performed by: STUDENT IN AN ORGANIZED HEALTH CARE EDUCATION/TRAINING PROGRAM

## 2023-10-11 PROCEDURE — 2500000004 HC RX 250 GENERAL PHARMACY W/ HCPCS (ALT 636 FOR OP/ED): Performed by: NURSE PRACTITIONER

## 2023-10-11 PROCEDURE — 2500000001 HC RX 250 WO HCPCS SELF ADMINISTERED DRUGS (ALT 637 FOR MEDICARE OP)

## 2023-10-11 PROCEDURE — 74018 RADEX ABDOMEN 1 VIEW: CPT | Mod: FY

## 2023-10-11 PROCEDURE — 71046 X-RAY EXAM CHEST 2 VIEWS: CPT

## 2023-10-11 PROCEDURE — 99254 IP/OBS CNSLTJ NEW/EST MOD 60: CPT

## 2023-10-11 PROCEDURE — 85027 COMPLETE CBC AUTOMATED: CPT | Performed by: STUDENT IN AN ORGANIZED HEALTH CARE EDUCATION/TRAINING PROGRAM

## 2023-10-11 PROCEDURE — 99232 SBSQ HOSP IP/OBS MODERATE 35: CPT | Performed by: NURSE PRACTITIONER

## 2023-10-11 PROCEDURE — 2500000002 HC RX 250 W HCPCS SELF ADMINISTERED DRUGS (ALT 637 FOR MEDICARE OP, ALT 636 FOR OP/ED)

## 2023-10-11 PROCEDURE — 2500000001 HC RX 250 WO HCPCS SELF ADMINISTERED DRUGS (ALT 637 FOR MEDICARE OP): Performed by: NURSE PRACTITIONER

## 2023-10-11 PROCEDURE — 82947 ASSAY GLUCOSE BLOOD QUANT: CPT

## 2023-10-11 RX ORDER — OXYCODONE HYDROCHLORIDE 5 MG/1
5 TABLET ORAL EVERY 6 HOURS PRN
Status: DISCONTINUED | OUTPATIENT
Start: 2023-10-11 | End: 2023-10-12 | Stop reason: HOSPADM

## 2023-10-11 RX ORDER — AMIODARONE HYDROCHLORIDE 200 MG/1
400 TABLET ORAL 2 TIMES DAILY
Status: DISCONTINUED | OUTPATIENT
Start: 2023-10-11 | End: 2023-10-12 | Stop reason: HOSPADM

## 2023-10-11 RX ORDER — BISACODYL 10 MG/1
10 SUPPOSITORY RECTAL DAILY
Status: DISCONTINUED | OUTPATIENT
Start: 2023-10-11 | End: 2023-10-12 | Stop reason: HOSPADM

## 2023-10-11 RX ADMIN — ACETAMINOPHEN 975 MG: 325 TABLET ORAL at 21:23

## 2023-10-11 RX ADMIN — METOPROLOL TARTRATE 50 MG: 50 TABLET, FILM COATED ORAL at 09:17

## 2023-10-11 RX ADMIN — Medication 1 TABLET: at 09:17

## 2023-10-11 RX ADMIN — FUROSEMIDE 20 MG: 10 INJECTION, SOLUTION INTRAVENOUS at 17:41

## 2023-10-11 RX ADMIN — ATORVASTATIN CALCIUM 80 MG: 80 TABLET, FILM COATED ORAL at 09:17

## 2023-10-11 RX ADMIN — METOPROLOL TARTRATE 50 MG: 50 TABLET, FILM COATED ORAL at 21:23

## 2023-10-11 RX ADMIN — AMIODARONE HYDROCHLORIDE 400 MG: 200 TABLET ORAL at 09:17

## 2023-10-11 RX ADMIN — GABAPENTIN 600 MG: 300 CAPSULE ORAL at 17:41

## 2023-10-11 RX ADMIN — HEPARIN SODIUM 5000 UNITS: 5000 INJECTION INTRAVENOUS; SUBCUTANEOUS at 09:16

## 2023-10-11 RX ADMIN — GABAPENTIN 600 MG: 300 CAPSULE ORAL at 13:31

## 2023-10-11 RX ADMIN — Medication 150 MG: at 09:17

## 2023-10-11 RX ADMIN — DOCUSATE SODIUM 100 MG: 100 CAPSULE, LIQUID FILLED ORAL at 09:17

## 2023-10-11 RX ADMIN — POLYETHYLENE GLYCOL 3350 17 G: 17 POWDER, FOR SOLUTION ORAL at 09:17

## 2023-10-11 RX ADMIN — ASPIRIN 81 MG: 81 TABLET, COATED ORAL at 09:17

## 2023-10-11 RX ADMIN — GABAPENTIN 600 MG: 300 CAPSULE ORAL at 21:23

## 2023-10-11 RX ADMIN — AMIODARONE HYDROCHLORIDE 400 MG: 200 TABLET ORAL at 21:23

## 2023-10-11 RX ADMIN — DOCUSATE SODIUM 100 MG: 100 CAPSULE, LIQUID FILLED ORAL at 21:24

## 2023-10-11 RX ADMIN — ISOSORBIDE MONONITRATE 30 MG: 30 TABLET, EXTENDED RELEASE ORAL at 09:17

## 2023-10-11 RX ADMIN — HEPARIN SODIUM 5000 UNITS: 5000 INJECTION INTRAVENOUS; SUBCUTANEOUS at 17:41

## 2023-10-11 RX ADMIN — ACETAMINOPHEN 975 MG: 325 TABLET ORAL at 06:16

## 2023-10-11 RX ADMIN — FUROSEMIDE 20 MG: 10 INJECTION, SOLUTION INTRAVENOUS at 09:17

## 2023-10-11 RX ADMIN — CLOPIDOGREL BISULFATE 75 MG: 75 TABLET ORAL at 09:17

## 2023-10-11 RX ADMIN — ONDANSETRON 4 MG: 2 INJECTION INTRAMUSCULAR; INTRAVENOUS at 09:14

## 2023-10-11 RX ADMIN — ACETAMINOPHEN 975 MG: 325 TABLET ORAL at 13:31

## 2023-10-11 RX ADMIN — GABAPENTIN 600 MG: 300 CAPSULE ORAL at 06:16

## 2023-10-11 RX ADMIN — POLYETHYLENE GLYCOL 3350 17 G: 17 POWDER, FOR SOLUTION ORAL at 21:24

## 2023-10-11 SDOH — SOCIAL STABILITY: SOCIAL INSECURITY: WITHIN THE LAST YEAR, HAVE YOU BEEN AFRAID OF YOUR PARTNER OR EX-PARTNER?: NO

## 2023-10-11 SDOH — ECONOMIC STABILITY: FOOD INSECURITY: WITHIN THE PAST 12 MONTHS, YOU WORRIED THAT YOUR FOOD WOULD RUN OUT BEFORE YOU GOT MONEY TO BUY MORE.: NEVER TRUE

## 2023-10-11 SDOH — ECONOMIC STABILITY: INCOME INSECURITY: IN THE PAST 12 MONTHS, HAS THE ELECTRIC, GAS, OIL, OR WATER COMPANY THREATENED TO SHUT OFF SERVICE IN YOUR HOME?: NO

## 2023-10-11 SDOH — SOCIAL STABILITY: SOCIAL INSECURITY: WITHIN THE LAST YEAR, HAVE YOU BEEN HUMILIATED OR EMOTIONALLY ABUSED IN OTHER WAYS BY YOUR PARTNER OR EX-PARTNER?: NO

## 2023-10-11 SDOH — ECONOMIC STABILITY: INCOME INSECURITY: HOW HARD IS IT FOR YOU TO PAY FOR THE VERY BASICS LIKE FOOD, HOUSING, MEDICAL CARE, AND HEATING?: NOT VERY HARD

## 2023-10-11 SDOH — HEALTH STABILITY: MENTAL HEALTH
STRESS IS WHEN SOMEONE FEELS TENSE, NERVOUS, ANXIOUS, OR CAN'T SLEEP AT NIGHT BECAUSE THEIR MIND IS TROUBLED. HOW STRESSED ARE YOU?: NOT AT ALL

## 2023-10-11 SDOH — ECONOMIC STABILITY: HOUSING INSECURITY
IN THE LAST 12 MONTHS, WAS THERE A TIME WHEN YOU DID NOT HAVE A STEADY PLACE TO SLEEP OR SLEPT IN A SHELTER (INCLUDING NOW)?: NO

## 2023-10-11 SDOH — SOCIAL STABILITY: SOCIAL NETWORK
IN A TYPICAL WEEK, HOW MANY TIMES DO YOU TALK ON THE PHONE WITH FAMILY, FRIENDS, OR NEIGHBORS?: MORE THAN THREE TIMES A WEEK

## 2023-10-11 SDOH — ECONOMIC STABILITY: INCOME INSECURITY: IN THE LAST 12 MONTHS, WAS THERE A TIME WHEN YOU WERE NOT ABLE TO PAY THE MORTGAGE OR RENT ON TIME?: NO

## 2023-10-11 SDOH — SOCIAL STABILITY: SOCIAL INSECURITY
WITHIN THE LAST YEAR, HAVE YOU BEEN KICKED, HIT, SLAPPED, OR OTHERWISE PHYSICALLY HURT BY YOUR PARTNER OR EX-PARTNER?: NO

## 2023-10-11 SDOH — ECONOMIC STABILITY: FOOD INSECURITY: WITHIN THE PAST 12 MONTHS, THE FOOD YOU BOUGHT JUST DIDN'T LAST AND YOU DIDN'T HAVE MONEY TO GET MORE.: NEVER TRUE

## 2023-10-11 SDOH — ECONOMIC STABILITY: TRANSPORTATION INSECURITY
IN THE PAST 12 MONTHS, HAS THE LACK OF TRANSPORTATION KEPT YOU FROM MEDICAL APPOINTMENTS OR FROM GETTING MEDICATIONS?: NO

## 2023-10-11 SDOH — SOCIAL STABILITY: SOCIAL INSECURITY
WITHIN THE LAST YEAR, HAVE TO BEEN RAPED OR FORCED TO HAVE ANY KIND OF SEXUAL ACTIVITY BY YOUR PARTNER OR EX-PARTNER?: NO

## 2023-10-11 SDOH — ECONOMIC STABILITY: TRANSPORTATION INSECURITY
IN THE PAST 12 MONTHS, HAS LACK OF TRANSPORTATION KEPT YOU FROM MEETINGS, WORK, OR FROM GETTING THINGS NEEDED FOR DAILY LIVING?: NO

## 2023-10-11 ASSESSMENT — ENCOUNTER SYMPTOMS
VOMITING: 0
AGITATION: 0
DIZZINESS: 0
APPETITE CHANGE: 0
FATIGUE: 1
LIGHT-HEADEDNESS: 0
UNEXPECTED WEIGHT CHANGE: 0
PALPITATIONS: 0
SHORTNESS OF BREATH: 0
NAUSEA: 0
NUMBNESS: 0
BRUISES/BLEEDS EASILY: 0
SORE THROAT: 0
DIAPHORESIS: 0
TROUBLE SWALLOWING: 0
EYE REDNESS: 0
FREQUENCY: 0
EYE PAIN: 0
DYSURIA: 0
ACTIVITY CHANGE: 0
COUGH: 1
CHEST TIGHTNESS: 0
DIARRHEA: 0
POLYDIPSIA: 0
JOINT SWELLING: 0
HEADACHES: 0
CONFUSION: 0
POLYPHAGIA: 0
ABDOMINAL PAIN: 0

## 2023-10-11 ASSESSMENT — ACTIVITIES OF DAILY LIVING (ADL): LACK_OF_TRANSPORTATION: NO

## 2023-10-11 ASSESSMENT — COGNITIVE AND FUNCTIONAL STATUS - GENERAL
DAILY ACTIVITIY SCORE: 21
MOVING TO AND FROM BED TO CHAIR: A LITTLE
MOBILITY SCORE: 18
WALKING IN HOSPITAL ROOM: A LITTLE
TOILETING: A LITTLE
STANDING UP FROM CHAIR USING ARMS: A LITTLE
TOILETING: A LITTLE
WALKING IN HOSPITAL ROOM: A LITTLE
MOVING FROM LYING ON BACK TO SITTING ON SIDE OF FLAT BED WITH BEDRAILS: A LITTLE
TURNING FROM BACK TO SIDE WHILE IN FLAT BAD: A LITTLE
CLIMB 3 TO 5 STEPS WITH RAILING: A LITTLE
DRESSING REGULAR UPPER BODY CLOTHING: A LITTLE
MOVING TO AND FROM BED TO CHAIR: A LITTLE
DRESSING REGULAR UPPER BODY CLOTHING: A LITTLE
HELP NEEDED FOR BATHING: A LITTLE
DAILY ACTIVITIY SCORE: 19
PERSONAL GROOMING: A LITTLE
PERSONAL GROOMING: A LITTLE
CLIMB 3 TO 5 STEPS WITH RAILING: A LITTLE
MOBILITY SCORE: 20
DRESSING REGULAR LOWER BODY CLOTHING: A LITTLE
STANDING UP FROM CHAIR USING ARMS: A LITTLE

## 2023-10-11 ASSESSMENT — PAIN - FUNCTIONAL ASSESSMENT: PAIN_FUNCTIONAL_ASSESSMENT: 0-10

## 2023-10-11 ASSESSMENT — PAIN SCALES - PAIN ASSESSMENT IN ADVANCED DEMENTIA (PAINAD): BREATHING: NORMAL

## 2023-10-11 ASSESSMENT — PAIN SCALES - GENERAL
PAINLEVEL_OUTOF10: 3
PAINLEVEL_OUTOF10: 2
PAINLEVEL_OUTOF10: 0 - NO PAIN
PAINLEVEL_OUTOF10: 2

## 2023-10-11 ASSESSMENT — PAIN SCALES - WONG BAKER: WONGBAKER_NUMERICALRESPONSE: HURTS LITTLE BIT

## 2023-10-11 NOTE — PROGRESS NOTES
" CARDIAC SURGERY DAILY PROGRESS NOTE    Tim Kenney is a 58-year-old male with a hx of IDDM (insulin pump), LENNY on CPAP, HTN, CAD, MI. He was asymptomatic until a month ago when he started having chest pain. He was seen by his cardiologist, cardiac cath demonstrated LAD  severe circumflex and severe RCA disease. He was sent to Dr. Lianne Portillo for evaluation in consideration of bypass surgery. Patient presented for CABG 10/6/2023.    10/6/2023 Procedures - Dr Behzad Portillo  1.  CABG x4, with bilateral mammary, LIMA to LAD, DINORAH as a free graft to obtuse marginal, radial to diagonal and saphenous vein graft to distal PDA.  2.  Endoscopic vein and radial harvest .  3.  The patient agreed to participate on our ongoing LeAAPs trial so he may or may not receive the left appendage clip.    CTICU course - afib - amio gtt; Endo consulted for DM management    Transferred to the LT3 10/9.  _________________________    Subjective   Denies complaints except N/V with one episode bilious emesis when returning from CXR this morning which patient attributes possibly to his \"motion sickness\"         Objective   /72 (Patient Position: Sitting)   Pulse 79   Temp 36.1 °C (97 °F) (Temporal)   Resp 16   Ht 1.88 m (6' 2\")   Wt 123 kg (272 lb 0.8 oz)   SpO2 95%   BMI 34.93 kg/m²   Pain Score: 0 - No pain  Peralta-Baker FACES Pain Rating: Hurts little bit   3 Day Weight Change: Unable to Calculate    Heart Rate:  [66-79]   Temp:  [36.1 °C (97 °F)-36.5 °C (97.7 °F)]   Resp:  [16-20]   BP: (108-143)/(64-78)   SpO2:  [93 %-99 %]      Intake and Output    Intake/Output Summary (Last 24 hours) at 10/11/2023 1102  Last data filed at 10/11/2023 1000  Gross per 24 hour   Intake 720 ml   Output 2175 ml   Net -1455 ml         Physical Exam  Physical Exam  Constitutional:       Appearance: Normal appearance.      Comments: Awake no distress, alert and cooperative. Laying in bed   HENT:      Head: Normocephalic and atraumatic.      Comments: " no apparent injury, no lesions seen     Mouth/Throat:      Mouth: Mucous membranes are moist.      Pharynx: Oropharynx is clear.   Eyes:      Conjunctiva/sclera: Conjunctivae normal.   Cardiovascular:      Comments:  NSR 80s  Regular, rate and rhythm, no murmurs, 2+ equal pulses of the extremities, normal S 1and S 2  AV pacer wires capped    Pulmonary:      Effort: Pulmonary effort is normal.      Comments: Patent airways, diminished breath sounds bilaterally on the bases with good chest expansion, thorax symmetric On RA  Sternum stable    Abdominal:      Comments: No distended, soft, non-tender,  +BS, + post op BM   Genitourinary:     Comments: Voids independently  Musculoskeletal:         General: Normal range of motion.      Cervical back: Neck supple.      Comments: HOLLAND   Skin:     General: Skin is warm and dry.      Comments: Midsternal, RAFA, well approx. w/ derma thompson, NO  s/s infection erythema, oozing or hematoma  Right  SVG Site NO  s/s infection erythema, oozing or hematoma   Radial graft well approximated, no oozing, no hematoma, no s/sx of infections     Neurological:      General: No focal deficit present.      Mental Status: He is alert and oriented to person, place, and time.   Psychiatric:         Mood and Affect: Mood normal.         Behavior: Behavior normal.         Judgment: Judgment normal.         Medications  Scheduled medications  acetaminophen, 975 mg, oral, q8h LEATHA  amiodarone, 400 mg, oral, BID  aspirin, 81 mg, oral, Daily  atorvastatin, 80 mg, oral, Daily  clopidogrel, 75 mg, oral, Daily  docusate sodium, 100 mg, oral, BID  furosemide, 20 mg, intravenous, BID after meals  gabapentin, 600 mg, oral, 4x daily  heparin (porcine), 5,000 Units, subcutaneous, q8h  Insulin, , pump - subcutaneous, Continuous Pump  iron polysaccharides, 150 mg, oral, Daily  isosorbide mononitrate ER, 30 mg, oral, Daily  metoprolol tartrate, 50 mg, oral, BID  multivitamin with minerals, 1 tablet, oral,  Daily  polyethylene glycol, 17 g, oral, TID    Continuous medications   PRN medications  PRN medications: bisacodyl, dextrose 10 % in water (D10W), dextrose, glucagon, insulin lispro, ondansetron, oxyCODONE, oxygen    Labs  Results for orders placed or performed during the hospital encounter of 10/06/23 (from the past 24 hour(s))   POCT GLUCOSE   Result Value Ref Range    POCT Glucose 288 (H) 74 - 99 mg/dL   POCT GLUCOSE   Result Value Ref Range    POCT Glucose 214 (H) 74 - 99 mg/dL   POCT GLUCOSE   Result Value Ref Range    POCT Glucose 227 (H) 74 - 99 mg/dL   POCT GLUCOSE   Result Value Ref Range    POCT Glucose 183 (H) 74 - 99 mg/dL   CBC   Result Value Ref Range    WBC 7.7 4.4 - 11.3 x10*3/uL    nRBC 0.0 0.0 - 0.0 /100 WBCs    RBC 2.71 (L) 4.50 - 5.90 x10*6/uL    Hemoglobin 8.1 (L) 13.5 - 17.5 g/dL    Hematocrit 24.8 (L) 41.0 - 52.0 %    MCV 92 80 - 100 fL    MCH 29.9 26.0 - 34.0 pg    MCHC 32.7 32.0 - 36.0 g/dL    RDW 13.7 11.5 - 14.5 %    Platelets 132 (L) 150 - 450 x10*3/uL    MPV 11.7 (H) 7.5 - 11.5 fL   Magnesium   Result Value Ref Range    Magnesium 2.17 1.60 - 2.40 mg/dL   Renal Function Panel   Result Value Ref Range    Glucose 154 (H) 74 - 99 mg/dL    Sodium 135 (L) 136 - 145 mmol/L    Potassium 4.0 3.5 - 5.3 mmol/L    Chloride 96 (L) 98 - 107 mmol/L    Bicarbonate 30 21 - 32 mmol/L    Anion Gap 13 10 - 20 mmol/L    Urea Nitrogen 18 6 - 23 mg/dL    Creatinine 0.85 0.50 - 1.30 mg/dL    eGFR >90 >60 mL/min/1.73m*2    Calcium 8.3 (L) 8.6 - 10.6 mg/dL    Phosphorus 2.8 2.5 - 4.9 mg/dL    Albumin 3.7 3.4 - 5.0 g/dL           IMPRESSION & PLAN:  POD # 5 s/p cabgx4 Vein and radial harvest   - Increase activity/ ambulation; PT/OT  - Encourage IS, C/DB; respiratory therapy; wean O2 as catia   - Cardiac rehab referral   - Continue cardiac meds: ASA, BB, statin, plavix  - Imdur for radial graft   - Pain and anticonstipation meds  - 2v CXR 10/11  - CUT epicardial wires prior to discharge   - Tele until discharge  -  Optimize nutrition and electrolytes    Rhythm: Post op AFIB while in ICU treated with Digoxin at the time,  Amiodarone bolus and infusion. Transition to PO amiodarone. Dig stopped.   - Tele: NSR 80a  - Continue Metoprolol 50 mg BID., titrate as needed   - Epicardial wires capped 10/11  - Continue Amiodarone  mg BID, consider reducing dose to 200 mg daily at discharge   - Adjust medications as tolerated    Acute Blood Loss Anemia   Hematocrit   Date Value Ref Range Status   10/11/2023 24.8 (L) 41.0 - 52.0 % Final   10/10/2023 23.8 (L) 41.0 - 52.0 % Final   10/09/2023 23.3 (L) 41.0 - 52.0 % Final   10/08/2023 25.1 (L) 41.0 - 52.0 % Final   - MV, PO Iron x1mo  - Daily labs, transfuse as indicated    Thrombocytopenia  Platelets   Date Value Ref Range Status   10/11/2023 132 (L) 150 - 450 x10*3/uL Final   10/10/2023 98 (L) 150 - 450 x10*3/uL Final   10/09/2023 86 (L) 150 - 450 x10*3/uL Final   10/08/2023 90 (L) 150 - 450 x10*3/uL Final   - Etiology likely postop/CPB related  - Continue to trend with daily CBCs    Volume/Electrolyte Status: Preop wt 120 kg    - Weight: 1123, 23 Kg   - Lasix  IV 20 mg BID   - Adjust diuresis as needed for postop cardiac surgery hypervolemia  - Replete electrolytes for hypokalemia/hypomagnesemia/hypophosphatemia as needed. K replaced on 10/10  - Daily weights and strict I&Os  - Daily RFP while admitted    Hypertension  - SBP last 24 hours 108-147  - continue BB  -Consider resuming home ACE-I as tolerated    Hyperlipidemia:   -continue statin  -follow up lipid panel with PCP/ cardio as appropriate    DM type II On Insulin Pump , Hg A1C 8.0, on 9/25/23  - Endocrinology for preop optimization DM management  - Pt may expect to restart insulin pump once the following conditions are met:  ----1. Pt has pump and all supplies for pump brought in from home- brought in by sister  ----2. Pt signed insulin pump consent form- signed 10/8, placed in pt chart  ----3. Pt is out of the ICU  -  accuchecks premeal and at bedtime  - diabetic diet 60g CHO   - lispro premeal corrective scale  - Accuchecks achs   - Hypoglycemic protocol     LENNY  -aggressive bronchial hygiene  -wean O2 as tolerated  -ABGs PRN  -continue home CPAP     Prophylaxis   DVT Prophylaxis     - SCDs/TEDS     - Ambulation/OOB     - Heparin SQ     Code Status: Full Code    Dispo  - PT/OT recs Home   - Would benefit from homecare for [facility] carepath and RN visits  - Anticipate discharge tomorrow pending further diuresis, resolved N/V  - Will continue to assess discharge needs    LISA Angulo-CNP  Cardiac Surgery AVE  [facility]  Team Pager [phone]     10/11/2023  11:02 AM

## 2023-10-11 NOTE — PROGRESS NOTES
Tim Kenney is a 58 y.o. male on day 5 of admission presenting with Coronary artery disease of native artery of native heart with stable angina pectoris (CMS/HCC).    Subjective   Patient restarted his insulin pump yesterday. In automode with cgm.   Fasting glcs overnight look good, pt with daytime hyperglycemia.   Discussed increasing basal rate as pt PO take is still minimal, 2 small meals/d  Pt pump is in automode, basal programmed at 85u TDB, currently is getting avg of 71u. Pump is a learning system and will likely increase basal in response to TDD. May adjust ISF tomorrow prior to discharge if hyperglycemia persists.     Of note, pt uses large amt of basal insulin (avg 71u/d) and very little bolus insulin (avg of 7.9u/d)- pt states he rarely eats at home due to no appetite.  I have reviewed histories, allergies and medications have been reviewed        Objective   Review of Systems   Constitutional:  Positive for fatigue. Negative for activity change, appetite change, diaphoresis and unexpected weight change.   HENT:  Negative for congestion, sore throat and trouble swallowing.    Eyes:  Negative for pain, redness and visual disturbance.   Respiratory:  Positive for cough. Negative for chest tightness and shortness of breath.    Cardiovascular:  Positive for chest pain (appropriate s/p cabg). Negative for palpitations and leg swelling.   Gastrointestinal:  Negative for abdominal pain, diarrhea, nausea and vomiting.   Endocrine: Negative for cold intolerance, heat intolerance, polydipsia, polyphagia and polyuria.   Genitourinary:  Negative for dysuria, frequency and urgency.   Musculoskeletal:  Negative for gait problem and joint swelling.   Skin:  Negative for pallor and rash.   Allergic/Immunologic: Negative for immunocompromised state.   Neurological:  Negative for dizziness, light-headedness, numbness and headaches.   Hematological:  Does not bruise/bleed easily.   Psychiatric/Behavioral:  Negative for  "agitation, behavioral problems and confusion.    All other systems reviewed and are negative.    Physical Exam  Vitals reviewed.   Constitutional:       General: He is not in acute distress.     Appearance: Normal appearance.   HENT:      Head: Normocephalic and atraumatic.      Nose: Nose normal.      Mouth/Throat:      Mouth: Mucous membranes are moist.   Eyes:      Extraocular Movements: Extraocular movements intact.      Conjunctiva/sclera: Conjunctivae normal.      Pupils: Pupils are equal, round, and reactive to light.   Cardiovascular:      Pulses: Normal pulses.      Comments: Sternotomy scar, chest tube  Pulmonary:      Effort: Pulmonary effort is normal. No respiratory distress.   Abdominal:      General: Abdomen is flat. There is no distension.   Musculoskeletal:         General: Normal range of motion.   Skin:     General: Skin is warm and dry.      Findings: No rash.   Neurological:      Mental Status: He is alert and oriented to person, place, and time.   Psychiatric:         Mood and Affect: Mood normal.         Behavior: Behavior normal.         Last Recorded Vitals  Blood pressure 122/68, pulse 69, temperature 36.4 °C (97.5 °F), temperature source Temporal, resp. rate 16, height 1.88 m (6' 2\"), weight 123 kg (272 lb 0.8 oz), SpO2 96 %.  Intake/Output last 3 Shifts:  I/O last 3 completed shifts:  In: 1255.2 (10.6 mL/kg) [P.O.:960; I.V.:295.2 (2.5 mL/kg)]  Out: 1475 (12.5 mL/kg) [Urine:1125 (0.3 mL/kg/hr); Emesis/NG output:350]  Dosing Weight: 118 kg     Relevant Results  Results from last 7 days   Lab Units 10/11/23  1153 10/11/23  0752 10/11/23  0740 10/10/23  2310 10/10/23  1708 10/10/23  1332 10/10/23  0730 10/10/23  0629 10/09/23  0445 10/09/23  0210 10/08/23  1835 10/08/23  1414 10/08/23  0419 10/08/23  0155   POCT GLUCOSE mg/dL 280*  --  183* 227* 214* 288*   < >  --    < >  --    < >  --    < >  --    GLUCOSE mg/dL  --  154*  --   --   --   --   --  119*  --  189*  --  248*  --  126*    < > = " values in this interval not displayed.         Results from last 7 days   Lab Units 10/11/23  0752   SODIUM mmol/L 135*   POTASSIUM mmol/L 4.0   CHLORIDE mmol/L 96*   CO2 mmol/L 30   BUN mg/dL 18   CREATININE mg/dL 0.85   CALCIUM mg/dL 8.3*   ALBUMIN g/dL 3.7   GLUCOSE mg/dL 154*     Lab Results   Component Value Date    HGBA1C 8.0 (A) 09/25/2023     Scheduled medications  acetaminophen, 975 mg, oral, q8h LEATHA  amiodarone, 400 mg, oral, BID  aspirin, 81 mg, oral, Daily  atorvastatin, 80 mg, oral, Daily  clopidogrel, 75 mg, oral, Daily  docusate sodium, 100 mg, oral, BID  furosemide, 20 mg, intravenous, BID after meals  gabapentin, 600 mg, oral, 4x daily  heparin (porcine), 5,000 Units, subcutaneous, q8h  Insulin, , pump - subcutaneous, Continuous Pump  iron polysaccharides, 150 mg, oral, Daily  isosorbide mononitrate ER, 30 mg, oral, Daily  metoprolol tartrate, 50 mg, oral, BID  multivitamin with minerals, 1 tablet, oral, Daily  polyethylene glycol, 17 g, oral, TID      Continuous medications       PRN medications  PRN medications: bisacodyl, dextrose 10 % in water (D10W), dextrose, glucagon, insulin lispro, ondansetron, oxyCODONE, oxygen  Tim Kenney is a 58-year-old male with a hx of IDDM, LENNY on CPAP, HTN, CAD, MI. He was asymptomatic until a month ago when he started having chest pain. He was seen by his cardiologist, cardiac cath demonstrated LAD  severe circumflex and severe RCA disease. He was sent to Dr. Lianne Portillo for evaluation in consideration of bypass surgery. Patient is s/p CABG x4 with Dr Lianne Portillo.      ASSESSMENT -T2DM s/p CABG  Diabetes History  Outpatient provider for endocrine care Dr. Rosenthal at [facility], date of last visit 8/23/23  Initial diabetes diagnosis was made 20 years ago. Pt has been on insulin pump (medtronic 770G) for past 2 years  Known complications due to diabetes include: peripheral neuropathy, CAD s/p CABG x4, obesity, and hyperglycemia     Home Management  Pt states he uses  medtronic 770g insulin pump with guardian sensor, as well as rybelsus 10mg daily. Pt has 780G software at home but has not yet upgraded his pump.      10/7- Pt states he changes his site every 3 days, and fills his pump with approx 300u of insulin.  CareEverywhere reviewed, no insulin pump settings listed in notes from [facility]  Pt does not know his insulin pump settings, does not have his pump with him, and states he does not remember how much insulin he took when he was on injections.     Pt with high insulin requirements on gtt s/p CABG d/t post-operative stress state and pain causing hyperglycemia. Pt was also on pressors and multiple medications mixed with dextrose, insulin gtt able to titrate down quickly once those meds were discontinued. Will transition pt to subcutaneous insulin based on insulin gtt requirments as well as weight based insulin dosing. Pt agreeable to plan      10/8- Pump Settings  Basal Rates: 3.5u/h - TDB 85u (71u is 30 day average in automode)  Insulin to Carb Ratio: 1u:3g carbs   Insulin Sensitivity Factor: 1u: 6 glc pts  Targets: 100-120  Duration of Insulin Action: 3h  Of note, pt uses large amt of basal insulin (avg 71u/d) and very little bolus insulin (avg of 7.9u/d)- pt states he rarely eats at home due to no appetite.     PLAN-  - Goal BG <180  -Stop all injected insulins, patient will provide all of his own insulin dosing via his insulin pump  Patient will need to change his insulin pump infusion set on October 13, patient will likely be home at the time that this changes he is due.  Should patient have need to change infusion set while admitted, but nursing only to hand patient vial of Humalog via as needed order      -diabetes diet 60g CHO per meal  - POCT glucose- please check fingerstick glucose three times daily prior to meals and at bedtime, or Q6H if pt is NPO. pt may continue to wear CGM from home, but fingerstick glucose must continue to be collected as standard of  care. intervention should be based on fingerstick reading.  -Hypoglycemia protocol  -Will continue to follow and titrate insulin accordingly     Pt signed insulin pump consent form- signed 10/8, placed in pt chart       I spent 25 minutes in the professional and overall care of this patient.      Wendy Mcguier PA-C

## 2023-10-11 NOTE — PROGRESS NOTES
Tim Kenney is a 58 y.o. male on day 5 of admission presenting with Coronary artery disease of native artery of native heart with stable angina pectoris (CMS/[facility]).  Met with patient to introduce myself, role and discuss discharge planning.  Patient lives with his sister.  Independent in all adl's.  Requires no assist devices for ambulation.  Patient denies active home care, but is agreeable to home care at discharge.        Erasmo James RN

## 2023-10-12 VITALS
SYSTOLIC BLOOD PRESSURE: 131 MMHG | HEART RATE: 75 BPM | RESPIRATION RATE: 18 BRPM | DIASTOLIC BLOOD PRESSURE: 73 MMHG | TEMPERATURE: 97.5 F | HEIGHT: 74 IN | OXYGEN SATURATION: 95 % | WEIGHT: 272.05 LBS | BODY MASS INDEX: 34.91 KG/M2

## 2023-10-12 LAB
ALBUMIN SERPL BCP-MCNC: 3.7 G/DL (ref 3.4–5)
ANION GAP SERPL CALC-SCNC: 14 MMOL/L (ref 10–20)
BUN SERPL-MCNC: 17 MG/DL (ref 6–23)
CALCIUM SERPL-MCNC: 8.4 MG/DL (ref 8.6–10.6)
CHLORIDE SERPL-SCNC: 97 MMOL/L (ref 98–107)
CO2 SERPL-SCNC: 30 MMOL/L (ref 21–32)
CREAT SERPL-MCNC: 0.93 MG/DL (ref 0.5–1.3)
ERYTHROCYTE [DISTWIDTH] IN BLOOD BY AUTOMATED COUNT: 13.9 % (ref 11.5–14.5)
GFR SERPL CREATININE-BSD FRML MDRD: >90 ML/MIN/1.73M*2
GLUCOSE BLD MANUAL STRIP-MCNC: 197 MG/DL (ref 74–99)
GLUCOSE BLD MANUAL STRIP-MCNC: 250 MG/DL (ref 74–99)
GLUCOSE BLD MANUAL STRIP-MCNC: 285 MG/DL (ref 74–99)
GLUCOSE SERPL-MCNC: 168 MG/DL (ref 74–99)
HCT VFR BLD AUTO: 26.1 % (ref 41–52)
HGB BLD-MCNC: 8.5 G/DL (ref 13.5–17.5)
MAGNESIUM SERPL-MCNC: 2.12 MG/DL (ref 1.6–2.4)
MCH RBC QN AUTO: 29.9 PG (ref 26–34)
MCHC RBC AUTO-ENTMCNC: 32.6 G/DL (ref 32–36)
MCV RBC AUTO: 92 FL (ref 80–100)
NRBC BLD-RTO: 0.2 /100 WBCS (ref 0–0)
PHOSPHATE SERPL-MCNC: 3.1 MG/DL (ref 2.5–4.9)
PLATELET # BLD AUTO: 169 X10*3/UL (ref 150–450)
PMV BLD AUTO: 11.4 FL (ref 7.5–11.5)
POTASSIUM SERPL-SCNC: 4.1 MMOL/L (ref 3.5–5.3)
RBC # BLD AUTO: 2.84 X10*6/UL (ref 4.5–5.9)
SODIUM SERPL-SCNC: 137 MMOL/L (ref 136–145)
WBC # BLD AUTO: 8.9 X10*3/UL (ref 4.4–11.3)

## 2023-10-12 PROCEDURE — 99233 SBSQ HOSP IP/OBS HIGH 50: CPT

## 2023-10-12 PROCEDURE — 36415 COLL VENOUS BLD VENIPUNCTURE: CPT | Performed by: STUDENT IN AN ORGANIZED HEALTH CARE EDUCATION/TRAINING PROGRAM

## 2023-10-12 PROCEDURE — 2500000001 HC RX 250 WO HCPCS SELF ADMINISTERED DRUGS (ALT 637 FOR MEDICARE OP)

## 2023-10-12 PROCEDURE — 2500000002 HC RX 250 W HCPCS SELF ADMINISTERED DRUGS (ALT 637 FOR MEDICARE OP, ALT 636 FOR OP/ED): Performed by: NURSE PRACTITIONER

## 2023-10-12 PROCEDURE — 85027 COMPLETE CBC AUTOMATED: CPT | Performed by: STUDENT IN AN ORGANIZED HEALTH CARE EDUCATION/TRAINING PROGRAM

## 2023-10-12 PROCEDURE — 96372 THER/PROPH/DIAG INJ SC/IM: CPT

## 2023-10-12 PROCEDURE — 83735 ASSAY OF MAGNESIUM: CPT | Performed by: STUDENT IN AN ORGANIZED HEALTH CARE EDUCATION/TRAINING PROGRAM

## 2023-10-12 PROCEDURE — 80069 RENAL FUNCTION PANEL: CPT | Performed by: STUDENT IN AN ORGANIZED HEALTH CARE EDUCATION/TRAINING PROGRAM

## 2023-10-12 PROCEDURE — 2500000004 HC RX 250 GENERAL PHARMACY W/ HCPCS (ALT 636 FOR OP/ED)

## 2023-10-12 PROCEDURE — 94660 CPAP INITIATION&MGMT: CPT

## 2023-10-12 PROCEDURE — 97535 SELF CARE MNGMENT TRAINING: CPT | Mod: GO

## 2023-10-12 PROCEDURE — 82947 ASSAY GLUCOSE BLOOD QUANT: CPT

## 2023-10-12 PROCEDURE — 99232 SBSQ HOSP IP/OBS MODERATE 35: CPT | Performed by: NURSE PRACTITIONER

## 2023-10-12 PROCEDURE — 2500000004 HC RX 250 GENERAL PHARMACY W/ HCPCS (ALT 636 FOR OP/ED): Performed by: NURSE PRACTITIONER

## 2023-10-12 PROCEDURE — 2500000001 HC RX 250 WO HCPCS SELF ADMINISTERED DRUGS (ALT 637 FOR MEDICARE OP): Performed by: NURSE PRACTITIONER

## 2023-10-12 PROCEDURE — 97165 OT EVAL LOW COMPLEX 30 MIN: CPT | Mod: GO

## 2023-10-12 RX ORDER — POLYETHYLENE GLYCOL 3350 17 G/17G
17 POWDER, FOR SOLUTION ORAL DAILY PRN
Refills: 0 | COMMUNITY
Start: 2023-10-12

## 2023-10-12 RX ORDER — CLOPIDOGREL BISULFATE 75 MG/1
75 TABLET ORAL DAILY
Qty: 30 TABLET | Refills: 0 | Status: SHIPPED | OUTPATIENT
Start: 2023-10-13 | End: 2023-11-10 | Stop reason: SDUPTHER

## 2023-10-12 RX ORDER — METOPROLOL TARTRATE 50 MG/1
50 TABLET ORAL 2 TIMES DAILY
Qty: 60 TABLET | Refills: 0 | Status: SHIPPED | OUTPATIENT
Start: 2023-10-12 | End: 2023-11-10 | Stop reason: SDUPTHER

## 2023-10-12 RX ORDER — DOCUSATE SODIUM 100 MG/1
100 CAPSULE, LIQUID FILLED ORAL 2 TIMES DAILY
Refills: 0 | COMMUNITY
Start: 2023-10-12 | End: 2023-11-29 | Stop reason: ALTCHOICE

## 2023-10-12 RX ORDER — AMIODARONE HYDROCHLORIDE 200 MG/1
200 TABLET ORAL DAILY
Qty: 30 TABLET | Refills: 0 | Status: SHIPPED | OUTPATIENT
Start: 2023-10-12 | End: 2023-11-10 | Stop reason: SDUPTHER

## 2023-10-12 RX ORDER — ATORVASTATIN CALCIUM 80 MG/1
80 TABLET, FILM COATED ORAL DAILY
Qty: 30 TABLET | Refills: 0 | Status: SHIPPED | OUTPATIENT
Start: 2023-10-12 | End: 2023-11-13 | Stop reason: SDUPTHER

## 2023-10-12 RX ORDER — ASPIRIN 81 MG/1
81 TABLET ORAL DAILY
Refills: 0 | Status: CANCELLED | COMMUNITY
Start: 2023-10-12

## 2023-10-12 RX ORDER — FUROSEMIDE 20 MG/1
20 TABLET ORAL DAILY
Qty: 7 TABLET | Refills: 0 | Status: SHIPPED | OUTPATIENT
Start: 2023-10-12 | End: 2023-11-29 | Stop reason: ALTCHOICE

## 2023-10-12 RX ORDER — GABAPENTIN 600 MG/1
600 TABLET ORAL
Refills: 0 | COMMUNITY
Start: 2023-10-12 | End: 2023-11-09 | Stop reason: SDUPTHER

## 2023-10-12 RX ORDER — ISOSORBIDE MONONITRATE 30 MG/1
30 TABLET, EXTENDED RELEASE ORAL DAILY
COMMUNITY
Start: 2023-10-12 | End: 2023-11-10 | Stop reason: SDUPTHER

## 2023-10-12 RX ORDER — MULTIVIT-MIN/IRON FUM/FOLIC AC 7.5 MG-4
1 TABLET ORAL DAILY
Refills: 0 | COMMUNITY
Start: 2023-10-12

## 2023-10-12 RX ORDER — ACETAMINOPHEN 325 MG/1
650 TABLET ORAL EVERY 6 HOURS PRN
Refills: 0 | COMMUNITY
Start: 2023-10-12

## 2023-10-12 RX ORDER — LISINOPRIL 2.5 MG/1
2.5 TABLET ORAL DAILY
Status: DISCONTINUED | OUTPATIENT
Start: 2023-10-13 | End: 2023-10-12 | Stop reason: HOSPADM

## 2023-10-12 RX ORDER — IRON POLYSACCHARIDE COMPLEX 150 MG
150 CAPSULE ORAL DAILY
Qty: 30 CAPSULE | Refills: 0 | Status: SHIPPED | OUTPATIENT
Start: 2023-10-13 | End: 2023-11-10 | Stop reason: SDUPTHER

## 2023-10-12 RX ADMIN — FUROSEMIDE 20 MG: 10 INJECTION, SOLUTION INTRAVENOUS at 10:09

## 2023-10-12 RX ADMIN — GABAPENTIN 600 MG: 300 CAPSULE ORAL at 06:20

## 2023-10-12 RX ADMIN — HEPARIN SODIUM 5000 UNITS: 5000 INJECTION INTRAVENOUS; SUBCUTANEOUS at 08:26

## 2023-10-12 RX ADMIN — HEPARIN SODIUM 5000 UNITS: 5000 INJECTION INTRAVENOUS; SUBCUTANEOUS at 16:25

## 2023-10-12 RX ADMIN — ASPIRIN 81 MG: 81 TABLET, COATED ORAL at 08:25

## 2023-10-12 RX ADMIN — POLYETHYLENE GLYCOL 3350 17 G: 17 POWDER, FOR SOLUTION ORAL at 08:25

## 2023-10-12 RX ADMIN — ATORVASTATIN CALCIUM 80 MG: 80 TABLET, FILM COATED ORAL at 08:27

## 2023-10-12 RX ADMIN — ISOSORBIDE MONONITRATE 30 MG: 30 TABLET, EXTENDED RELEASE ORAL at 08:25

## 2023-10-12 RX ADMIN — GABAPENTIN 600 MG: 300 CAPSULE ORAL at 16:25

## 2023-10-12 RX ADMIN — BISACODYL 10 MG: 10 SUPPOSITORY RECTAL at 10:15

## 2023-10-12 RX ADMIN — POLYETHYLENE GLYCOL 3350 17 G: 17 POWDER, FOR SOLUTION ORAL at 16:27

## 2023-10-12 RX ADMIN — Medication 1 TABLET: at 08:26

## 2023-10-12 RX ADMIN — Medication 150 MG: at 08:26

## 2023-10-12 RX ADMIN — ACETAMINOPHEN 975 MG: 325 TABLET ORAL at 16:25

## 2023-10-12 RX ADMIN — AMIODARONE HYDROCHLORIDE 400 MG: 200 TABLET ORAL at 08:27

## 2023-10-12 RX ADMIN — CLOPIDOGREL BISULFATE 75 MG: 75 TABLET ORAL at 08:27

## 2023-10-12 RX ADMIN — METOPROLOL TARTRATE 50 MG: 50 TABLET, FILM COATED ORAL at 08:25

## 2023-10-12 RX ADMIN — DOCUSATE SODIUM 100 MG: 100 CAPSULE, LIQUID FILLED ORAL at 08:26

## 2023-10-12 RX ADMIN — ACETAMINOPHEN 975 MG: 325 TABLET ORAL at 06:20

## 2023-10-12 ASSESSMENT — COGNITIVE AND FUNCTIONAL STATUS - GENERAL
TOILETING: A LITTLE
CLIMB 3 TO 5 STEPS WITH RAILING: A LITTLE
DAILY ACTIVITIY SCORE: 21
DRESSING REGULAR LOWER BODY CLOTHING: A LITTLE
MOBILITY SCORE: 22
TOILETING: A LITTLE
HELP NEEDED FOR BATHING: A LITTLE
DRESSING REGULAR UPPER BODY CLOTHING: A LITTLE
PERSONAL GROOMING: A LITTLE
DAILY ACTIVITIY SCORE: 20
WALKING IN HOSPITAL ROOM: A LITTLE
PERSONAL GROOMING: A LITTLE

## 2023-10-12 ASSESSMENT — ENCOUNTER SYMPTOMS
CONFUSION: 0
LIGHT-HEADEDNESS: 0
NUMBNESS: 0
JOINT SWELLING: 0
POLYPHAGIA: 0
ABDOMINAL PAIN: 0
SORE THROAT: 0
PALPITATIONS: 0
APPETITE CHANGE: 0
DIARRHEA: 0
EYE REDNESS: 0
POLYDIPSIA: 0
NAUSEA: 0
HEADACHES: 0
DIAPHORESIS: 0
VOMITING: 0
SHORTNESS OF BREATH: 0
BRUISES/BLEEDS EASILY: 0
COUGH: 1
CHEST TIGHTNESS: 0
AGITATION: 0
UNEXPECTED WEIGHT CHANGE: 0
DYSURIA: 0
EYE PAIN: 0
DIZZINESS: 0
FREQUENCY: 0
ACTIVITY CHANGE: 0
FATIGUE: 1
TROUBLE SWALLOWING: 0

## 2023-10-12 ASSESSMENT — PAIN SCALES - GENERAL
PAINLEVEL_OUTOF10: 2
PAINLEVEL_OUTOF10: 0 - NO PAIN
PAINLEVEL_OUTOF10: 0 - NO PAIN

## 2023-10-12 ASSESSMENT — ACTIVITIES OF DAILY LIVING (ADL)
BATHING_ASSISTANCE: STAND BY
HOME_MANAGEMENT_TIME_ENTRY: 10

## 2023-10-12 ASSESSMENT — PAIN SCALES - WONG BAKER: WONGBAKER_NUMERICALRESPONSE: NO HURT

## 2023-10-12 ASSESSMENT — PAIN - FUNCTIONAL ASSESSMENT: PAIN_FUNCTIONAL_ASSESSMENT: 0-10

## 2023-10-12 NOTE — PROGRESS NOTES
Tim Kenney is a 58 y.o. male on day 6 of admission presenting with Coronary artery disease of native artery of native heart with stable angina pectoris (CMS/[facility]).    Subjective   Patient pending discharge today if he has BM  Pt used 120u of basal insulin yesterday in auto mode  Changed AIT from 3h to 2h to allow for for more frequent corrections of hyperglycemia   Pt will need to change site this afternoon, insulin vial to be provided by pharmacy, pts sister to bring in pump supplies from home    Of note, pt uses large amt of basal insulin (avg 71u/d) and very little bolus insulin (avg of 7.9u/d)- pt states he rarely eats at home due to no appetite.  I have reviewed histories, allergies and medications have been reviewed        Objective   Review of Systems   Constitutional:  Positive for fatigue. Negative for activity change, appetite change, diaphoresis and unexpected weight change.   HENT:  Negative for congestion, sore throat and trouble swallowing.    Eyes:  Negative for pain, redness and visual disturbance.   Respiratory:  Positive for cough. Negative for chest tightness and shortness of breath.    Cardiovascular:  Positive for chest pain (appropriate s/p cabg). Negative for palpitations and leg swelling.   Gastrointestinal:  Negative for abdominal pain, diarrhea, nausea and vomiting.   Endocrine: Negative for cold intolerance, heat intolerance, polydipsia, polyphagia and polyuria.   Genitourinary:  Negative for dysuria, frequency and urgency.   Musculoskeletal:  Negative for gait problem and joint swelling.   Skin:  Negative for pallor and rash.   Allergic/Immunologic: Negative for immunocompromised state.   Neurological:  Negative for dizziness, light-headedness, numbness and headaches.   Hematological:  Does not bruise/bleed easily.   Psychiatric/Behavioral:  Negative for agitation, behavioral problems and confusion.    All other systems reviewed and are negative.    Physical Exam  Vitals reviewed.  "  Constitutional:       General: He is not in acute distress.     Appearance: Normal appearance.   HENT:      Head: Normocephalic and atraumatic.      Nose: Nose normal.      Mouth/Throat:      Mouth: Mucous membranes are moist.   Eyes:      Extraocular Movements: Extraocular movements intact.      Conjunctiva/sclera: Conjunctivae normal.      Pupils: Pupils are equal, round, and reactive to light.   Cardiovascular:      Pulses: Normal pulses.      Comments: Sternotomy scar, chest tube  Pulmonary:      Effort: Pulmonary effort is normal. No respiratory distress.   Abdominal:      General: Abdomen is flat. There is no distension.   Musculoskeletal:         General: Normal range of motion.   Skin:     General: Skin is warm and dry.      Findings: No rash.   Neurological:      Mental Status: He is alert and oriented to person, place, and time.   Psychiatric:         Mood and Affect: Mood normal.         Behavior: Behavior normal.         Last Recorded Vitals  Blood pressure 126/68, pulse 71, temperature 35.9 °C (96.7 °F), resp. rate 20, height 1.88 m (6' 2\"), weight 123 kg (272 lb 0.8 oz), SpO2 95 %.  Intake/Output last 3 Shifts:  I/O last 3 completed shifts:  In: 840 (7.1 mL/kg) [P.O.:840]  Out: 2175 (18.4 mL/kg) [Urine:1975 (0.5 mL/kg/hr); Emesis/NG output:200]  Dosing Weight: 118 kg     Relevant Results  Results from last 7 days   Lab Units 10/12/23  1232 10/12/23  0811 10/12/23  0751 10/11/23  2152 10/11/23  1350 10/11/23  1153 10/11/23  0752 10/10/23  0730 10/10/23  0629 10/09/23  0445 10/09/23  0210 10/08/23  1835 10/08/23  1414   POCT GLUCOSE mg/dL 285*  --  197* 219* 268* 280*  --    < >  --    < >  --    < >  --    GLUCOSE mg/dL  --  168*  --   --   --   --  154*  --  119*  --  189*  --  248*    < > = values in this interval not displayed.         Results from last 7 days   Lab Units 10/12/23  0811   SODIUM mmol/L 137   POTASSIUM mmol/L 4.1   CHLORIDE mmol/L 97*   CO2 mmol/L 30   BUN mg/dL 17   CREATININE " mg/dL 0.93   CALCIUM mg/dL 8.4*   ALBUMIN g/dL 3.7   GLUCOSE mg/dL 168*     Lab Results   Component Value Date    HGBA1C 8.0 (A) 09/25/2023     Scheduled medications  acetaminophen, 975 mg, oral, q8h LEATHA  amiodarone, 400 mg, oral, BID  aspirin, 81 mg, oral, Daily  atorvastatin, 80 mg, oral, Daily  bisacodyl, 10 mg, rectal, Daily  clopidogrel, 75 mg, oral, Daily  docusate sodium, 100 mg, oral, BID  furosemide, 20 mg, intravenous, BID after meals  gabapentin, 600 mg, oral, 4x daily  heparin (porcine), 5,000 Units, subcutaneous, q8h  Insulin, , pump - subcutaneous, Continuous Pump  iron polysaccharides, 150 mg, oral, Daily  isosorbide mononitrate ER, 30 mg, oral, Daily  metoprolol tartrate, 50 mg, oral, BID  multivitamin with minerals, 1 tablet, oral, Daily  polyethylene glycol, 17 g, oral, TID      Continuous medications       PRN medications  PRN medications: dextrose 10 % in water (D10W), dextrose, glucagon, insulin lispro, ondansetron, oxyCODONE, oxygen  Tim Kenney is a 58-year-old male with a hx of IDDM, LENNY on CPAP, HTN, CAD, MI. He was asymptomatic until a month ago when he started having chest pain. He was seen by his cardiologist, cardiac cath demonstrated LAD  severe circumflex and severe RCA disease. He was sent to Dr. Lianne Portillo for evaluation in consideration of bypass surgery. Patient is s/p CABG x4 with Dr Lianne Portillo.      ASSESSMENT -T2DM s/p CABG  Diabetes History  Outpatient provider for endocrine care Dr. Rosenthal at [facility], date of last visit 8/23/23  Initial diabetes diagnosis was made 20 years ago. Pt has been on insulin pump (medtronic 770G) for past 2 years  Known complications due to diabetes include: peripheral neuropathy, CAD s/p CABG x4, obesity, and hyperglycemia     Home Management  Pt states he uses medtronic 770g insulin pump with guardian sensor, as well as rybelsus 10mg daily. Pt has 780G software at home but has not yet upgraded his pump.      10/7- Pt states he changes his site  every 3 days, and fills his pump with approx 300u of insulin.  CareEverywhere reviewed, no insulin pump settings listed in notes from [facility]  Pt does not know his insulin pump settings, does not have his pump with him, and states he does not remember how much insulin he took when he was on injections.     Pt with high insulin requirements on gtt s/p CABG d/t post-operative stress state and pain causing hyperglycemia. Pt was also on pressors and multiple medications mixed with dextrose, insulin gtt able to titrate down quickly once those meds were discontinued. Will transition pt to subcutaneous insulin based on insulin gtt requirments as well as weight based insulin dosing. Pt agreeable to plan      10/8- Pump Settings  Basal Rates: 3.5u/h - TDB 85u (71u is 30 day average in automode)  Insulin to Carb Ratio: 1u:3g carbs   Insulin Sensitivity Factor: 1u: 6 glc pts  Targets: 100-120  Duration of Insulin Action: 3h --> CHANGED TO 2H  Of note, pt uses large amt of basal insulin (avg 71u/d) and very little bolus insulin (avg of 7.9u/d)- pt states he rarely eats at home due to no appetite.     PLAN-  - Goal BG <180  -Stop all injected insulins, patient will provide all of his own insulin dosing via his insulin pump.  Pt is running out of insulin in pump due to large requirements, will need to change out insulin resevoir and site today, this afternoon/evening. Pts sister will bring in supplies to change pump. Pharmacy will provide insulin vial.  nursing only to hand patient vial of Humalog via as needed order    In the event that patient requires subcutaneous insulin (pump runs out of insulin or malfunctions, or pt AMS barrier to managing pump):  -start glargine 60u once daily  -start lispro corrective scale #4 q4h  -start lispro 10u with meals if pt eats meal      -diabetes diet 60g CHO per meal  - POCT glucose- please check fingerstick glucose three times daily prior to meals and at bedtime, or Q6H if pt is NPO. pt  may continue to wear CGM from home, but fingerstick glucose must continue to be collected as standard of care. intervention should be based on fingerstick reading.  -Hypoglycemia protocol  -Will continue to follow and titrate insulin accordingly     Pt signed insulin pump consent form- signed 10/8, placed in pt chart       I spent 45 minutes in the professional and overall care of this patient.      Wendy Mcguire PA-C

## 2023-10-12 NOTE — PROGRESS NOTES
" CARDIAC SURGERY DAILY PROGRESS NOTE    Tim Kenney is a 58-year-old male with a hx of IDDM (insulin pump), LENNY on CPAP, HTN, CAD, MI. He was asymptomatic until a month ago when he started having chest pain. He was seen by his cardiologist, cardiac cath demonstrated LAD  severe circumflex and severe RCA disease. He was sent to Dr. Lianne Portillo for evaluation in consideration of bypass surgery. Patient presented for CABG 10/6/2023.    10/6/2023 Procedures - Dr Behzad Portillo  1.  CABG x4, with bilateral mammary, LIMA to LAD, DINORAH as a free graft to obtuse marginal, radial to diagonal and saphenous vein graft to distal PDA.  2.  Endoscopic vein and radial harvest .  3.  The patient agreed to participate on our ongoing LeAAPs trial so he may or may not receive the left appendage clip.    CTICU course - afib - amio gtt; Endo consulted for DM management    Transferred to the LT3 10/9.  _________________________    Subjective   No N/V this am  KUB yesterday with significant stool burden; no BM since  Glucose > 250 today         Objective   /73   Pulse 75   Temp 36.4 °C (97.5 °F)   Resp 18   Ht 1.88 m (6' 2\")   Wt 123 kg (272 lb 0.8 oz)   SpO2 95%   BMI 34.93 kg/m²   Pain Score: 0 - No pain  Peralta-Baker FACES Pain Rating: No hurt   3 Day Weight Change: Unable to Calculate    Heart Rate:  [62-79]   Temp:  [35.9 °C (96.7 °F)-37 °C (98.6 °F)]   Resp:  [16-20]   BP: (116-138)/(68-82)   SpO2:  [94 %-96 %]      Intake and Output    Intake/Output Summary (Last 24 hours) at 10/12/2023 1702  Last data filed at 10/12/2023 1600  Gross per 24 hour   Intake 480 ml   Output 1800 ml   Net -1320 ml         Physical Exam  Physical Exam  Constitutional:       Appearance: Normal appearance.      Comments: Awake no distress, alert and cooperative. Laying in bed   HENT:      Head: Normocephalic and atraumatic.      Comments: no apparent injury, no lesions seen     Mouth/Throat:      Mouth: Mucous membranes are moist.      Pharynx: " Oropharynx is clear.   Eyes:      Conjunctiva/sclera: Conjunctivae normal.   Cardiovascular:      Comments:  NSR 80s  Regular, rate and rhythm, no murmurs, 2+ equal pulses of the extremities, normal S 1and S 2  AV pacer wires capped    Pulmonary:      Effort: Pulmonary effort is normal.      Comments: Patent airways, diminished breath sounds bilaterally on the bases with good chest expansion, thorax symmetric On RA  Sternum stable    Abdominal:      Comments: No distended, soft, non-tender,  +BS, + post op BM   Genitourinary:     Comments: Voids independently  Musculoskeletal:         General: Normal range of motion.      Cervical back: Neck supple.      Comments: HOLLAND   Skin:     General: Skin is warm and dry.      Comments: Midsternal, RAFA, well approx. w/ derma thompson, NO  s/s infection erythema, oozing or hematoma  Right  SVG Site NO  s/s infection erythema, oozing or hematoma   Radial graft well approximated, no oozing, no hematoma, no s/sx of infections     Neurological:      General: No focal deficit present.      Mental Status: He is alert and oriented to person, place, and time.   Psychiatric:         Mood and Affect: Mood normal.         Behavior: Behavior normal.         Judgment: Judgment normal.         Medications  Scheduled medications  acetaminophen, 975 mg, oral, q8h LEATHA  amiodarone, 400 mg, oral, BID  aspirin, 81 mg, oral, Daily  atorvastatin, 80 mg, oral, Daily  bisacodyl, 10 mg, rectal, Daily  clopidogrel, 75 mg, oral, Daily  docusate sodium, 100 mg, oral, BID  furosemide, 20 mg, intravenous, BID after meals  gabapentin, 600 mg, oral, 4x daily  heparin (porcine), 5,000 Units, subcutaneous, q8h  Insulin, , pump - subcutaneous, Continuous Pump  iron polysaccharides, 150 mg, oral, Daily  isosorbide mononitrate ER, 30 mg, oral, Daily  metoprolol tartrate, 50 mg, oral, BID  multivitamin with minerals, 1 tablet, oral, Daily  polyethylene glycol, 17 g, oral, TID    Continuous medications   PRN  medications  PRN medications: dextrose 10 % in water (D10W), dextrose, glucagon, insulin lispro, ondansetron, oxyCODONE, oxygen    Labs  Results for orders placed or performed during the hospital encounter of 10/06/23 (from the past 24 hour(s))   POCT GLUCOSE   Result Value Ref Range    POCT Glucose 219 (H) 74 - 99 mg/dL   POCT GLUCOSE   Result Value Ref Range    POCT Glucose 197 (H) 74 - 99 mg/dL   CBC   Result Value Ref Range    WBC 8.9 4.4 - 11.3 x10*3/uL    nRBC 0.2 (H) 0.0 - 0.0 /100 WBCs    RBC 2.84 (L) 4.50 - 5.90 x10*6/uL    Hemoglobin 8.5 (L) 13.5 - 17.5 g/dL    Hematocrit 26.1 (L) 41.0 - 52.0 %    MCV 92 80 - 100 fL    MCH 29.9 26.0 - 34.0 pg    MCHC 32.6 32.0 - 36.0 g/dL    RDW 13.9 11.5 - 14.5 %    Platelets 169 150 - 450 x10*3/uL    MPV 11.4 7.5 - 11.5 fL   Magnesium   Result Value Ref Range    Magnesium 2.12 1.60 - 2.40 mg/dL   Renal Function Panel   Result Value Ref Range    Glucose 168 (H) 74 - 99 mg/dL    Sodium 137 136 - 145 mmol/L    Potassium 4.1 3.5 - 5.3 mmol/L    Chloride 97 (L) 98 - 107 mmol/L    Bicarbonate 30 21 - 32 mmol/L    Anion Gap 14 10 - 20 mmol/L    Urea Nitrogen 17 6 - 23 mg/dL    Creatinine 0.93 0.50 - 1.30 mg/dL    eGFR >90 >60 mL/min/1.73m*2    Calcium 8.4 (L) 8.6 - 10.6 mg/dL    Phosphorus 3.1 2.5 - 4.9 mg/dL    Albumin 3.7 3.4 - 5.0 g/dL   POCT GLUCOSE   Result Value Ref Range    POCT Glucose 285 (H) 74 - 99 mg/dL   POCT GLUCOSE   Result Value Ref Range    POCT Glucose 250 (H) 74 - 99 mg/dL           IMPRESSION & PLAN:  POD # 6 s/p cabgx4 Vein and radial harvest   - Increase activity/ ambulation; PT/OT  - Encourage IS, C/DB; respiratory therapy; wean O2 as catia   - Cardiac rehab referral   - Continue cardiac meds: ASA, BB, statin, plavix  - Imdur for radial graft   - Pain and anticonstipation meds  - 2v CXR 10/11  - CUT epicardial wires prior to discharge   - Tele until discharge  - Optimize nutrition and electrolytes    Rhythm: Post op AFIB while in ICU treated with Digoxin at  the time,  Amiodarone bolus and infusion. Transition to PO amiodarone. Dig stopped.   - Tele: NSR 80a  - Continue Metoprolol 50 mg BID., titrate as needed   - Epicardial wires capped 10/11  - Continue Amiodarone  mg BID, consider reducing dose to 200 mg daily at discharge   - Adjust medications as tolerated    Acute Blood Loss Anemia   Hematocrit   Date Value Ref Range Status   10/12/2023 26.1 (L) 41.0 - 52.0 % Final   10/11/2023 24.8 (L) 41.0 - 52.0 % Final   10/10/2023 23.8 (L) 41.0 - 52.0 % Final   - MV, PO Iron x1mo  - Daily labs, transfuse as indicated    Thrombocytopenia  Platelets   Date Value Ref Range Status   10/12/2023 169 150 - 450 x10*3/uL Final   10/11/2023 132 (L) 150 - 450 x10*3/uL Final   10/10/2023 98 (L) 150 - 450 x10*3/uL Final   - Etiology likely postop/CPB related  - Continue to trend with daily CBCs    Volume/Electrolyte Status: Preop wt 120 kg    - Weight: 1123, 23 Kg   - Lasix  IV 20 mg BID   - Adjust diuresis as needed for postop cardiac surgery hypervolemia  - Replete electrolytes for hypokalemia/hypomagnesemia/hypophosphatemia as needed. K replaced on 10/10  - Daily weights and strict I&Os  - Daily RFP while admitted    Hypertension  - SBP last 24 hours 122-143  - continue BB  - Consider resuming home ACE-I as tolerated    Hyperlipidemia:   -continue statin  -follow up lipid panel with PCP/ cardio as appropriate    DM type II On Insulin Pump , Hg A1C 8.0, on 9/25/23  - Endocrinology for preop optimization DM management  - Pt may expect to restart insulin pump once the following conditions are met:  ----1. Pt has pump and all supplies for pump brought in from home- brought in by sister  ----2. Pt signed insulin pump consent form- signed 10/8, placed in pt chart  ----3. Pt is out of the ICU  - accuchecks premeal and at bedtime  - diabetic diet 60g CHO   - lispro premeal corrective scale  - Accuchecks achs   - Hypoglycemic protocol   - 10/12 per surgeon Dr. Smart needs improved  glucose control prior to discharge (has been >250 today)    LENNY  -aggressive bronchial hygiene  -wean O2 as tolerated  -ABGs PRN  -continue home CPAP     Prophylaxis   DVT Prophylaxis     - SCDs/TEDS     - Ambulation/OOB     - Heparin SQ     Code Status: Full Code    Dispo  - PT/OT recs Home   - Would benefit from homecare for [facility] carepath and RN visits  - Anticipate discharge tomorrow pending further diuresis, BM, improved glucose control  - Will continue to assess discharge needs    LISA Angulo-CNP  Cardiac Surgery AVE  [facility]  Team Pager [phone]     10/12/2023  5:02 PM

## 2023-10-12 NOTE — DISCHARGE SUMMARY
Discharge Diagnosis  Coronary artery disease of native artery of native heart with stable angina pectoris (CMS/HCC)    Issues Requiring Follow-Up  Needs to follow up with endocrinology as blood sugars not optimally controlled       Test Results Pending At Discharge  Pending Labs       No current pending labs.            Hospital Course  Tim Kenney is a 58-year-old male with a hx of IDDM (insulin pump), LENNY on CPAP, HTN, CAD, MI. He was asymptomatic until a month ago when he started having chest pain. He was seen by his cardiologist, cardiac cath demonstrated LAD  severe circumflex and severe RCA disease. He was sent to Dr. Lianne Portillo for evaluation in consideration of bypass surgery. Patient presented for CABG 10/6/2023.    10/6/2023 Procedures - Dr Behzad Portillo  1.  CABG x4, with bilateral mammary, LIMA to LAD, DINORAH as a free graft to obtuse marginal, radial to diagonal and saphenous vein graft to distal PDA.  2.  Endoscopic vein and radial harvest .  3.  The patient agreed to participate on our ongoing LeAAPs trial so he may or may not receive the left appendage clip.    CTICU course - afib - amio gtt; Endo consulted for DM management    Transferred to the floor 10/9  _________________________    Hospital/ floor  Course:  - patient was diuresis for fluid volume overload post cardiac surgery; preop wt: 120 kg, discharge wt: 123kg   - electrolytes were replaced as necessary   - pain and constipation meds given  - continued ASA, statin, BB, plavix  - continue amiodarone for postop afib  - continue Imdur x1 month for LIMA/DINORAH and radial artery graft  - encouraged patient to take bowel regimen for postop constipation  - endocrinology followed for diabetes management as patient is Type 1 with insulin pump; his sugars have been poorly controlled in the 200s-250s.  We recommend improved glucose control due to risk of sternal infection.   - Epicardial wires CUT on  10/12  - 2 view CXR done on 10/11  - PT recs for  home  - anticipate discharge home with homecare     Discharged home 10/12      On day of discharge, vital signs were stable and no acute distress was noted. All questions were and answered and much support was given. After VS and labs were reviewed it was determined the patient was stable for discharge.     Hospital day of discharge management- spent >30 minutes coordinating the discharge and counseling/educating patient and family regarding discharge instructions.         PMH:   Angina pectoris (CMS/[facility])    · C. difficile colitis      dx 7/30/23  · Coronary artery disease      [facility] 7/25/23: % ISR + collat, 1st diag 75%, LCx mid 95%, RCA mid 50%, PDA distal 90%, LVEF 45%  · Diabetes mellitus (CMS/[facility])      takes insulin and semaglutide/Rybelsus  · Hyperlipidemia    · Hypertension    · Joint pain      ankle, Charcot foot  · Old myocardial infarction      History of myocardial infarction  · Sleep apnea      + CPAP    Surgical History  None   Allergies: Iodinated contrast media    Home meds  Allopurinol; 300 mg daily   ASA 81 mg daily   Statin 40 mg daily   Gabapentin 600 mg 5 times a day   Insulin Aspart 100 unit/ml use via pump , max dose 200 units   Imdur 30mg daily   Lisinopril 2.5 mg daily   Semiglutide 7 mg tab;et   Testosterone 50 mg transdermal patch twice a day       Pertinent Physical Exam At Time of Discharge  Physical Exam  See progress note of 10/12     Home Medications     Medication List      START taking these medications     acetaminophen 325 mg tablet; Commonly known as: Tylenol; Take 2 tablets   (650 mg) by mouth every 6 hours if needed (for pain).   amiodarone 200 mg tablet; Commonly known as: Pacerone; Take 1 tablet   (200 mg) by mouth once daily.   clopidogrel 75 mg tablet; Commonly known as: Plavix; Take 1 tablet (75   mg) by mouth once daily. Do not start before October 13, 2023.; Start   taking on: October 13, 2023   docusate sodium 100 mg capsule; Commonly known as: Colace; Take 1    capsule (100 mg) by mouth 2 times a day.   furosemide 20 mg tablet; Commonly known as: Lasix; Take 1 tablet (20 mg)   by mouth once daily for 7 days.   iron polysaccharides 150 mg iron capsule; Commonly known as:   Nu-Iron,Niferex; Take 1 capsule (150 mg) by mouth once daily. Do not start   before October 13, 2023.; Start taking on: October 13, 2023   metoprolol tartrate 50 mg tablet; Commonly known as: Lopressor; Take 1   tablet by mouth 2 times a day.   multivitamin with minerals tablet; Take 1 tablet by mouth once daily.   polyethylene glycol 17 gram packet; Commonly known as: Glycolax,   Miralax; Take 17 g by mouth once daily as needed (constipation).     CHANGE how you take these medications     atorvastatin 80 mg tablet; Commonly known as: Lipitor; Take 1 tablet (80   mg) by mouth once daily.; What changed: medication strength, how much to   take   lisinopril 2.5 mg tablet; What changed: Another medication with the same   name was removed. Continue taking this medication, and follow the   directions you see here.     CONTINUE taking these medications     allopurinol 300 mg tablet; Commonly known as: Zyloprim; Take 1 tablet by   mouth daily   aspirin 81 mg EC tablet   gabapentin 600 mg tablet; Commonly known as: Neurontin   isosorbide mononitrate ER 30 mg 24 hr tablet; Commonly known as: Imdur   NovoLOG U-100 Insulin aspart 100 unit/mL injection; Generic drug:   insulin aspart   Rybelsus 7 mg tablet; Generic drug: semaglutide   testosterone 50 mg/5 gram (1 %) gel; Commonly known as: Androgel     STOP taking these medications     metoprolol succinate XL 50 mg 24 hr tablet; Commonly known as: Toprol-XL       Outpatient Follow-Up  Future Appointments   Date Time Provider Department Center   10/20/2023 10:15 AM [id number] [id number] [place]   11/2/2023  3:00 PM Car Brewster DO [id number] [facility]   11/6/2023 12:00 PM Behzad Portillo MD [id number] [place]   2/26/2024 10:45 AM Gonzalez PATIÑO  MD Rachael [id number] [place]   7/17/2024  2:00 PM Yoni Phillips MD [id number] [place]       Carol Esparza, APRN-CNP

## 2023-10-12 NOTE — SIGNIFICANT EVENT
1745    Ventricular wires cut at skin level due to surgeon preference.  Patient instructed to notify radiology of retained epicardial wires prior to any MRI procedure, and to notify Dr Lianne Portillo of any visible wires or s/s infection.

## 2023-10-12 NOTE — CARE PLAN
Problem: ADLs  Goal: Patient will perform UB and LB bathing  with independent level of assistance   Outcome: Progressing  Goal: Patient with complete upper body dressing with independent level of assistance donning and doffing all UE clothes   Outcome: Progressing  Goal: Patient with complete lower body dressing with independent level of assistance donning and doffing all LE clothes  with PRN adaptive equipment   Outcome: Progressing  Goal: Patient will complete daily grooming  independent level of assistance and PRN adaptive equipment  Outcome: Progressing  Goal: Patient will complete toileting including hygiene clothing management/hygiene with independent level of assistance and LRD.  Outcome: Progressing     Problem: EXERCISE/STRENGTHENING  Goal: Patient will complete BUE exercises for 3 sets and 10  reps in order to improve strength and activity for ADL performance.   Outcome: Progressing     Problem: TRANSFERS  Goal: Patient will perform bed mobility independent level of assistance  in order to improve safety and independence with mobility  Outcome: Progressing  Goal: Patient will complete functional transfers with least restrictive device with modified independent level of assistance.  Outcome: Progressing     Problem: MOBILITY  Goal: Patient will perform Functional mobility mod  Household distances/Community Distances with modified independent level of assistance and least restrictive device in order to improve safety and functional mobility.  Outcome: Progressing

## 2023-10-12 NOTE — PROGRESS NOTES
Tim Kenney is a 58 y.o. male on day 6 of admission presenting with Coronary artery disease of native artery of native heart with stable angina pectoris (CMS/[facility]).  Patient to be medically ready for discharge.  Final HC orders are to be sent to  HC.  Will continue to monitor patient for all home going needs.          Erasmo James RN

## 2023-10-13 ENCOUNTER — DOCUMENTATION (OUTPATIENT)
Dept: CARDIOLOGY | Facility: CLINIC | Age: 58
End: 2023-10-13
Payer: COMMERCIAL

## 2023-10-13 ENCOUNTER — PATIENT OUTREACH (OUTPATIENT)
Dept: CARDIOLOGY | Facility: CLINIC | Age: 58
End: 2023-10-13
Payer: COMMERCIAL

## 2023-10-13 ENCOUNTER — HOME HEALTH ADMISSION (OUTPATIENT)
Dept: HOME HEALTH SERVICES | Facility: HOME HEALTH | Age: 58
End: 2023-10-13
Payer: COMMERCIAL

## 2023-10-13 PROBLEM — I25.118 CORONARY ARTERY DISEASE OF NATIVE ARTERY OF NATIVE HEART WITH STABLE ANGINA PECTORIS (CMS-HCC): Status: ACTIVE | Noted: 2023-10-04

## 2023-10-13 NOTE — PROGRESS NOTES
Discharge Facility: [facility]  Discharge Diagnosis: CABG X4  Admission Date: 10/6/23  Discharge Date:  10/12/23    PCP Appointment Date:  Specialist Appointment Date: josr George office  Hospital Encounter and Summary: Linked   See discharge assessment below for further details     Engagement  Call Start Time: 0946 (10/13/2023 10:00 AM)    Medications  Medications reviewed with patient/caregiver?: Yes (10/13/2023 10:00 AM)  Is the patient having any side effects they believe may be caused by any medication additions or changes?: No (10/13/2023 10:00 AM)  Does the patient have all medications ordered at discharge?: -- (Patient sister is picking up today) (10/13/2023 10:00 AM)  Prescription Comments: Patient has taken these medicaitons at the hospital prior to discharge (10/13/2023 10:00 AM)    Appointments  Does the patient have a primary care provider?: -- (task sent to cardiology Dr Schaffer) (10/13/2023 10:00 AM)  Care Management Interventions: Advised patient to make appointment (10/13/2023 10:00 AM)  Care Management Interventions: Advised patient to keep appointment (10/13/2023 10:00 AM)  Follow Up Tasks: Appointments (10/13/2023 10:00 AM)    Self Management  What is the home health agency?: Has not heard from [facility] educated to call [facility] or myself after 3 days (10/13/2023 10:00 AM)  Has home health visited the patient within 72 hours of discharge?: Not applicable (10/13/2023 10:00 AM)  What Durable Medical Equipment (DME) was ordered?: n/a (10/13/2023 10:00 AM)    Patient Teaching  Does the patient have access to their discharge instructions?: Yes (10/13/2023 10:00 AM)  Care Management Interventions: Reviewed instructions with patient (10/13/2023 10:00 AM)  What is the patient's perception of their health status since discharge?: Improving (10/13/2023 10:00 AM)  Is the patient/caregiver able to teach back the hierarchy of who to call/visit for symptoms/problems? PCP, Specialist, Home Health nurse,  Urgent Care, ED, 911: Yes (10/13/2023 10:00 AM)    Wrap Up  Wrap Up Additional Comments: Patient denies any cardiac CP states he feel sore and has taken Tylenol with some relief. Reviewed weights and monitoring BPS for follow up appointment. Reviewed medication with patient and all questions answered (10/13/2023 10:00 AM)  Call End Time: 1003 (10/13/2023 10:00 AM)

## 2023-10-16 ENCOUNTER — TELEPHONE (OUTPATIENT)
Dept: RESEARCH | Facility: HOSPITAL | Age: 58
End: 2023-10-16
Payer: COMMERCIAL

## 2023-10-16 ENCOUNTER — HOME CARE VISIT (OUTPATIENT)
Dept: HOME HEALTH SERVICES | Facility: HOME HEALTH | Age: 58
End: 2023-10-16
Payer: COMMERCIAL

## 2023-10-16 VITALS — BODY MASS INDEX: 32.74 KG/M2 | WEIGHT: 255 LBS

## 2023-10-16 PROCEDURE — 0023 HH SOC

## 2023-10-16 PROCEDURE — G0299 HHS/HOSPICE OF RN EA 15 MIN: HCPCS

## 2023-10-16 ASSESSMENT — ENCOUNTER SYMPTOMS
PAIN: 1
MUSCLE WEAKNESS: 1
LOWEST PAIN SEVERITY IN PAST 24 HOURS: 0/10
PAIN LOCATION: CHEST
LOSS OF SENSATION IN FEET: 0
PERSON REPORTING PAIN: PATIENT
CHANGE IN APPETITE: UNCHANGED
PAIN LOCATION - PAIN SEVERITY: 1/10
APPETITE LEVEL: GOOD
DEPRESSION: 0
HIGHEST PAIN SEVERITY IN PAST 24 HOURS: 3/10
PAIN SEVERITY GOAL: 0/10
PAIN LOCATION - PAIN QUALITY: BURNING
PAIN LOCATION - PAIN FREQUENCY: INTERMITTENT
OCCASIONAL FEELINGS OF UNSTEADINESS: 0
SUBJECTIVE PAIN PROGRESSION: UNCHANGED

## 2023-10-16 ASSESSMENT — PAIN SCALES - PAIN ASSESSMENT IN ADVANCED DEMENTIA (PAINAD)
BODYLANGUAGE: 0 - RELAXED.
NEGVOCALIZATION: 0 - NONE.
TOTALSCORE: 0
FACIALEXPRESSION: 0 - SMILING OR INEXPRESSIVE.
CONSOLABILITY: 0 - NO NEED TO CONSOLE.
FACIALEXPRESSION: 0
BREATHING: 0
NEGVOCALIZATION: 0
CONSOLABILITY: 0
BODYLANGUAGE: 0

## 2023-10-16 ASSESSMENT — ACTIVITIES OF DAILY LIVING (ADL)
OASIS_M1830: 01
AMBULATION ASSISTANCE: 1
AMBULATION ASSISTANCE: STAND BY ASSIST
ENTERING_EXITING_HOME: INDEPENDENT

## 2023-10-16 NOTE — TELEPHONE ENCOUNTER
Attempted to reach Sebastian Kenney by phone to conduct Discharge Follow-Up visit for the LeAAPS study. No answer. Message left on VM and call-back number provided. Will attempt to reach him again tomorrow if he does not return call.

## 2023-10-17 ENCOUNTER — HOME CARE VISIT (OUTPATIENT)
Dept: HOME HEALTH SERVICES | Facility: HOME HEALTH | Age: 58
End: 2023-10-17
Payer: COMMERCIAL

## 2023-10-17 PROCEDURE — G0151 HHCP-SERV OF PT,EA 15 MIN: HCPCS

## 2023-10-17 ASSESSMENT — ACTIVITIES OF DAILY LIVING (ADL)
AMBULATION ASSISTANCE ON FLAT SURFACES: 1
AMBULATION ASSISTANCE ON UNEVEN SURFACES: 1
AMBULATION_DISTANCE/DURATION_TOLERATED: 150'

## 2023-10-17 ASSESSMENT — ENCOUNTER SYMPTOMS
SUBJECTIVE PAIN PROGRESSION: GRADUALLY IMPROVING
PAIN LOCATION - PAIN SEVERITY: 2/10
PAIN LOCATION: RIGHT HIP
HIGHEST PAIN SEVERITY IN PAST 24 HOURS: 2/10
PAIN: 1
LOWEST PAIN SEVERITY IN PAST 24 HOURS: 0/10

## 2023-10-18 ENCOUNTER — HOSPITAL ENCOUNTER (OUTPATIENT)
Dept: RADIOLOGY | Facility: HOSPITAL | Age: 58
Discharge: HOME | End: 2023-10-18
Payer: COMMERCIAL

## 2023-10-18 ENCOUNTER — OFFICE VISIT (OUTPATIENT)
Dept: CARDIOLOGY | Facility: CLINIC | Age: 58
End: 2023-10-18
Payer: COMMERCIAL

## 2023-10-18 VITALS
HEART RATE: 77 BPM | WEIGHT: 260 LBS | BODY MASS INDEX: 33.37 KG/M2 | DIASTOLIC BLOOD PRESSURE: 60 MMHG | SYSTOLIC BLOOD PRESSURE: 102 MMHG | HEIGHT: 74 IN

## 2023-10-18 DIAGNOSIS — Z95.1 S/P CABG X 4: ICD-10-CM

## 2023-10-18 DIAGNOSIS — Z78.9 NEVER SMOKED CIGARETTES: ICD-10-CM

## 2023-10-18 DIAGNOSIS — I25.118 CORONARY ARTERY DISEASE OF NATIVE ARTERY OF NATIVE HEART WITH STABLE ANGINA PECTORIS (CMS-HCC): ICD-10-CM

## 2023-10-18 DIAGNOSIS — E11.9 TYPE 2 DIABETES MELLITUS WITHOUT COMPLICATION, WITHOUT LONG-TERM CURRENT USE OF INSULIN (MULTI): ICD-10-CM

## 2023-10-18 DIAGNOSIS — I10 ESSENTIAL HYPERTENSION: ICD-10-CM

## 2023-10-18 DIAGNOSIS — E78.2 MIXED HYPERLIPIDEMIA: ICD-10-CM

## 2023-10-18 PROCEDURE — 3078F DIAST BP <80 MM HG: CPT | Performed by: INTERNAL MEDICINE

## 2023-10-18 PROCEDURE — 71046 X-RAY EXAM CHEST 2 VIEWS: CPT | Performed by: RADIOLOGY

## 2023-10-18 PROCEDURE — 3074F SYST BP LT 130 MM HG: CPT | Performed by: INTERNAL MEDICINE

## 2023-10-18 PROCEDURE — 71046 X-RAY EXAM CHEST 2 VIEWS: CPT | Mod: FY

## 2023-10-18 PROCEDURE — 99214 OFFICE O/P EST MOD 30 MIN: CPT | Performed by: INTERNAL MEDICINE

## 2023-10-18 PROCEDURE — 3052F HG A1C>EQUAL 8.0%<EQUAL 9.0%: CPT | Performed by: INTERNAL MEDICINE

## 2023-10-18 PROCEDURE — 4010F ACE/ARB THERAPY RXD/TAKEN: CPT | Performed by: INTERNAL MEDICINE

## 2023-10-18 PROCEDURE — 3008F BODY MASS INDEX DOCD: CPT | Performed by: INTERNAL MEDICINE

## 2023-10-18 PROCEDURE — 1036F TOBACCO NON-USER: CPT | Performed by: INTERNAL MEDICINE

## 2023-10-18 NOTE — PATIENT INSTRUCTIONS
Continue same medications/treatment.  Patient educated on proper medication use.  Patient educated on risk factor modification.  Please bring any lab results from other providers/physicians to your next appointment.    Please bring all medicines, vitamins, and herbal supplements with you when you come to the office.    Prescriptions will not be filled unless you are compliant with your follow up appointments or have a follow up appointment scheduled as per instruction of your physician. Refills should be requested at the time of your visit.    Follow up in 4 weeks    INela RN, am scribing for and in the presence of, Dr. Yoni Phillips MD, FACC

## 2023-10-18 NOTE — PROGRESS NOTES
CARDIOLOGY OFFICE VISIT      CHIEF COMPLAINT      HISTORY OF PRESENT ILLNESS  The patient is about 2 weeks post CABG.  His hospital course was basically uncomplicated.  He did have atrial fibrillation.  He has not had a obvious recurrence of atrial fibrillation since he has been placed on amiodarone.  He states that he is walking more every day.  He denies ischemic chest pain.  His musculoskeletal chest pain is improving.  He denies any significant dyspnea.  He denies palpitations and syncope.  He denies any problems medication.  I told him I would like to see him in 4 weeks prior to going to back to work and we will get him on less medications.  I would definitely most likely stop his Anmol and probably discontinue his aspirin and isosorbide.    Impression:  Coronary artery disease, no angina  CABG x4, LIMA to LAD, DINORAH free graft to OM circumflex, radial to diagonal, and SVG to PDA on 10/6/2023 at [facility]  Remote myocardial infarction  Hypertension  Hyperlipidemia  Diabetes mellitus type II  Positive family history of coronary disease  Obesity  Charcot foot, left foot           Please excuse any errors in grammar or translation related to this dictation. Voice recognition software was utilized to prepare this document.    Past Medical History      Social History  Social History     Tobacco Use    Smoking status: Never     Passive exposure: Never    Smokeless tobacco: Never   Substance Use Topics    Alcohol use: Never    Drug use: Never       Family History     Family History   Problem Relation Name Age of Onset    Other (Cardiac Disorder) Mother      Breast cancer Mother      Cancer Father          Allergies:  Allergies   Allergen Reactions    Iodinated Contrast Media Hives     IV Contrast Dye        Outpatient Medications:  Current Outpatient Medications   Medication Instructions    acetaminophen (TYLENOL) 650 mg, oral, Every 6 hours PRN    allopurinol (Zyloprim) 300 mg tablet Take 1 tablet by mouth daily     amiodarone (PACERONE) 200 mg, oral, Daily    aspirin 81 mg EC tablet 1 tablet, oral, Daily, 81 MG    atorvastatin (LIPITOR) 80 mg, oral, Daily    clopidogrel (PLAVIX) 75 mg, oral, Daily    docusate sodium (COLACE) 100 mg, oral, 2 times daily    furosemide (LASIX) 20 mg, oral, Daily    gabapentin (NEURONTIN) 600 mg, oral, 5 times daily    iron polysaccharides (NU-IRON,NIFEREX) 150 mg, oral, Daily    isosorbide mononitrate ER (IMDUR) 30 mg, oral, Daily    lisinopril 2.5 mg, oral, Daily    metoprolol tartrate (LOPRESSOR) 50 mg, oral, 2 times daily    multivitamin with minerals tablet 1 tablet, oral, Daily    NovoLOG U-100 Insulin aspart 100 unit/mL injection USE VIA INSULIN PUMP, MAX DAILY DOSE 200 UNITS    polyethylene glycol (GLYCOLAX, MIRALAX) 17 g, oral, Daily PRN    semaglutide (Rybelsus) 7 mg tablet 1 tablet, oral, Daily, As directed    testosterone (ANDROGEL) 50 mg, transdermal, 2 times daily          REVIEW OF SYSTEMS  Review of Systems   All other systems reviewed and are negative.         VITALS  There were no vitals filed for this visit.    PHYSICAL EXAM  Constitutional:       Appearance: Healthy appearance. Not in distress.   Neck:      Vascular: No JVR. JVD normal.   Pulmonary:      Effort: Pulmonary effort is normal.      Breath sounds: Normal breath sounds. No wheezing. No rhonchi. No rales.   Chest:      Chest wall: Not tender to palpatation.   Cardiovascular:      PMI at left midclavicular line. Normal rate. Regular rhythm. Normal S1. Normal S2.       Murmurs: There is no murmur.      No gallop.  No click. No rub.   Pulses:     Intact distal pulses.   Edema:     Peripheral edema absent.   Abdominal:      General: Bowel sounds are normal.      Palpations: Abdomen is soft.      Tenderness: There is no abdominal tenderness.   Musculoskeletal: Normal range of motion.         General: No tenderness. Skin:     General: Skin is warm and dry.   Neurological:      General: No focal deficit present.      Mental  Status: Alert and oriented to person, place and time.            ASSESSMENT AND PLAN      [unfilled]

## 2023-10-19 ENCOUNTER — HOME CARE VISIT (OUTPATIENT)
Dept: HOME HEALTH SERVICES | Facility: HOME HEALTH | Age: 58
End: 2023-10-19
Payer: COMMERCIAL

## 2023-10-19 VITALS
RESPIRATION RATE: 18 BRPM | HEART RATE: 90 BPM | DIASTOLIC BLOOD PRESSURE: 60 MMHG | TEMPERATURE: 97.4 F | SYSTOLIC BLOOD PRESSURE: 98 MMHG

## 2023-10-19 PROCEDURE — G0300 HHS/HOSPICE OF LPN EA 15 MIN: HCPCS

## 2023-10-19 ASSESSMENT — PAIN SCALES - PAIN ASSESSMENT IN ADVANCED DEMENTIA (PAINAD)
FACIALEXPRESSION: 0 - SMILING OR INEXPRESSIVE.
BREATHING: 0
TOTALSCORE: 0
NEGVOCALIZATION: 0 - NONE.
NEGVOCALIZATION: 0
CONSOLABILITY: 0
BODYLANGUAGE: 0 - RELAXED.
FACIALEXPRESSION: 0
BODYLANGUAGE: 0
CONSOLABILITY: 0 - NO NEED TO CONSOLE.

## 2023-10-19 ASSESSMENT — ACTIVITIES OF DAILY LIVING (ADL): MONEY MANAGEMENT (EXPENSES/BILLS): INDEPENDENT

## 2023-10-19 ASSESSMENT — ENCOUNTER SYMPTOMS
DENIES PAIN: 1
LAST BOWEL MOVEMENT: 66765
STOOL FREQUENCY: DAILY
SUBJECTIVE PAIN PROGRESSION: UNCHANGED
DEPRESSION: 0
OCCASIONAL FEELINGS OF UNSTEADINESS: 0
BOWEL PATTERN NORMAL: 1
LOSS OF SENSATION IN FEET: 1
LOWEST PAIN SEVERITY IN PAST 24 HOURS: 0/10
APPETITE LEVEL: GOOD
HIGHEST PAIN SEVERITY IN PAST 24 HOURS: 1/10
PAIN SEVERITY GOAL: 0/10
BLURRED VISION: 1
CHANGE IN APPETITE: UNCHANGED

## 2023-10-20 ENCOUNTER — TELEMEDICINE CLINICAL SUPPORT (OUTPATIENT)
Dept: CARDIAC SURGERY | Facility: CLINIC | Age: 58
End: 2023-10-20
Payer: COMMERCIAL

## 2023-10-20 NOTE — PROGRESS NOTES
"Virtual visit with Sebastian Kenney this morning. Patient states \"everyday is getting better.\"       Virtual topics discussed:  Nuero: Patient is smiley, looks calm and is interactive for visit.   Lungs: Denies sob. Advised to continue with incentive spirometer.   Surgical incision: virtual assessment of surgical sites reveal no sign of infection from chest, left radial and leg. Denies pain, takes Tylenol as needed for discomfort.   Medications Reviewed medications in detail. Today is the last day of Lasix. Reiterated importance of trending weight and adhering to a low sodium diet.    BP/WT/HR. On average /62, denies gaining weight since discharge. Denies heart palpitations.   Homecare: weekly visits scheduled at this time.   Physical activity: Ambulates without SOB or assistance.   Appts: Reviewed upcoming appts. Reminded patient of CxR appt prior seeing Dr. Salazar.       Mr. Kenney is progressing well. Aware to seek care with any signs of infection or fever. He has my phone number and advised to call as needed. Patient verbalized understanding.       Alberta Fermin RN     "

## 2023-10-20 NOTE — ADDENDUM NOTE
Addendum  created 10/20/23 0733 by Giuseppe Gaines    Intraprocedure Event edited (Perfusion), Intraprocedure Staff edited (Perfusion)

## 2023-10-23 ENCOUNTER — HOME CARE VISIT (OUTPATIENT)
Dept: HOME HEALTH SERVICES | Facility: HOME HEALTH | Age: 58
End: 2023-10-23
Payer: COMMERCIAL

## 2023-10-23 VITALS
HEART RATE: 84 BPM | OXYGEN SATURATION: 95 % | DIASTOLIC BLOOD PRESSURE: 62 MMHG | TEMPERATURE: 97.3 F | SYSTOLIC BLOOD PRESSURE: 110 MMHG | RESPIRATION RATE: 18 BRPM

## 2023-10-23 PROCEDURE — G0300 HHS/HOSPICE OF LPN EA 15 MIN: HCPCS

## 2023-10-23 ASSESSMENT — ENCOUNTER SYMPTOMS
PERSON REPORTING PAIN: PATIENT
HIGHEST PAIN SEVERITY IN PAST 24 HOURS: 2/10
BOWEL PATTERN NORMAL: 1
LOSS OF SENSATION IN FEET: 1
LAST BOWEL MOVEMENT: 66769
DEPRESSION: 0
BLURRED VISION: 1
SUBJECTIVE PAIN PROGRESSION: UNCHANGED
CHANGE IN APPETITE: UNCHANGED
LOWEST PAIN SEVERITY IN PAST 24 HOURS: 0/10
APPETITE LEVEL: GOOD
STOOL FREQUENCY: DAILY
PAIN SEVERITY GOAL: 0/10
OCCASIONAL FEELINGS OF UNSTEADINESS: 0

## 2023-10-23 ASSESSMENT — PAIN SCALES - PAIN ASSESSMENT IN ADVANCED DEMENTIA (PAINAD)
BODYLANGUAGE: 0
CONSOLABILITY: 0 - NO NEED TO CONSOLE.
BODYLANGUAGE: 0 - RELAXED.
NEGVOCALIZATION: 0
TOTALSCORE: 0
FACIALEXPRESSION: 0 - SMILING OR INEXPRESSIVE.
FACIALEXPRESSION: 0
NEGVOCALIZATION: 0 - NONE.
BREATHING: 0
CONSOLABILITY: 0

## 2023-10-23 ASSESSMENT — ACTIVITIES OF DAILY LIVING (ADL): MONEY MANAGEMENT (EXPENSES/BILLS): INDEPENDENT

## 2023-10-24 ENCOUNTER — HOSPITAL ENCOUNTER (OUTPATIENT)
Dept: CARDIOLOGY | Facility: HOSPITAL | Age: 58
Discharge: HOME | End: 2023-10-24

## 2023-10-24 ENCOUNTER — OFFICE VISIT (OUTPATIENT)
Dept: PRIMARY CARE | Facility: CLINIC | Age: 58
End: 2023-10-24
Payer: COMMERCIAL

## 2023-10-24 VITALS
HEART RATE: 80 BPM | SYSTOLIC BLOOD PRESSURE: 126 MMHG | DIASTOLIC BLOOD PRESSURE: 66 MMHG | BODY MASS INDEX: 34.11 KG/M2 | HEIGHT: 74 IN | OXYGEN SATURATION: 98 % | WEIGHT: 265.8 LBS | TEMPERATURE: 98.2 F

## 2023-10-24 DIAGNOSIS — E66.09 CLASS 1 OBESITY DUE TO EXCESS CALORIES WITH SERIOUS COMORBIDITY AND BODY MASS INDEX (BMI) OF 34.0 TO 34.9 IN ADULT: ICD-10-CM

## 2023-10-24 DIAGNOSIS — M1A.09X0 IDIOPATHIC CHRONIC GOUT OF MULTIPLE SITES WITHOUT TOPHUS: ICD-10-CM

## 2023-10-24 DIAGNOSIS — I10 ESSENTIAL HYPERTENSION: ICD-10-CM

## 2023-10-24 DIAGNOSIS — Z79.4 TYPE 2 DIABETES MELLITUS WITH DIABETIC NEUROPATHY, WITH LONG-TERM CURRENT USE OF INSULIN (MULTI): ICD-10-CM

## 2023-10-24 DIAGNOSIS — E11.40 TYPE 2 DIABETES MELLITUS WITH DIABETIC NEUROPATHY, WITH LONG-TERM CURRENT USE OF INSULIN (MULTI): ICD-10-CM

## 2023-10-24 DIAGNOSIS — E78.2 MIXED HYPERLIPIDEMIA: ICD-10-CM

## 2023-10-24 DIAGNOSIS — I25.118 CORONARY ARTERY DISEASE OF NATIVE ARTERY OF NATIVE HEART WITH STABLE ANGINA PECTORIS (CMS-HCC): Primary | ICD-10-CM

## 2023-10-24 DIAGNOSIS — Z95.1 S/P CABG X 4: ICD-10-CM

## 2023-10-24 LAB
ATRIAL RATE: 241 BPM
Q ONSET: 226 MS
QRS COUNT: 26 BEATS
QRS DURATION: 86 MS
QT INTERVAL: 304 MS
QTC CALCULATION(BAZETT): 488 MS
QTC FREDERICIA: 416 MS
R AXIS: 18 DEGREES
T AXIS: 103 DEGREES
T OFFSET: 378 MS
VENTRICULAR RATE: 155 BPM

## 2023-10-24 PROCEDURE — 4010F ACE/ARB THERAPY RXD/TAKEN: CPT | Performed by: FAMILY MEDICINE

## 2023-10-24 PROCEDURE — 99214 OFFICE O/P EST MOD 30 MIN: CPT | Performed by: FAMILY MEDICINE

## 2023-10-24 PROCEDURE — 3078F DIAST BP <80 MM HG: CPT | Performed by: FAMILY MEDICINE

## 2023-10-24 PROCEDURE — 3052F HG A1C>EQUAL 8.0%<EQUAL 9.0%: CPT | Performed by: FAMILY MEDICINE

## 2023-10-24 PROCEDURE — 3008F BODY MASS INDEX DOCD: CPT | Performed by: FAMILY MEDICINE

## 2023-10-24 PROCEDURE — 3074F SYST BP LT 130 MM HG: CPT | Performed by: FAMILY MEDICINE

## 2023-10-24 PROCEDURE — 1036F TOBACCO NON-USER: CPT | Performed by: FAMILY MEDICINE

## 2023-10-24 ASSESSMENT — ENCOUNTER SYMPTOMS
BLOOD IN STOOL: 0
CHEST TIGHTNESS: 0
ARTHRALGIAS: 0
NUMBNESS: 0
BACK PAIN: 0
FATIGUE: 0
HEMATURIA: 0
VOMITING: 0
SORE THROAT: 0
MYALGIAS: 0
DIARRHEA: 0
COUGH: 0
ADENOPATHY: 0
WEAKNESS: 0
ACTIVITY CHANGE: 0
BRUISES/BLEEDS EASILY: 0
NAUSEA: 0
NECK PAIN: 0
DYSPHORIC MOOD: 0
SHORTNESS OF BREATH: 0
HEADACHES: 0
CONSTIPATION: 0
NERVOUS/ANXIOUS: 0
EYE DISCHARGE: 0
ABDOMINAL PAIN: 0
DIFFICULTY URINATING: 0
DIZZINESS: 0

## 2023-10-24 NOTE — PROGRESS NOTES
"Subjective   Patient ID: Tim Kenney is a 58 y.o. male who presents for Hospital Follow-up.  HPI  Coronary disease stable  Status post bypass  Has follow-up with cardiology and surgery  No chest pain or shortness of breath    Diabetes stable  Stay current with endocrinology    Hypertension controlled no chest pain or shortness of breath  Tolerates medicine    High cholesterol stable watch diet    Obese  Recommend weight loss and diet    Gout stable  No progression or worsening noted  Review of Systems   Constitutional:  Negative for activity change and fatigue.   HENT:  Negative for congestion and sore throat.    Eyes:  Negative for discharge.   Respiratory:  Negative for cough, chest tightness and shortness of breath.    Cardiovascular:  Negative for chest pain and leg swelling.   Gastrointestinal:  Negative for abdominal pain, blood in stool, constipation, diarrhea, nausea and vomiting.   Endocrine: Negative for cold intolerance and heat intolerance.   Genitourinary:  Negative for difficulty urinating and hematuria.   Musculoskeletal:  Negative for arthralgias, back pain, gait problem, myalgias and neck pain.   Allergic/Immunologic: Negative for environmental allergies.   Neurological:  Negative for dizziness, syncope, weakness, numbness and headaches.   Hematological:  Negative for adenopathy. Does not bruise/bleed easily.   Psychiatric/Behavioral:  Negative for dysphoric mood. The patient is not nervous/anxious.    All other systems reviewed and are negative.      Objective   /66 (BP Location: Left arm, BP Cuff Size: Large adult)   Pulse 80   Temp 36.8 °C (98.2 °F) (Temporal)   Ht 1.88 m (6' 2\")   Wt 121 kg (265 lb 12.8 oz)   SpO2 98%   BMI 34.13 kg/m²    Physical Exam  Vitals and nursing note reviewed.   Constitutional:       General: He is not in acute distress.     Appearance: Normal appearance.   HENT:      Head: Normocephalic and atraumatic.      Right Ear: Tympanic membrane, ear canal and " external ear normal.      Left Ear: Tympanic membrane, ear canal and external ear normal.      Nose: Nose normal.      Mouth/Throat:      Mouth: Mucous membranes are moist.      Pharynx: Oropharynx is clear. No oropharyngeal exudate or posterior oropharyngeal erythema.   Eyes:      Extraocular Movements: Extraocular movements intact.      Conjunctiva/sclera: Conjunctivae normal.      Pupils: Pupils are equal, round, and reactive to light.   Cardiovascular:      Rate and Rhythm: Normal rate and regular rhythm.      Pulses: Normal pulses.      Heart sounds: Normal heart sounds. No murmur heard.  Pulmonary:      Effort: Pulmonary effort is normal. No respiratory distress.      Breath sounds: Normal breath sounds. No wheezing or rales.   Abdominal:      General: Abdomen is flat. Bowel sounds are normal. There is no distension.      Palpations: Abdomen is soft. There is no mass.      Tenderness: There is no abdominal tenderness.   Musculoskeletal:         General: No swelling or deformity. Normal range of motion.      Cervical back: Normal range of motion and neck supple.      Right lower leg: No edema.      Left lower leg: No edema.   Lymphadenopathy:      Cervical: No cervical adenopathy.   Skin:     General: Skin is warm and dry.      Capillary Refill: Capillary refill takes less than 2 seconds.      Findings: No lesion or rash.   Neurological:      General: No focal deficit present.      Mental Status: He is alert and oriented to person, place, and time.      Cranial Nerves: No cranial nerve deficit.      Motor: No weakness.   Psychiatric:         Mood and Affect: Mood normal.         Behavior: Behavior normal.         Thought Content: Thought content normal.         Judgment: Judgment normal.     Patient with healing midline sternotomy scars and vein/artery harvesting scars    Assessment/Plan   Problem List Items Addressed This Visit       Essential hypertension    Mixed hyperlipidemia    Idiopathic chronic gout of  multiple sites without tophus    Type 2 diabetes mellitus with diabetic neuropathy, with long-term current use of insulin (CMS/[facility])    Relevant Orders    Follow Up In [facility] - PCP    Class 1 obesity due to excess calories with serious comorbidity and body mass index (BMI) of 34.0 to 34.9 in adult    Coronary artery disease of native artery of native heart with stable angina pectoris (CMS/[facility]) - Primary       Patient education provided.  Stay current with age appropriate health maintenance as instructed.  Appointment here or ER with new or worsening symptoms'  Keep appropriate follow-up visit.  Stay current with proper immunizations       Keep specialist follow-up 90-day recheck care return sooner with new or worsening symptoms  Discussed with cardiothoracic surgeon appropriateness of influenza vaccine  I am inclined to suggest he get his flu shot

## 2023-10-26 ENCOUNTER — HOME CARE VISIT (OUTPATIENT)
Dept: HOME HEALTH SERVICES | Facility: HOME HEALTH | Age: 58
End: 2023-10-26
Payer: COMMERCIAL

## 2023-10-26 ENCOUNTER — PATIENT OUTREACH (OUTPATIENT)
Dept: CARDIOLOGY | Facility: CLINIC | Age: 58
End: 2023-10-26
Payer: COMMERCIAL

## 2023-10-26 VITALS
TEMPERATURE: 97.2 F | DIASTOLIC BLOOD PRESSURE: 70 MMHG | HEART RATE: 72 BPM | OXYGEN SATURATION: 96 % | SYSTOLIC BLOOD PRESSURE: 112 MMHG | RESPIRATION RATE: 18 BRPM

## 2023-10-26 PROCEDURE — G0300 HHS/HOSPICE OF LPN EA 15 MIN: HCPCS

## 2023-10-26 ASSESSMENT — ENCOUNTER SYMPTOMS
BLURRED VISION: 1
APPETITE LEVEL: GOOD
LAST BOWEL MOVEMENT: 66773
LOSS OF SENSATION IN FEET: 1
PAIN SEVERITY GOAL: 0/10
LOWEST PAIN SEVERITY IN PAST 24 HOURS: 0/10
SUBJECTIVE PAIN PROGRESSION: UNCHANGED
DEPRESSION: 0
BOWEL PATTERN NORMAL: 1
DENIES PAIN: 1
STOOL FREQUENCY: DAILY
PERSON REPORTING PAIN: PATIENT
OCCASIONAL FEELINGS OF UNSTEADINESS: 0
CHANGE IN APPETITE: UNCHANGED
HIGHEST PAIN SEVERITY IN PAST 24 HOURS: 1/10

## 2023-10-26 ASSESSMENT — ACTIVITIES OF DAILY LIVING (ADL): MONEY MANAGEMENT (EXPENSES/BILLS): INDEPENDENT

## 2023-10-26 ASSESSMENT — PAIN SCALES - PAIN ASSESSMENT IN ADVANCED DEMENTIA (PAINAD)
NEGVOCALIZATION: 0 - NONE.
BODYLANGUAGE: 0 - RELAXED.
FACIALEXPRESSION: 0 - SMILING OR INEXPRESSIVE.
FACIALEXPRESSION: 0
TOTALSCORE: 0
BREATHING: 0
CONSOLABILITY: 0 - NO NEED TO CONSOLE.
CONSOLABILITY: 0
NEGVOCALIZATION: 0
BODYLANGUAGE: 0

## 2023-10-26 NOTE — PROGRESS NOTES
Call regarding appt. with PCP on 10/18/23 after hospitalization.  At time of outreach call the patient feels as if their condition has improved since last visit.  Reviewed the PCP appointment with the pt and addressed any questions or concerns. Patient continues with [facility] nurse. States incisions are looking well and he has been increasing his activity level slowly. Denies and CP or SOB.

## 2023-10-26 NOTE — HOME HEALTH
SN visit completed. VSS. Denies pain. Incisions LOTA per orders. No drainage or s/s infection noted. No concerns.

## 2023-10-26 NOTE — ADDENDUM NOTE
Addendum  created 10/26/23 1147 by Giuseppe Gaines    Attestation recorded in Intraprocedure (Perfusion), Intraprocedure Attestations filed (Perfusion)

## 2023-10-27 NOTE — ADDENDUM NOTE
Addendum  created 10/27/23 0910 by Azra Carranza    Attestation recorded in Intraprocedure (Perfusion), Intraprocedure Attestations filed (Perfusion)

## 2023-10-30 ENCOUNTER — APPOINTMENT (OUTPATIENT)
Dept: HOME HEALTH SERVICES | Facility: HOME HEALTH | Age: 58
End: 2023-10-30
Payer: COMMERCIAL

## 2023-10-30 ENCOUNTER — HOME CARE VISIT (OUTPATIENT)
Dept: HOME HEALTH SERVICES | Facility: HOME HEALTH | Age: 58
End: 2023-10-30
Payer: COMMERCIAL

## 2023-10-30 VITALS
SYSTOLIC BLOOD PRESSURE: 104 MMHG | TEMPERATURE: 97.3 F | RESPIRATION RATE: 18 BRPM | DIASTOLIC BLOOD PRESSURE: 70 MMHG | HEART RATE: 98 BPM

## 2023-10-30 PROCEDURE — G0300 HHS/HOSPICE OF LPN EA 15 MIN: HCPCS

## 2023-10-30 ASSESSMENT — PAIN SCALES - PAIN ASSESSMENT IN ADVANCED DEMENTIA (PAINAD)
FACIALEXPRESSION: 0 - SMILING OR INEXPRESSIVE.
NEGVOCALIZATION: 0
BREATHING: 0
TOTALSCORE: 0
BODYLANGUAGE: 0
CONSOLABILITY: 0 - NO NEED TO CONSOLE.
BODYLANGUAGE: 0 - RELAXED.
NEGVOCALIZATION: 0 - NONE.
CONSOLABILITY: 0
FACIALEXPRESSION: 0

## 2023-10-30 ASSESSMENT — ENCOUNTER SYMPTOMS
APPETITE LEVEL: GOOD
SUBJECTIVE PAIN PROGRESSION: UNCHANGED
OCCASIONAL FEELINGS OF UNSTEADINESS: 0
DEPRESSION: 0
LOSS OF SENSATION IN FEET: 1
CHANGE IN APPETITE: UNCHANGED
LOWEST PAIN SEVERITY IN PAST 24 HOURS: 0/10
BLURRED VISION: 1
PERSON REPORTING PAIN: PATIENT
LAST BOWEL MOVEMENT: 66777
BOWEL PATTERN NORMAL: 1
PAIN SEVERITY GOAL: 0/10
HIGHEST PAIN SEVERITY IN PAST 24 HOURS: 0/10
STOOL FREQUENCY: DAILY
DENIES PAIN: 1

## 2023-10-30 ASSESSMENT — ACTIVITIES OF DAILY LIVING (ADL): MONEY MANAGEMENT (EXPENSES/BILLS): INDEPENDENT

## 2023-10-31 ENCOUNTER — HOSPITAL ENCOUNTER (OUTPATIENT)
Dept: RADIOLOGY | Facility: HOSPITAL | Age: 58
Discharge: HOME | End: 2023-10-31
Payer: COMMERCIAL

## 2023-10-31 DIAGNOSIS — Z95.1 S/P CABG (CORONARY ARTERY BYPASS GRAFT): ICD-10-CM

## 2023-10-31 PROCEDURE — 71046 X-RAY EXAM CHEST 2 VIEWS: CPT | Performed by: STUDENT IN AN ORGANIZED HEALTH CARE EDUCATION/TRAINING PROGRAM

## 2023-10-31 PROCEDURE — 71046 X-RAY EXAM CHEST 2 VIEWS: CPT

## 2023-11-02 ENCOUNTER — APPOINTMENT (OUTPATIENT)
Dept: PULMONOLOGY | Facility: CLINIC | Age: 58
End: 2023-11-02
Payer: COMMERCIAL

## 2023-11-03 ENCOUNTER — HOME CARE VISIT (OUTPATIENT)
Dept: HOME HEALTH SERVICES | Facility: HOME HEALTH | Age: 58
End: 2023-11-03
Payer: COMMERCIAL

## 2023-11-03 ENCOUNTER — OFFICE VISIT (OUTPATIENT)
Dept: ENDOCRINOLOGY | Age: 58
End: 2023-11-03
Payer: COMMERCIAL

## 2023-11-03 VITALS
OXYGEN SATURATION: 95 % | HEART RATE: 73 BPM | HEIGHT: 74 IN | SYSTOLIC BLOOD PRESSURE: 110 MMHG | DIASTOLIC BLOOD PRESSURE: 69 MMHG | WEIGHT: 265 LBS | BODY MASS INDEX: 34.01 KG/M2

## 2023-11-03 DIAGNOSIS — E29.1 HYPOGONADISM IN MALE: Primary | ICD-10-CM

## 2023-11-03 DIAGNOSIS — Z96.41 INSULIN PUMP IN PLACE: ICD-10-CM

## 2023-11-03 DIAGNOSIS — E11.42 DIABETIC POLYNEUROPATHY ASSOCIATED WITH TYPE 2 DIABETES MELLITUS (HCC): ICD-10-CM

## 2023-11-03 LAB
CHP ED QC CHECK: NORMAL
GLUCOSE BLD-MCNC: 152 MG/DL

## 2023-11-03 PROCEDURE — 3052F HG A1C>EQUAL 8.0%<EQUAL 9.0%: CPT | Performed by: INTERNAL MEDICINE

## 2023-11-03 PROCEDURE — G8484 FLU IMMUNIZE NO ADMIN: HCPCS | Performed by: INTERNAL MEDICINE

## 2023-11-03 PROCEDURE — 99213 OFFICE O/P EST LOW 20 MIN: CPT | Performed by: INTERNAL MEDICINE

## 2023-11-03 PROCEDURE — 1036F TOBACCO NON-USER: CPT | Performed by: INTERNAL MEDICINE

## 2023-11-03 PROCEDURE — 3074F SYST BP LT 130 MM HG: CPT | Performed by: INTERNAL MEDICINE

## 2023-11-03 PROCEDURE — G8427 DOCREV CUR MEDS BY ELIG CLIN: HCPCS | Performed by: INTERNAL MEDICINE

## 2023-11-03 PROCEDURE — 3017F COLORECTAL CA SCREEN DOC REV: CPT | Performed by: INTERNAL MEDICINE

## 2023-11-03 PROCEDURE — G8417 CALC BMI ABV UP PARAM F/U: HCPCS | Performed by: INTERNAL MEDICINE

## 2023-11-03 PROCEDURE — 82962 GLUCOSE BLOOD TEST: CPT | Performed by: INTERNAL MEDICINE

## 2023-11-03 PROCEDURE — 2022F DILAT RTA XM EVC RTNOPTHY: CPT | Performed by: INTERNAL MEDICINE

## 2023-11-03 PROCEDURE — 3078F DIAST BP <80 MM HG: CPT | Performed by: INTERNAL MEDICINE

## 2023-11-03 RX ORDER — METOPROLOL TARTRATE 50 MG/1
50 TABLET, FILM COATED ORAL 2 TIMES DAILY
COMMUNITY
Start: 2023-10-12

## 2023-11-03 RX ORDER — CLOPIDOGREL BISULFATE 75 MG/1
TABLET ORAL
COMMUNITY
Start: 2023-10-12

## 2023-11-03 RX ORDER — IRON,CARBONYL/ASCORBIC ACID 100-250 MG
TABLET ORAL
COMMUNITY

## 2023-11-03 RX ORDER — AMIODARONE HYDROCHLORIDE 200 MG/1
200 TABLET ORAL DAILY
COMMUNITY
Start: 2023-10-12

## 2023-11-03 RX ORDER — DIPHENHYDRAMINE HCL 25 MG
TABLET ORAL
COMMUNITY
Start: 2023-09-20 | End: 2023-11-03 | Stop reason: ALTCHOICE

## 2023-11-03 RX ORDER — ATORVASTATIN CALCIUM 80 MG/1
80 TABLET, FILM COATED ORAL DAILY
COMMUNITY
Start: 2023-10-12

## 2023-11-03 NOTE — PROGRESS NOTES
[Barium-Containing Compounds] Hives    Iodinated Contrast Media Hives       Current Outpatient Medications:     amiodarone (CORDARONE) 200 MG tablet, Take 1 tablet by mouth daily, Disp: , Rfl:     atorvastatin (LIPITOR) 80 MG tablet, Take 1 tablet by mouth daily, Disp: , Rfl:     clopidogrel (PLAVIX) 75 MG tablet, TAKE 1 TABLET (75 MG) BY MOUTH ONCE DAILY.  DO NOT START BEFORE OCTOBER 13, 2023., Disp: , Rfl:     metoprolol tartrate (LOPRESSOR) 50 MG tablet, Take 1 tablet by mouth 2 times daily, Disp: , Rfl:     Iron-Vitamin C (IRON 100/C) 100-250 MG TABS, Take by mouth, Disp: , Rfl:     NOVOLOG 100 UNIT/ML injection vial, USE VIA INSULIN PUMP, MAX DAILY DOSE 200 UNITS, Disp: 180 mL, Rfl: 3    gentamicin (GARAMYCIN) 0.1 % ointment, , Disp: , Rfl:     metroNIDAZOLE (FLAGYL) 500 MG tablet, , Disp: , Rfl:     vancomycin (VANCOCIN) 125 MG capsule, Take 1 capsule by mouth 4 times daily, Disp: , Rfl:     metoprolol succinate (TOPROL XL) 50 MG extended release tablet, Take 1 tablet by mouth daily, Disp: , Rfl:     sildenafil (VIAGRA) 100 MG tablet, Take 1 tablet by mouth as needed for Erectile Dysfunction, Disp: 30 tablet, Rfl: 3    aspirin 81 MG EC tablet, Take 1 tablet by mouth daily, Disp: , Rfl:     gabapentin (NEURONTIN) 600 MG tablet, TAKE 1 TABLET BY MOUTH FOUR TIMES A DAY, Disp: , Rfl:     lisinopril (PRINIVIL;ZESTRIL) 10 MG tablet, Take by mouth, Disp: , Rfl:     Accu-Chek FastClix Lancets MISC, Test 4x daily, Disp: 150 each, Rfl: 3    blood glucose test strips (ACCU-CHEK GUIDE) strip, Test 4x daily, Disp: 150 each, Rfl: 3    Insulin Pen Needle (NOVOFINE) 32G X 6 MM MISC, Tid, Disp: 300 each, Rfl: 3    allopurinol (ZYLOPRIM) 300 MG tablet, TAKE 1 TABLET BY MOUTH EVERY DAY, Disp: 90 tablet, Rfl: 1    benazepril (LOTENSIN) 20 MG tablet, TAKE 1 TABLET BY MOUTH EVERY DAY, Disp: 90 tablet, Rfl: 1    omega-3 acid ethyl esters (LOVAZA) 1 g capsule, Take 2 capsules by mouth 2 times daily, Disp: 360 capsule, Rfl: 3

## 2023-11-06 ENCOUNTER — APPOINTMENT (OUTPATIENT)
Dept: CARDIAC SURGERY | Facility: CLINIC | Age: 58
End: 2023-11-06
Payer: COMMERCIAL

## 2023-11-07 ASSESSMENT — ENCOUNTER SYMPTOMS: EYES NEGATIVE: 1

## 2023-11-09 ENCOUNTER — APPOINTMENT (OUTPATIENT)
Dept: CARDIAC SURGERY | Facility: CLINIC | Age: 58
End: 2023-11-09
Payer: COMMERCIAL

## 2023-11-09 DIAGNOSIS — G62.9 POLYNEUROPATHY, UNSPECIFIED: ICD-10-CM

## 2023-11-09 RX ORDER — GABAPENTIN 600 MG/1
600 TABLET ORAL
Qty: 150 TABLET | Refills: 2 | Status: SHIPPED | OUTPATIENT
Start: 2023-11-09 | End: 2024-02-15

## 2023-11-09 ASSESSMENT — PAIN SCALES - PAIN ASSESSMENT IN ADVANCED DEMENTIA (PAINAD)
BODYLANGUAGE: 0
FACIALEXPRESSION: 0
BREATHING: 0
NEGVOCALIZATION: 0 - NONE.
CONSOLABILITY: 0
NEGVOCALIZATION: 0
FACIALEXPRESSION: 0 - SMILING OR INEXPRESSIVE.
BODYLANGUAGE: 0 - RELAXED.
CONSOLABILITY: 0 - NO NEED TO CONSOLE.
TOTALSCORE: 0

## 2023-11-09 ASSESSMENT — ENCOUNTER SYMPTOMS
LOWEST PAIN SEVERITY IN PAST 24 HOURS: 0/10
SUBJECTIVE PAIN PROGRESSION: UNCHANGED
HIGHEST PAIN SEVERITY IN PAST 24 HOURS: 0/10
PAIN SEVERITY GOAL: 0/10
PERSON REPORTING PAIN: PATIENT
DENIES PAIN: 1

## 2023-11-09 ASSESSMENT — ACTIVITIES OF DAILY LIVING (ADL)
OASIS_M1830: 00
HOME_HEALTH_OASIS: 00

## 2023-11-10 DIAGNOSIS — Z95.1 S/P CABG X 4: ICD-10-CM

## 2023-11-10 DIAGNOSIS — Z95.1 S/P CABG X 4: Primary | ICD-10-CM

## 2023-11-10 RX ORDER — DIPHENHYDRAMINE HYDROCHLORIDE 25 MG/1
CAPSULE ORAL
COMMUNITY
Start: 2023-09-20 | End: 2023-11-29 | Stop reason: ALTCHOICE

## 2023-11-10 RX ORDER — IRON POLYSACCHARIDE COMPLEX 150 MG
150 CAPSULE ORAL DAILY
Qty: 30 CAPSULE | Refills: 0 | Status: SHIPPED | OUTPATIENT
Start: 2023-11-10 | End: 2023-11-29 | Stop reason: ALTCHOICE

## 2023-11-10 RX ORDER — AMIODARONE HYDROCHLORIDE 200 MG/1
200 TABLET ORAL DAILY
Qty: 30 TABLET | Refills: 0 | Status: SHIPPED | OUTPATIENT
Start: 2023-11-10 | End: 2023-12-06

## 2023-11-10 RX ORDER — ISOSORBIDE MONONITRATE 30 MG/1
30 TABLET, EXTENDED RELEASE ORAL DAILY
Qty: 30 TABLET | Refills: 0 | Status: SHIPPED | OUTPATIENT
Start: 2023-11-10 | End: 2023-12-29

## 2023-11-10 RX ORDER — METOPROLOL TARTRATE 50 MG/1
50 TABLET ORAL 2 TIMES DAILY
Qty: 60 TABLET | Refills: 0 | Status: SHIPPED | OUTPATIENT
Start: 2023-11-10 | End: 2023-11-29 | Stop reason: SDUPTHER

## 2023-11-10 RX ORDER — MUPIROCIN 20 MG/G
OINTMENT TOPICAL
COMMUNITY
Start: 2023-10-02 | End: 2023-11-29 | Stop reason: ALTCHOICE

## 2023-11-10 RX ORDER — CLOPIDOGREL BISULFATE 75 MG/1
75 TABLET ORAL DAILY
Qty: 30 TABLET | Refills: 0 | Status: SHIPPED | OUTPATIENT
Start: 2023-11-10 | End: 2023-11-29 | Stop reason: SDUPTHER

## 2023-11-10 NOTE — TELEPHONE ENCOUNTER
Medication renewal request:  iron polysaccharides (Nu-Iron,Niferex) 150 mg iron capsule   Last written: 10.12.2023 30 day supply by Carol Schaffer is Cardiologist.  First prescribed iron on 10.06.2023 prior to surgery.  Last AVS with Jacqueline states only taking him off of Isosorbide and Asprin in 4 weeks.      RX PENDED to [facility] as directed

## 2023-11-10 NOTE — TELEPHONE ENCOUNTER
Rx Refill Request Telephone Encounter    Name:  Sebastian Kenney  :  [id number]  Medication Name:  iron polysaccharides 150mg iron capsule  Specific Pharmacy location:  [facility]  Date of last appointment:  10/24/23  Date of next appointment:  24  Best number to reach patient:  [phone]    Pt stated cardiologist who originally prescribed this would not refill, was advised by them, family doctor would need to refill. Please advise.

## 2023-11-13 ENCOUNTER — TELEPHONE (OUTPATIENT)
Dept: CARDIOLOGY | Facility: CLINIC | Age: 58
End: 2023-11-13
Payer: COMMERCIAL

## 2023-11-13 DIAGNOSIS — Z95.1 S/P CABG X 4: ICD-10-CM

## 2023-11-14 ENCOUNTER — TELEPHONE (OUTPATIENT)
Dept: RESEARCH | Facility: HOSPITAL | Age: 58
End: 2023-11-14
Payer: COMMERCIAL

## 2023-11-14 RX ORDER — ATORVASTATIN CALCIUM 80 MG/1
80 TABLET, FILM COATED ORAL DAILY
Qty: 90 TABLET | Refills: 3 | Status: SHIPPED | OUTPATIENT
Start: 2023-11-14

## 2023-11-16 ENCOUNTER — APPOINTMENT (OUTPATIENT)
Dept: CARDIAC SURGERY | Facility: CLINIC | Age: 58
End: 2023-11-16
Payer: COMMERCIAL

## 2023-11-27 ENCOUNTER — PATIENT OUTREACH (OUTPATIENT)
Dept: CARDIOLOGY | Facility: CLINIC | Age: 58
End: 2023-11-27
Payer: COMMERCIAL

## 2023-11-27 DIAGNOSIS — Z95.1 S/P CABG (CORONARY ARTERY BYPASS GRAFT): ICD-10-CM

## 2023-11-29 ENCOUNTER — OFFICE VISIT (OUTPATIENT)
Dept: CARDIOLOGY | Facility: CLINIC | Age: 58
End: 2023-11-29
Payer: COMMERCIAL

## 2023-11-29 VITALS
SYSTOLIC BLOOD PRESSURE: 138 MMHG | HEIGHT: 74 IN | DIASTOLIC BLOOD PRESSURE: 72 MMHG | OXYGEN SATURATION: 95 % | HEART RATE: 69 BPM | BODY MASS INDEX: 35.14 KG/M2 | WEIGHT: 273.8 LBS

## 2023-11-29 DIAGNOSIS — I25.118 CORONARY ARTERY DISEASE OF NATIVE ARTERY OF NATIVE HEART WITH STABLE ANGINA PECTORIS (CMS-HCC): ICD-10-CM

## 2023-11-29 DIAGNOSIS — Z82.49 FAMILY HISTORY OF ISCHEMIC HEART DISEASE: ICD-10-CM

## 2023-11-29 DIAGNOSIS — Z95.1 S/P CABG X 4: ICD-10-CM

## 2023-11-29 DIAGNOSIS — Z79.4 TYPE 2 DIABETES MELLITUS WITH DIABETIC NEUROPATHY, WITH LONG-TERM CURRENT USE OF INSULIN (MULTI): ICD-10-CM

## 2023-11-29 DIAGNOSIS — Z78.9 NEVER SMOKED CIGARETTES: ICD-10-CM

## 2023-11-29 DIAGNOSIS — I25.2 OLD MI (MYOCARDIAL INFARCTION): ICD-10-CM

## 2023-11-29 DIAGNOSIS — I10 ESSENTIAL HYPERTENSION: ICD-10-CM

## 2023-11-29 DIAGNOSIS — E11.40 TYPE 2 DIABETES MELLITUS WITH DIABETIC NEUROPATHY, WITH LONG-TERM CURRENT USE OF INSULIN (MULTI): ICD-10-CM

## 2023-11-29 DIAGNOSIS — Q25.0 PDA (PATENT DUCTUS ARTERIOSUS) (HHS-HCC): ICD-10-CM

## 2023-11-29 DIAGNOSIS — E78.2 MIXED HYPERLIPIDEMIA: ICD-10-CM

## 2023-11-29 PROBLEM — I25.709 ATHEROSCLEROSIS OF CORONARY ARTERY BYPASS GRAFT OF NATIVE HEART WITH ANGINA PECTORIS (CMS-HCC): Status: ACTIVE | Noted: 2023-10-04

## 2023-11-29 PROCEDURE — 3075F SYST BP GE 130 - 139MM HG: CPT | Performed by: INTERNAL MEDICINE

## 2023-11-29 PROCEDURE — 3052F HG A1C>EQUAL 8.0%<EQUAL 9.0%: CPT | Performed by: INTERNAL MEDICINE

## 2023-11-29 PROCEDURE — 1036F TOBACCO NON-USER: CPT | Performed by: INTERNAL MEDICINE

## 2023-11-29 PROCEDURE — 99214 OFFICE O/P EST MOD 30 MIN: CPT | Performed by: INTERNAL MEDICINE

## 2023-11-29 PROCEDURE — 3078F DIAST BP <80 MM HG: CPT | Performed by: INTERNAL MEDICINE

## 2023-11-29 PROCEDURE — 3008F BODY MASS INDEX DOCD: CPT | Performed by: INTERNAL MEDICINE

## 2023-11-29 PROCEDURE — 4010F ACE/ARB THERAPY RXD/TAKEN: CPT | Performed by: INTERNAL MEDICINE

## 2023-11-29 RX ORDER — METOPROLOL TARTRATE 50 MG/1
50 TABLET ORAL 2 TIMES DAILY
Qty: 180 TABLET | Refills: 3 | Status: SHIPPED | OUTPATIENT
Start: 2023-11-29 | End: 2024-11-28

## 2023-11-29 RX ORDER — CLOPIDOGREL BISULFATE 75 MG/1
75 TABLET ORAL DAILY
Qty: 90 TABLET | Refills: 3 | Status: SHIPPED | OUTPATIENT
Start: 2023-11-29 | End: 2024-11-28

## 2023-11-29 NOTE — PATIENT INSTRUCTIONS
Finish current supply of Amiodarone then discontinue.  Decrease Isosorbide to 1/2 tablet for 3 days then discontinue   Stop Iron  Keep follow up as scheduled.  Patient educated on proper medication use.   Patient educated on risk factor modification.   Please bring any lab results from other providers / physicians to your next appointment.     Please bring all medicines, vitamins, and herbal supplements with you when you come to the office.     Prescriptions will not be filled unless you are compliant with your follow up appointments or have a follow up appointment scheduled as per instruction of your physician. Refills should be requested at the time of your visit.

## 2023-11-29 NOTE — PROGRESS NOTES
CARDIOLOGY OFFICE VISIT      CHIEF COMPLAINT      HISTORY OF PRESENT ILLNESS  The patient states she is doing well.  He is approximately 7 and half weeks post CABG.  He is not having any significant chest discomfort.  He denies any symptoms of angina pectoris.  He denies any significant problem with dyspnea.  He denies palpitation and syncope.  I instructed him that I wanted to discontinue several his medications.  He understands.  I will see him with his previously scheduled appointment in June of next year.      Impression:  Coronary artery disease, no angina  CABG x4, LIMA to LAD, DINORAH free graft to OM circumflex, radial to diagonal, and SVG to PDA on 10/6/2023 at [facility]  Remote myocardial infarction  Hypertension  Hyperlipidemia  Diabetes mellitus type II  Positive family history of coronary disease  Obesity  Charcot foot, left foot           Please excuse any errors in grammar or translation related to this dictation. Voice recognition software was utilized to prepare this document.        Past Medical History  Past Medical History:   Diagnosis Date    Angina pectoris (CMS/[facility])     C. difficile colitis     dx 7/30/23    Coronary artery disease     [facility] 7/25/23: % ISR + collat, 1st diag 75%, LCx mid 95%, RCA mid 50%, PDA distal 90%, LVEF 45%    Diabetes mellitus (CMS/[facility])     takes insulin and semaglutide/Rybelsus    Hyperlipidemia     Hypertension     Joint pain     ankle, Charcot foot    Old myocardial infarction     History of myocardial infarction    Sleep apnea     + CPAP       Social History  Social History     Tobacco Use    Smoking status: Never     Passive exposure: Never    Smokeless tobacco: Never   Substance Use Topics    Alcohol use: Yes     Comment: rare    Drug use: Never       Family History     Family History   Problem Relation Name Age of Onset    Other (Cardiac Disorder) Mother      Breast cancer Mother      Cancer Father          Allergies:  Allergies   Allergen Reactions    Iodinated  Contrast Media Hives     IV Contrast Dye        Outpatient Medications:  Current Outpatient Medications   Medication Instructions    acetaminophen (TYLENOL) 650 mg, oral, Every 6 hours PRN    allopurinol (Zyloprim) 300 mg tablet Take 1 tablet by mouth daily    amiodarone (PACERONE) 200 mg, oral, Daily    aspirin 81 mg EC tablet 1 tablet, oral, Daily, 81 MG    atorvastatin (LIPITOR) 80 mg, oral, Daily    clopidogrel (PLAVIX) 75 mg, oral, Daily    gabapentin (NEURONTIN) 600 mg, oral, 5 times daily    iron polysaccharides (NU-IRON,NIFEREX) 150 mg, oral, Daily    isosorbide mononitrate ER (IMDUR) 30 mg, oral, Daily    lisinopril 2.5 mg, oral, Daily    metoprolol tartrate (LOPRESSOR) 50 mg, oral, 2 times daily    multivitamin with minerals tablet 1 tablet, oral, Daily    NovoLOG U-100 Insulin aspart 100 unit/mL injection USE VIA INSULIN PUMP, MAX DAILY DOSE 200 UNITS    polyethylene glycol (GLYCOLAX, MIRALAX) 17 g, oral, Daily PRN    semaglutide (Rybelsus) 7 mg tablet 1 tablet, oral, Daily, As directed    testosterone (ANDROGEL) 50 mg, transdermal, 2 times daily          REVIEW OF SYSTEMS  Review of Systems   All other systems reviewed and are negative.        VITALS  Vitals:    11/29/23 1337   BP: 138/72   Pulse: 69   SpO2: 95%       PHYSICAL EXAM  Vitals reviewed.   Constitutional:       Appearance: Normal and healthy appearance. Well-developed and not in distress.   Eyes:      Conjunctiva/sclera: Conjunctivae normal.      Pupils: Pupils are equal, round, and reactive to light.   Neck:      Vascular: No JVR. JVD normal.   Pulmonary:      Effort: Pulmonary effort is normal.      Breath sounds: Normal breath sounds. No wheezing. No rhonchi. No rales.   Chest:      Chest wall: Not tender to palpatation.   Cardiovascular:      PMI at left midclavicular line. Normal rate. Regular rhythm. Normal S1. Normal S2.       Murmurs: There is no murmur.      No gallop.  No click. No rub.   Pulses:     Intact distal pulses.   Edema:      Peripheral edema absent.   Abdominal:      Tenderness: There is no abdominal tenderness.   Musculoskeletal: Normal range of motion.         General: No tenderness.      Cervical back: Normal range of motion. Skin:     General: Skin is warm and dry.   Neurological:      General: No focal deficit present.      Mental Status: Alert and oriented to person, place and time.   Psychiatric:         Behavior: Behavior is cooperative.           ASSESSMENT AND PLAN  Diagnoses and all orders for this visit:  S/P CABG x 4  Coronary artery disease of native artery of native heart with stable angina pectoris (CMS/[facility])  PDA (patent ductus arteriosus)  Old MI (myocardial infarction)  Essential hypertension  Mixed hyperlipidemia  Type 2 diabetes mellitus with diabetic neuropathy, with long-term current use of insulin (CMS/[facility])  Family history of ischemic heart disease  Never smoked cigarettes  BMI 35.0-35.9,adult      [unfilled]

## 2023-11-30 ENCOUNTER — OFFICE VISIT (OUTPATIENT)
Dept: CARDIAC SURGERY | Facility: CLINIC | Age: 58
End: 2023-11-30
Payer: COMMERCIAL

## 2023-11-30 VITALS
DIASTOLIC BLOOD PRESSURE: 66 MMHG | SYSTOLIC BLOOD PRESSURE: 125 MMHG | OXYGEN SATURATION: 94 % | HEART RATE: 71 BPM | TEMPERATURE: 97.3 F | RESPIRATION RATE: 14 BRPM

## 2023-11-30 DIAGNOSIS — I25.10 CAD IN NATIVE ARTERY: Primary | ICD-10-CM

## 2023-11-30 PROCEDURE — 99213 OFFICE O/P EST LOW 20 MIN: CPT | Performed by: THORACIC SURGERY (CARDIOTHORACIC VASCULAR SURGERY)

## 2023-11-30 PROCEDURE — 3074F SYST BP LT 130 MM HG: CPT | Performed by: THORACIC SURGERY (CARDIOTHORACIC VASCULAR SURGERY)

## 2023-11-30 PROCEDURE — 3078F DIAST BP <80 MM HG: CPT | Performed by: THORACIC SURGERY (CARDIOTHORACIC VASCULAR SURGERY)

## 2023-11-30 PROCEDURE — 3052F HG A1C>EQUAL 8.0%<EQUAL 9.0%: CPT | Performed by: THORACIC SURGERY (CARDIOTHORACIC VASCULAR SURGERY)

## 2023-11-30 PROCEDURE — 3008F BODY MASS INDEX DOCD: CPT | Performed by: THORACIC SURGERY (CARDIOTHORACIC VASCULAR SURGERY)

## 2023-11-30 PROCEDURE — 4010F ACE/ARB THERAPY RXD/TAKEN: CPT | Performed by: THORACIC SURGERY (CARDIOTHORACIC VASCULAR SURGERY)

## 2023-11-30 PROCEDURE — 1036F TOBACCO NON-USER: CPT | Performed by: THORACIC SURGERY (CARDIOTHORACIC VASCULAR SURGERY)

## 2023-11-30 NOTE — PROGRESS NOTES
CARDIOTHORACIC SURGERY FOLLOW-UP PROGRESS NOTE  Subjective   Tim Kenney [id number] 1965 11/30/2023    Patient is a 58 y.o. male who presents for routine post-operative follow up. He denies any present complaints. Activity level has been appropriate.       Objective   /66 (BP Location: Right arm, Patient Position: Sitting)   Pulse 71   Temp 36.3 °C (97.3 °F) (Temporal)   Resp 14   SpO2 94%   Heart: Regular rate and rhythm, S1, S2 normal, no murmur, click, rub or gallop.  Lungs: Clear to auscultation bilaterally.  Abdomen: Soft, non-distended, non-tender to palpation.  Extremities: Warm, well perfused, pulses intact, no cyanosis, clubbing, or edema.  Neuro: Alert and oriented X4, moving all extremities with no deficits observed.  Incision(s): Well healing without erythema or drainage.  Imaging: Chest x-ray reviewed. Sternum healing well.    Assessment/Plan   There are no diagnoses linked to this encounter.  DOING VERY WELL  Behzad Portillo MD

## 2023-12-03 DIAGNOSIS — Z95.1 S/P CABG X 4: ICD-10-CM

## 2023-12-03 DIAGNOSIS — I48.91 ATRIAL FIBRILLATION, UNSPECIFIED TYPE (MULTI): ICD-10-CM

## 2023-12-06 ENCOUNTER — OFFICE VISIT (OUTPATIENT)
Dept: PULMONOLOGY | Facility: CLINIC | Age: 58
End: 2023-12-06
Payer: COMMERCIAL

## 2023-12-06 VITALS
HEART RATE: 76 BPM | BODY MASS INDEX: 34.77 KG/M2 | SYSTOLIC BLOOD PRESSURE: 114 MMHG | WEIGHT: 270.8 LBS | OXYGEN SATURATION: 93 % | TEMPERATURE: 97.3 F | DIASTOLIC BLOOD PRESSURE: 72 MMHG

## 2023-12-06 DIAGNOSIS — Z99.89 HISTORY OF CONTINUOUS POSITIVE AIRWAY PRESSURE (CPAP) THERAPY AT HOME: ICD-10-CM

## 2023-12-06 DIAGNOSIS — G47.33 OBSTRUCTIVE SLEEP APNEA: Primary | ICD-10-CM

## 2023-12-06 PROCEDURE — 1036F TOBACCO NON-USER: CPT | Performed by: INTERNAL MEDICINE

## 2023-12-06 PROCEDURE — 3074F SYST BP LT 130 MM HG: CPT | Performed by: INTERNAL MEDICINE

## 2023-12-06 PROCEDURE — 3008F BODY MASS INDEX DOCD: CPT | Performed by: INTERNAL MEDICINE

## 2023-12-06 PROCEDURE — 99213 OFFICE O/P EST LOW 20 MIN: CPT | Performed by: INTERNAL MEDICINE

## 2023-12-06 PROCEDURE — 4010F ACE/ARB THERAPY RXD/TAKEN: CPT | Performed by: INTERNAL MEDICINE

## 2023-12-06 PROCEDURE — 3078F DIAST BP <80 MM HG: CPT | Performed by: INTERNAL MEDICINE

## 2023-12-06 PROCEDURE — 3052F HG A1C>EQUAL 8.0%<EQUAL 9.0%: CPT | Performed by: INTERNAL MEDICINE

## 2023-12-06 RX ORDER — AMIODARONE HYDROCHLORIDE 200 MG/1
200 TABLET ORAL DAILY
Qty: 90 TABLET | Refills: 1 | Status: SHIPPED | OUTPATIENT
Start: 2023-12-06 | End: 2024-02-26 | Stop reason: ALTCHOICE

## 2023-12-06 NOTE — PROGRESS NOTES
"Subjective   Patient ID: Sebastian Kenney \"Tim\" is a 58 y.o. male who presents for Sleep Apnea.  HPI  Patient was seen today on a 4-month follow-up visit.  He is on CPAP therapy for his obstructive sleep apnea.  He reports that the machine and mask have generally been working well.  However he has noticed that over the last few weeks has had to tighten up the straps on his nasal mask.  He is using nasal pillows and he apparently has not replaced any of his supplies since June when he was placed on therapy.  I suggested that he contact [employer]  Which is his [employer] company about getting the supplies that he should have.  His download over the last 90 days from 12/5/2023 shows 99% usage over that period of time with an average daily use of 9 hoursThat he should have.  He is currently on 15/5 cm of water pressure on his APAP device.  His AHI is 1.0 and his maximum pressure is 10.7.  Review of Systems  Patient reports that he recently had quadruple bypass heart surgery in October.  He was also septic and in the hospital for that.  Objective   Physical Exam  Physical exam was not done today.  Assessment/Plan        Impressions:  1.  Obstructive sleep apnea syndrome.  2.  APAP therapy at 15/5 cm of water.  Recommendations:  1.  Obtain new supplies from his DME provider.  I did contact [employer] today and they are aware of him not picking up any supplies.  2.  Follow-up with the patient in 6 months.      This note was transcribed using the Dragon Dictation system.  There may be grammatical, punctuation, or verbiage errors that occur with voice recognition programs.    Car Brewster,  12/06/23 4:03 PM   "

## 2023-12-12 ENCOUNTER — NURSE ONLY (OUTPATIENT)
Dept: CARDIOLOGY | Facility: HOSPITAL | Age: 58
End: 2023-12-12
Payer: COMMERCIAL

## 2023-12-12 NOTE — RESEARCH NOTES
Informed Consent Documentation -  Tool  Protocol Title: [facility] # [id number]  : Wilman Amador MD    Participant ID:   [id number]     Visit Date:  10/06/2023     Date of first contact with participant regarding study:   10/06/2023     Informed Consent (ICF)/HIPAA Obtained by: (must be CITI certified)  Stacey Dinero RN   Date Signed:  10/06/2023   Time Signed:  1242   Individuals present during the informed consent process:  Stacey Marc     Did participant meet inclusion/exclusion criteria:  [x] YES  [] NO  [] PENDING     Date and Time Research activities began:   10/06/2023 1243     Did the participant sig and date the ICF before any study procedures were performed  [x] YES  [] NO    Was the participant given adequate time to review the ICF and ask questions?  [x] YES  [] NO    Did the participant verbalize an understanding of the main purpose of study, (procedures, follow-up, risk etc.)  [x] YES  [] NO    Were the participants's questions answered to his/her satisfaction?  [x] YES  [] NO    Were other involved in the decision making?  [] YES  If Yes, who?      [x] NO    Was the participant given a current, stamped copy of the ICF?  [x] YES  [] NO  If Yes, please list the version and expiration date below:  Version   April 19, 2023   Expiration Date  December 6, 2023     ADDITIONAL DOCUMENTATION     The participant was entered on the Enrollment Log.  [x] YES  [] NO    A copy of the consent form is filed in the  medical record.  [x] YES  [] NO    The current [facility] IRB consent template version is being used and appropriate signature blocks are present (I.e., LAR, next of kin , one parent, two parents, etc.)  [x] YES  [] NO    FORM COMPLETED BY: STACEY DINERO    DATE:   December 12, 2023

## 2023-12-19 PROCEDURE — G0180 MD CERTIFICATION HHA PATIENT: HCPCS | Performed by: NURSE PRACTITIONER

## 2023-12-21 ENCOUNTER — PATIENT OUTREACH (OUTPATIENT)
Dept: CARDIOLOGY | Facility: CLINIC | Age: 58
End: 2023-12-21
Payer: COMMERCIAL

## 2023-12-21 NOTE — PROGRESS NOTES
90 day call  This Cm called and spoke with patient to address any final questions or concerns regarding hospitalization. Patient reports doing well and has no further concerns

## 2023-12-29 DIAGNOSIS — Z95.1 S/P CABG X 4: ICD-10-CM

## 2023-12-29 RX ORDER — ISOSORBIDE MONONITRATE 30 MG/1
30 TABLET, EXTENDED RELEASE ORAL DAILY
Qty: 1 TABLET | Refills: 0 | Status: SHIPPED | OUTPATIENT
Start: 2023-12-29

## 2024-01-17 NOTE — PROGRESS NOTES
Subjective:      Patient ID: Shira Damon is a 65 y.o. male who presents today for:  Chief Complaint   Patient presents with    New Patient     Rodrick Castillo f/u - septic shock - foot ulcer        Patient here for follow-up was in the hospital . He had septic shock this was thought to be secondary to a diabetic foot infection with significant toxicity relatively rapidly improved. He was discharged on Augmentin he finished his course several days ago his wound is stable he is using special shoes to offload pressure he is following with podiatry he had no fevers chills or night sweats he has some occasional thigh pain with just touching but no lesions. Past Medical History:   Diagnosis Date    Erectile dysfunction     Focal nodular hyperplasia of liver 2/13/2013    History of cardiovascular stress test 2007    History of Doppler ultrasound     of liver showed hyperplasia. Hyperlipidemia     Hypertension     Low testosterone     Type II or unspecified type diabetes mellitus without mention of complication, not stated as uncontrolled 2005     Past Surgical History:   Procedure Laterality Date    CARDIAC CATHETERIZATION  2007    100% occlusion of first lateral branch of circumflex. showing 100% distal occlusion of small blood vessel: mild disease. Allergies   Allergen Reactions    Dye [Barium-Containing Compounds] Hives         Review of Systems  See HPI    Objective:   /80   Pulse 91   Temp 98.1 °F (36.7 °C)   Wt 266 lb (120.7 kg)   BMI 34.15 kg/m²     General: Patient appears ok at the present time.  NAD  Skin: no new rashes, right foot dorsum ulcer mid to forefoot full-thickness loss clean-based granulation tissue or drainage,  HEENT:  Neck is supple, No subconjunctival hemorrhages, no oral exudates  Heart: S1 S2  Lungs: clear bilaterally   Abdomen: soft, ND, NTTP,   Back :no CVA tenderness  Extrem: No edema, non tender right thigh has some superficial tenderness to light touch but no active skin
[Follow-Up] : a follow-up evaluation of

## 2024-02-02 ENCOUNTER — OFFICE VISIT (OUTPATIENT)
Dept: ENDOCRINOLOGY | Age: 59
End: 2024-02-02
Payer: COMMERCIAL

## 2024-02-02 VITALS
OXYGEN SATURATION: 93 % | SYSTOLIC BLOOD PRESSURE: 125 MMHG | HEIGHT: 74 IN | DIASTOLIC BLOOD PRESSURE: 75 MMHG | HEART RATE: 69 BPM | BODY MASS INDEX: 35.29 KG/M2 | WEIGHT: 275 LBS

## 2024-02-02 DIAGNOSIS — M10.00 ACUTE IDIOPATHIC GOUT, UNSPECIFIED SITE: ICD-10-CM

## 2024-02-02 DIAGNOSIS — E29.1 HYPOGONADISM IN MALE: Primary | ICD-10-CM

## 2024-02-02 DIAGNOSIS — Z46.81 INSULIN PUMP TITRATION: ICD-10-CM

## 2024-02-02 DIAGNOSIS — E11.42 DIABETIC POLYNEUROPATHY ASSOCIATED WITH TYPE 2 DIABETES MELLITUS (HCC): ICD-10-CM

## 2024-02-02 LAB
CHP ED QC CHECK: NORMAL
GLUCOSE BLD-MCNC: 151 MG/DL
HBA1C MFR BLD: 9.1 %

## 2024-02-02 PROCEDURE — G8427 DOCREV CUR MEDS BY ELIG CLIN: HCPCS | Performed by: INTERNAL MEDICINE

## 2024-02-02 PROCEDURE — 1036F TOBACCO NON-USER: CPT | Performed by: INTERNAL MEDICINE

## 2024-02-02 PROCEDURE — G8417 CALC BMI ABV UP PARAM F/U: HCPCS | Performed by: INTERNAL MEDICINE

## 2024-02-02 PROCEDURE — 2022F DILAT RTA XM EVC RTNOPTHY: CPT | Performed by: INTERNAL MEDICINE

## 2024-02-02 PROCEDURE — 82962 GLUCOSE BLOOD TEST: CPT | Performed by: INTERNAL MEDICINE

## 2024-02-02 PROCEDURE — 99214 OFFICE O/P EST MOD 30 MIN: CPT | Performed by: INTERNAL MEDICINE

## 2024-02-02 PROCEDURE — 83036 HEMOGLOBIN GLYCOSYLATED A1C: CPT | Performed by: INTERNAL MEDICINE

## 2024-02-02 PROCEDURE — 3046F HEMOGLOBIN A1C LEVEL >9.0%: CPT | Performed by: INTERNAL MEDICINE

## 2024-02-02 PROCEDURE — 3078F DIAST BP <80 MM HG: CPT | Performed by: INTERNAL MEDICINE

## 2024-02-02 PROCEDURE — G8484 FLU IMMUNIZE NO ADMIN: HCPCS | Performed by: INTERNAL MEDICINE

## 2024-02-02 PROCEDURE — 3017F COLORECTAL CA SCREEN DOC REV: CPT | Performed by: INTERNAL MEDICINE

## 2024-02-02 PROCEDURE — 3074F SYST BP LT 130 MM HG: CPT | Performed by: INTERNAL MEDICINE

## 2024-02-02 RX ORDER — ALLOPURINOL 300 MG/1
TABLET ORAL
Qty: 90 TABLET | Refills: 3 | Status: SHIPPED | OUTPATIENT
Start: 2024-02-02

## 2024-02-02 RX ORDER — ORAL SEMAGLUTIDE 14 MG/1
TABLET ORAL
Qty: 90 TABLET | Refills: 3 | Status: SHIPPED | OUTPATIENT
Start: 2024-02-02

## 2024-02-02 RX ORDER — SILDENAFIL 100 MG/1
100 TABLET, FILM COATED ORAL PRN
Qty: 30 TABLET | Refills: 3 | Status: SHIPPED | OUTPATIENT
Start: 2024-02-02

## 2024-02-02 ASSESSMENT — ENCOUNTER SYMPTOMS: EYES NEGATIVE: 1

## 2024-02-02 NOTE — PROGRESS NOTES
2/2/2024    Assessment:       Diagnosis Orders   1. Hypogonadism in male        2. Diabetic polyneuropathy associated with type 2 diabetes mellitus (HCC)  POCT Glucose    POCT glycosylated hemoglobin (Hb A1C)      3. Insulin pump titration        4. Acute idiopathic gout, unspecified site              PLAN:     Increase basal rate to 4 units/h  Continue other settings on insulin pump  Continue testosterone  Continue allopurinol  Increase dose of Rybelsus  More than 50% of 30 minutes spent in patient education counseling  Orders Placed This Encounter   Procedures    Basic Metabolic Panel     Standing Status:   Future     Standing Expiration Date:   2/2/2025    Testosterone, free, total     Standing Status:   Future     Standing Expiration Date:   2/2/2025    Hemoglobin A1C     Standing Status:   Future     Standing Expiration Date:   2/2/2025    POCT Glucose    POCT glycosylated hemoglobin (Hb A1C)     Orders Placed This Encounter   Medications    allopurinol (ZYLOPRIM) 300 MG tablet     Sig: TAKE 1 TABLET BY MOUTH EVERY DAY     Dispense:  90 tablet     Refill:  3    Semaglutide (RYBELSUS) 14 MG TABS     Sig: Once a day     Dispense:  90 tablet     Refill:  3    sildenafil (VIAGRA) 100 MG tablet     Sig: Take 1 tablet by mouth as needed for Erectile Dysfunction     Dispense:  30 tablet     Refill:  3     Diabetes education provided today:    Nutrition as a mainstream of diabetes therapy. [place] about label reading.  Insulin pumps, how they work and how they affect blood sugar levels.  Continuous Glucose monitor. How it works and checks blood sugars every 5 min. for 4 days during our tests.  Managing high and low sugar readings.          Orders Placed This Encounter   Procedures    POCT Glucose    POCT glycosylated hemoglobin (Hb A1C)     No orders of the defined types were placed in this encounter.    No follow-ups on file.  Subjective:     Chief Complaint   Patient presents with    Hypogonadism    Diabetes

## 2024-02-13 ENCOUNTER — TELEPHONE (OUTPATIENT)
Dept: ENDOCRINOLOGY | Age: 59
End: 2024-02-13

## 2024-02-14 DIAGNOSIS — G62.9 POLYNEUROPATHY, UNSPECIFIED: ICD-10-CM

## 2024-02-14 NOTE — TELEPHONE ENCOUNTER
Rx Refill Request Telephone Encounter    Name:  Sebastian Kenney  :  [id number]  Medication Name:  Gabapentin 600MG   Specific Pharmacy location:  [facility] pharmacy   Date of last appointment:  10/24/23  Date of next appointment:  24  Best number to reach patient:  [phone]

## 2024-02-15 RX ORDER — GABAPENTIN 600 MG/1
600 TABLET ORAL
Qty: 150 TABLET | Refills: 2 | Status: SHIPPED | OUTPATIENT
Start: 2024-02-15 | End: 2024-06-05 | Stop reason: SDUPTHER

## 2024-02-20 NOTE — PROGRESS NOTES
"Subjective   Patient ID: Sebastian Kenney \"Tim\" is a 58 y.o. male who presents for Coronary Artery Disease, Hypertension, Hyperlipidemia, and Diabetes.  HPI    Runny nose that started yesterday  Some head cold and sinus  Family members have been ill  He declines testing would like medicine  He would like medicine treatment he will be traveling to [facility] soon to ski    Coronary disease stable no angina    Hypertension stable no chest pain or shortness of breath    High cholesterol stable watch diet    Obese recommend weight loss and diet    Diabetes with fair control  Keep endocrinology follow-up    Gout stable no progression or worsening    Low testosterone recommend follow blood work    Due for prostate cancer screening  Current with colon cancer screening      Review of Systems   Constitutional:  Negative for activity change and fatigue.   HENT:  Negative for congestion and sore throat.    Eyes:  Negative for discharge.   Respiratory:  Negative for cough, chest tightness and shortness of breath.    Cardiovascular:  Negative for chest pain and leg swelling.   Gastrointestinal:  Negative for abdominal pain, blood in stool, constipation, diarrhea, nausea and vomiting.   Endocrine: Negative for cold intolerance and heat intolerance.   Genitourinary:  Negative for difficulty urinating and hematuria.   Musculoskeletal:  Negative for arthralgias, back pain, gait problem, myalgias and neck pain.   Allergic/Immunologic: Negative for environmental allergies.   Neurological:  Negative for dizziness, syncope, weakness, numbness and headaches.   Hematological:  Negative for adenopathy. Does not bruise/bleed easily.   Psychiatric/Behavioral:  Negative for dysphoric mood. The patient is not nervous/anxious.    All other systems reviewed and are negative.      Objective   /75 (BP Location: Left arm, BP Cuff Size: Large adult)   Pulse 79   Ht 1.88 m (6' 2\")   Wt 126 kg (277 lb 6.4 oz)   SpO2 98%   BMI 35.62 kg/m²  "   Physical Exam  Vitals and nursing note reviewed.   Constitutional:       General: He is not in acute distress.     Appearance: Normal appearance. He is obese.   HENT:      Head: Normocephalic and atraumatic.      Right Ear: Tympanic membrane, ear canal and external ear normal.      Left Ear: Tympanic membrane, ear canal and external ear normal.      Nose: Nose normal.      Mouth/Throat:      Mouth: Mucous membranes are moist.      Pharynx: Oropharynx is clear. No oropharyngeal exudate or posterior oropharyngeal erythema.   Eyes:      Extraocular Movements: Extraocular movements intact.      Conjunctiva/sclera: Conjunctivae normal.      Pupils: Pupils are equal, round, and reactive to light.   Cardiovascular:      Rate and Rhythm: Normal rate and regular rhythm.      Pulses: Normal pulses.      Heart sounds: Normal heart sounds. No murmur heard.  Pulmonary:      Effort: Pulmonary effort is normal. No respiratory distress.      Breath sounds: Normal breath sounds. No wheezing or rales.   Abdominal:      General: Abdomen is flat. Bowel sounds are normal. There is no distension.      Palpations: Abdomen is soft. There is no mass.      Tenderness: There is no abdominal tenderness.   Musculoskeletal:         General: No swelling or deformity. Normal range of motion.      Cervical back: Normal range of motion and neck supple.      Right lower leg: No edema.      Left lower leg: No edema.   Lymphadenopathy:      Cervical: No cervical adenopathy.   Skin:     General: Skin is warm and dry.      Capillary Refill: Capillary refill takes less than 2 seconds.      Findings: No lesion or rash.   Neurological:      General: No focal deficit present.      Mental Status: He is alert and oriented to person, place, and time.      Cranial Nerves: No cranial nerve deficit.      Motor: No weakness.   Psychiatric:         Mood and Affect: Mood normal.         Behavior: Behavior normal.         Thought Content: Thought content normal.          Judgment: Judgment normal.         Assessment/Plan   Problem List Items Addressed This Visit       Essential hypertension    Relevant Orders    CBC and Auto Differential    Comprehensive Metabolic Panel    Mixed hyperlipidemia    Relevant Orders    Lipid Panel    Idiopathic chronic gout of multiple sites without tophus    Type 2 diabetes mellitus with diabetic neuropathy, with long-term current use of insulin (CMS/[facility])    Class 2 severe obesity due to excess calories with serious comorbidity and body mass index (BMI) of 35.0 to 35.9 in adult (CMS/[facility])    Low testosterone in male    Atherosclerosis of coronary artery bypass graft of native heart with angina pectoris (CMS/[facility]) - Primary     Other Visit Diagnoses       Prostate cancer screening        Relevant Orders    Prostate Specific Antigen    Acute non-recurrent pansinusitis        Relevant Medications    azithromycin (Zithromax) 250 mg tablet            Patient education provided.  Stay current with age appropriate health maintenance as instructed.  Appointment here or ER with new or worsening symptoms'  Keep appropriate follow-up visit.  Stay current with proper immunizations   Medicine as above  Stay current with blood work  Further workup with persistent or worsening symptoms  Discussed at length with patient  3-month reevaluation

## 2024-02-26 ENCOUNTER — OFFICE VISIT (OUTPATIENT)
Dept: PRIMARY CARE | Facility: CLINIC | Age: 59
End: 2024-02-26
Payer: COMMERCIAL

## 2024-02-26 VITALS
HEART RATE: 79 BPM | HEIGHT: 74 IN | BODY MASS INDEX: 35.6 KG/M2 | SYSTOLIC BLOOD PRESSURE: 118 MMHG | OXYGEN SATURATION: 98 % | WEIGHT: 277.4 LBS | DIASTOLIC BLOOD PRESSURE: 75 MMHG

## 2024-02-26 DIAGNOSIS — Z12.5 PROSTATE CANCER SCREENING: ICD-10-CM

## 2024-02-26 DIAGNOSIS — R79.89 LOW TESTOSTERONE IN MALE: ICD-10-CM

## 2024-02-26 DIAGNOSIS — I10 ESSENTIAL HYPERTENSION: ICD-10-CM

## 2024-02-26 DIAGNOSIS — I25.709 ATHEROSCLEROSIS OF CORONARY ARTERY BYPASS GRAFT OF NATIVE HEART WITH ANGINA PECTORIS (CMS-HCC): Primary | ICD-10-CM

## 2024-02-26 DIAGNOSIS — E66.01 CLASS 2 SEVERE OBESITY DUE TO EXCESS CALORIES WITH SERIOUS COMORBIDITY AND BODY MASS INDEX (BMI) OF 35.0 TO 35.9 IN ADULT (MULTI): ICD-10-CM

## 2024-02-26 DIAGNOSIS — E78.2 MIXED HYPERLIPIDEMIA: ICD-10-CM

## 2024-02-26 DIAGNOSIS — J01.40 ACUTE NON-RECURRENT PANSINUSITIS: ICD-10-CM

## 2024-02-26 DIAGNOSIS — M1A.09X0 IDIOPATHIC CHRONIC GOUT OF MULTIPLE SITES WITHOUT TOPHUS: ICD-10-CM

## 2024-02-26 DIAGNOSIS — E11.40 TYPE 2 DIABETES MELLITUS WITH DIABETIC NEUROPATHY, WITH LONG-TERM CURRENT USE OF INSULIN (MULTI): ICD-10-CM

## 2024-02-26 DIAGNOSIS — Z79.4 TYPE 2 DIABETES MELLITUS WITH DIABETIC NEUROPATHY, WITH LONG-TERM CURRENT USE OF INSULIN (MULTI): ICD-10-CM

## 2024-02-26 PROBLEM — E66.812 CLASS 2 SEVERE OBESITY DUE TO EXCESS CALORIES WITH SERIOUS COMORBIDITY AND BODY MASS INDEX (BMI) OF 35.0 TO 35.9 IN ADULT: Status: ACTIVE | Noted: 2023-02-12

## 2024-02-26 PROCEDURE — 3008F BODY MASS INDEX DOCD: CPT | Performed by: FAMILY MEDICINE

## 2024-02-26 PROCEDURE — 4010F ACE/ARB THERAPY RXD/TAKEN: CPT | Performed by: FAMILY MEDICINE

## 2024-02-26 PROCEDURE — 99214 OFFICE O/P EST MOD 30 MIN: CPT | Performed by: FAMILY MEDICINE

## 2024-02-26 PROCEDURE — 3078F DIAST BP <80 MM HG: CPT | Performed by: FAMILY MEDICINE

## 2024-02-26 PROCEDURE — 1036F TOBACCO NON-USER: CPT | Performed by: FAMILY MEDICINE

## 2024-02-26 PROCEDURE — 3074F SYST BP LT 130 MM HG: CPT | Performed by: FAMILY MEDICINE

## 2024-02-26 RX ORDER — AZITHROMYCIN 250 MG/1
TABLET, FILM COATED ORAL
Qty: 6 TABLET | Refills: 0 | Status: SHIPPED | OUTPATIENT
Start: 2024-02-26 | End: 2024-03-01

## 2024-02-26 ASSESSMENT — ENCOUNTER SYMPTOMS
ADENOPATHY: 0
BLOOD IN STOOL: 0
DYSPHORIC MOOD: 0
EYE DISCHARGE: 0
VOMITING: 0
NAUSEA: 0
CONSTIPATION: 0
HEMATURIA: 0
ARTHRALGIAS: 0
FATIGUE: 0
NUMBNESS: 0
BACK PAIN: 0
NECK PAIN: 0
COUGH: 0
HEADACHES: 0
MYALGIAS: 0
ACTIVITY CHANGE: 0
DIARRHEA: 0
DIFFICULTY URINATING: 0
CHEST TIGHTNESS: 0
WEAKNESS: 0
ABDOMINAL PAIN: 0
SORE THROAT: 0
NERVOUS/ANXIOUS: 0
BRUISES/BLEEDS EASILY: 0
DIZZINESS: 0
SHORTNESS OF BREATH: 0

## 2024-03-06 NOTE — CONSULTS
Service:   Service: Cardiac Surgery     Consult:  Consult requested by (Attending Name): Yoni Phillips   Reason: Multivessel CAD/LVEF45%/Dr. 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 PAD testing unremarkable.  Over the last month, he has begun to notice some exertional shortness of breath and has had several  episodes of chest pain related to activity.  No associated symptoms of nausea or diaphoresis.  Presented today for elective left heart catheterization to further evaluate these symptoms.  Found to have severe multivessel coronary artery disease with 100%  stenosis of the LAD in its mid segment with collateralization via the PDA, 75% stenosis of the first diagonal, 95% stenosis of the mid circumflex, 50% stenosis of the RCA and 90% stenosis of the distal PDA.  Left ventriculography showing mildly depressed  LV systolic function with left-ventricular ejection fraction of 45%.  Surgery asked to the patient for goal revascularization.    At time of consultation, the patient is in no distress, he is afebrile, normotensive, maintaining sinus rhythm with adequate saturations on room air.  No reports of exertional or resting anginal symptoms.  Discussed with cardiology, patient is stable  to be discharged and return electively for procedure.  Patient has been scheduled to see Dr. Lianne Portillo in clinic this week, preoperative testing to be completed prior to discharge.    Review Family/Social History and ROS:     Review Family/Social History and ROS:       I have reviewed the family and social history and review of systems from the History and  Physical.    Social History:    Smoking Status: never smoker   Alcohol Use: occasionally   Drug Use: denies   Occupation: Director     Constitutional: NEGATIVE: Fever, Chills, Anorexia,  Weight Loss, Malaise     Eyes: NEGATIVE: Blurry Vision, Drainage, Diploplia,  Redness, Vision Loss/ Change     ENMT: NEGATIVE: Nasal Discharge, Nasal Congestion,  Ear Pain, Mouth Pain, Throat Pain     Respiratory: NEGATIVE: Dry Cough, Productive Cough,  Hemoptysis, Wheezing, Shortness of Breath     Cardiac: POSITIVE: Chest Pain, Dyspnea on Exertion ; NEGATIVE: Orthopnea, Palpitations, Syncope     Gastrointestinal: NEGATIVE: Nausea, Vomiting, Diarrhea,  Constipation, Abdominal Pain     Genitourinary: NEGATIVE: Discharge, Dysuria, Flank  Pain, Frequency, Hematuria     Musculoskeletal: NEGATIVE: Decreased ROM, Pain, Swelling,  Stiffness, Weakness     Neurological: NEGATIVE: Dizziness, Confusion, Headache,  Seizures, Syncope     Psychiatric: NEGATIVE: Mood Changes, Anxiety, Hallucinations,  Sleep Changes, Suicidal Ideas     Skin: NEGATIVE: Mass, Pain, Pruritus, Rash, Ulcer     Endocrine: NEGATIVE: Heat Intolerance, Cold Intolerance,  Sweat, Polyuria, Thirst     Hematologic/Lymph: NEGATIVE: Anemia, Bruising, Easy  Bleeding, Night Sweats, Petechiae     Allergic/Immunologic: NEGATIVE: Anaphylaxis, Itchy/  Teary Eyes, Itching, Sneezing, Swelling     Breast: NEGATIVE: Pain, Mass, Discharge, Nipple Itching,  Gynecomastia     All Other Systems: All other systems reviewed and  are negative            Allergies:  ·  NKDA :   ·  contrast  (specific type unknown): Hives/Urticaria    Objective:   Physical Exam by System:    Constitutional: Well developed, awake/alert/oriented  x3, no distress, alert and cooperative   Eyes: PERRL, EOMI, clear sclera   ENMT: mucous membranes moist, no apparent injury,  no lesions seen   Head/Neck: Neck supple, no apparent injury, thyroid  without mass or tenderness, No JVD, trachea midline, no bruits    Respiratory/Thorax: Patent airways, CTAB, normal  breath sounds with good chest expansion, thorax symmetric   Cardiovascular: Regular, rate and rhythm, no murmurs,  2+ equal pulses of the extremities, normal S 1and S 2   Gastrointestinal: Nondistended, soft, non-tender,  no rebound tenderness or guarding, no masses palpable, no organomegaly, +BS, no bruits   Genitourinary: Deferred   Musculoskeletal: ROM intact, no joint swelling, normal  strength   Extremities: normal extremities, no cyanosis edema,  contusions or wounds, no clubbing   Neurological: alert and oriented x3, intact senses,  motor, response and reflexes, normal strength   Breast: deferred   Lymphatic: No significant lymphadenopathy   Psychological: Appropriate mood and behavior   Skin: Warm and dry, no lesions, no rashes     Medications:    Medications:          Continuous Medications       --------------------------------    1. Sodium Chloride 0.9% Infusion:  1000  mL  IntraVenous  <Continuous>         Scheduled Medications       --------------------------------  No scheduled medications are active         PRN Medications       --------------------------------  No PRN medications are active         Conditional Medication Orders       --------------------------------    1. Perflutren Lipid Microsphere (Activated) 1.3 mL / NaCL 0.9% T.V. 10 mL Injectable:  0.5  mL  IntraVenous Push  Once      Recent Lab Results:    Results:        I have reviewed these laboratory results:    Complete Blood Count  25-Sep-2023 06:43:00      Result Value    White Blood Cell Count  7.5    Red Blood Cell Count  4.50    HGB  13.7    HCT  38.7   L   MCV  86    MCHC  35.4    PLT  165    RDW-CV  12.7      Basic Metabolic Panel  25-Sep-2023 06:43:00      Result Value    Glucose, Serum  323   H   NA  134   L   K  4.0    CL  101    Bicarbonate, Serum  23    Anion Gap, Serum  14    BUN  23    CREAT  0.91    GFR Male  >90    Calcium, Serum  9.6        Radiology Results:    Results:         Conclusion:  CONCLUSIONS:  1. The entire Left Main: <10% stenosis.  2. Left Anterior Descending Artery: fills via collaterals.  3. Mid LAD Lesion: The percent stenosis is 100%.  4. Mid LAD Lesion: Instent Restenosis.  5. 1st Diag LAD Lesion: The percent stenosis is 75%.  6. Mid CX Lesion: The percent stenosis is 95%.  7. Mid RCA Lesion: The percent stenosis is 50%.  8. Distal PDA RCA Lesion: The percent stenosis is 90%.  9. The Left Ventricular Ejection Fraction is 45-50%.    ____________________________________________________________________________________  CPT Codes:  Left Heart Cath (visualization of coronaries) and LV-80691; Moderate Sedation Services initial 15 minutes patient >5 years-06087    ICD 10 Codes:  I25.119-Atherosclerotic heart disease of native coronary artery with unspecified angina pectoris    [id number] Yoni Hernandez MD  Performing Physician  Electronically signed by [id number] Yoni Hernandez MD on 9/25/2023 at 8:44:20  AM      cc Report to: [id number] Gonzalez Cano MD    cc Report to: [id number] Yoni Hernandez MD          *** Final ***     Cardiac Catheterization Lab Procedures [Sep 25 2023  8:44AM]        Consult Status:  Consult Order ID: [id number]     Problem/Assessment/Plan:    Impression 1: CAD; stable angina   Plan for Impression 1: Underwent elective left heart  catheterization to evaluate symptoms of progressive exertional angina.  Found to have significant multivessel disease best suited for surgical revascularization.  STS operative risk 0.5% overall STS morbidity and mortality risk 3.8%.  -Stable to be discharged from cardiology standpoint.  Obtain routine preoperative testing including echocardiogram, carotid duplex, vein mapping, noncontrast CT of chest, hemoglobin A1c, LFTs, urinalysis and MRSA screen  -Continue daily low-dose aspirin, statin and metoprolol  -Follow-up with Dr. Lianne Portillo this Thursday in clinic to determine timing of procedure       Electronic  Signatures:  Cas Orellana (APRN-CNP)  (Signed 25-Sep-2023 11:38)   Authored: Service, History of Present Illness, Review  Family/Social History and ROS, Allergies, Objective, Assessment/Recommendations, Note Completion      Last Updated: 25-Sep-2023 11:38 by Cas Orellana (APRN-CNP)

## 2024-03-22 ENCOUNTER — DOCUMENTATION (OUTPATIENT)
Dept: CARDIOLOGY | Facility: HOSPITAL | Age: 59
End: 2024-03-22
Payer: COMMERCIAL

## 2024-03-22 NOTE — PROGRESS NOTES
Connected with patient via phone for LeAAPs Trial 6 month follow up.  Visit completed and contact information confirmed.  Plan for next contact in 6 months.

## 2024-06-05 ENCOUNTER — PATIENT MESSAGE (OUTPATIENT)
Dept: PRIMARY CARE | Facility: CLINIC | Age: 59
End: 2024-06-05
Payer: COMMERCIAL

## 2024-06-05 DIAGNOSIS — G62.9 POLYNEUROPATHY, UNSPECIFIED: ICD-10-CM

## 2024-06-05 RX ORDER — GABAPENTIN 600 MG/1
600 TABLET ORAL
Qty: 150 TABLET | Refills: 2 | Status: SHIPPED | OUTPATIENT
Start: 2024-06-05

## 2024-06-05 NOTE — TELEPHONE ENCOUNTER
From: Sebastian Kenney  To: Gonzalez Cano  Sent: 6/5/2024 12:03 PM EDT  Subject: Gabapentin    I was told by [facility] pharmacy that I was out of refills for my Gabapentin prescription. Can you please call in a refill for this prescription? Thanks

## 2024-06-06 ENCOUNTER — APPOINTMENT (OUTPATIENT)
Dept: PULMONOLOGY | Facility: CLINIC | Age: 59
End: 2024-06-06
Payer: COMMERCIAL

## 2024-06-10 ENCOUNTER — OFFICE VISIT (OUTPATIENT)
Dept: ENDOCRINOLOGY | Age: 59
End: 2024-06-10
Payer: COMMERCIAL

## 2024-06-10 VITALS
OXYGEN SATURATION: 95 % | HEART RATE: 84 BPM | DIASTOLIC BLOOD PRESSURE: 69 MMHG | HEIGHT: 74 IN | WEIGHT: 276 LBS | BODY MASS INDEX: 35.42 KG/M2 | SYSTOLIC BLOOD PRESSURE: 123 MMHG

## 2024-06-10 DIAGNOSIS — E29.1 HYPOGONADISM IN MALE: Primary | ICD-10-CM

## 2024-06-10 DIAGNOSIS — E11.42 DIABETIC POLYNEUROPATHY ASSOCIATED WITH TYPE 2 DIABETES MELLITUS (HCC): ICD-10-CM

## 2024-06-10 DIAGNOSIS — Z46.81 INSULIN PUMP TITRATION: ICD-10-CM

## 2024-06-10 DIAGNOSIS — M10.00 ACUTE IDIOPATHIC GOUT, UNSPECIFIED SITE: ICD-10-CM

## 2024-06-10 LAB
CHP ED QC CHECK: NORMAL
GLUCOSE BLD-MCNC: 166 MG/DL
HBA1C MFR BLD: 7.4 %

## 2024-06-10 PROCEDURE — G8417 CALC BMI ABV UP PARAM F/U: HCPCS | Performed by: INTERNAL MEDICINE

## 2024-06-10 PROCEDURE — 3078F DIAST BP <80 MM HG: CPT | Performed by: INTERNAL MEDICINE

## 2024-06-10 PROCEDURE — G8427 DOCREV CUR MEDS BY ELIG CLIN: HCPCS | Performed by: INTERNAL MEDICINE

## 2024-06-10 PROCEDURE — 3074F SYST BP LT 130 MM HG: CPT | Performed by: INTERNAL MEDICINE

## 2024-06-10 PROCEDURE — 82962 GLUCOSE BLOOD TEST: CPT | Performed by: INTERNAL MEDICINE

## 2024-06-10 PROCEDURE — 3051F HG A1C>EQUAL 7.0%<8.0%: CPT | Performed by: INTERNAL MEDICINE

## 2024-06-10 PROCEDURE — 3017F COLORECTAL CA SCREEN DOC REV: CPT | Performed by: INTERNAL MEDICINE

## 2024-06-10 PROCEDURE — 99214 OFFICE O/P EST MOD 30 MIN: CPT | Performed by: INTERNAL MEDICINE

## 2024-06-10 PROCEDURE — 1036F TOBACCO NON-USER: CPT | Performed by: INTERNAL MEDICINE

## 2024-06-10 PROCEDURE — 2022F DILAT RTA XM EVC RTNOPTHY: CPT | Performed by: INTERNAL MEDICINE

## 2024-06-10 PROCEDURE — 83036 HEMOGLOBIN GLYCOSYLATED A1C: CPT | Performed by: INTERNAL MEDICINE

## 2024-06-10 RX ORDER — INSULIN ASPART 100 [IU]/ML
INJECTION, SOLUTION INTRAVENOUS; SUBCUTANEOUS
Qty: 180 ML | Refills: 3 | Status: SHIPPED | OUTPATIENT
Start: 2024-06-10

## 2024-06-10 RX ORDER — TESTOSTERONE GEL, 1% 10 MG/G
2 GEL TRANSDERMAL DAILY
Qty: 180 EACH | Refills: 3 | Status: SHIPPED | OUTPATIENT
Start: 2024-06-10 | End: 2024-09-08

## 2024-06-10 RX ORDER — ORAL SEMAGLUTIDE 14 MG/1
TABLET ORAL
Qty: 90 TABLET | Refills: 3 | Status: SHIPPED | OUTPATIENT
Start: 2024-06-10

## 2024-06-10 RX ORDER — ALLOPURINOL 300 MG/1
TABLET ORAL
Qty: 90 TABLET | Refills: 3 | Status: SHIPPED | OUTPATIENT
Start: 2024-06-10

## 2024-06-10 RX ORDER — SILDENAFIL 100 MG/1
100 TABLET, FILM COATED ORAL PRN
Qty: 30 TABLET | Refills: 3 | Status: SHIPPED | OUTPATIENT
Start: 2024-06-10

## 2024-06-10 ASSESSMENT — ENCOUNTER SYMPTOMS: EYES NEGATIVE: 1

## 2024-06-10 NOTE — PROGRESS NOTES
34 (L) 10/07/2015    CHOL 147 09/20/2022    CHOL 151 03/09/2016    CHOL 157 10/07/2015    TRIG 427 (H) 09/20/2022    TRIG 324 (H) 03/09/2016    TRIG 273 (H) 10/07/2015     Lab Results   Component Value Date    TESTM 137 (L) 08/26/2019     Lab Results   Component Value Date    TSH 0.83 04/15/2023     No results found for: \"TPOABS\"    Review of Systems   Eyes: Negative.    Cardiovascular: Negative.    Endocrine: Negative for polydipsia and polyuria.   Genitourinary:  Positive for impotence.   Musculoskeletal: Negative.    All other systems reviewed and are negative.      Objective:   Physical Exam  Vitals reviewed.   Constitutional:       General: He is not in acute distress.     Appearance: Normal appearance. He is obese.   HENT:      Head: Normocephalic and atraumatic.      Right Ear: External ear normal.      Left Ear: External ear normal.      Nose: Nose normal.   Eyes:      General: No scleral icterus.        Right eye: No discharge.         Left eye: No discharge.      Extraocular Movements: Extraocular movements intact.      Conjunctiva/sclera: Conjunctivae normal.   Cardiovascular:      Rate and Rhythm: Normal rate.   Pulmonary:      Effort: Pulmonary effort is normal.   Musculoskeletal:         General: Normal range of motion.      Cervical back: Normal range of motion and neck supple.   Neurological:      General: No focal deficit present.      Mental Status: He is alert and oriented to person, place, and time.   Psychiatric:         Mood and Affect: Mood normal.         Behavior: Behavior normal.

## 2024-06-28 ENCOUNTER — TELEPHONE (OUTPATIENT)
Dept: ENDOCRINOLOGY | Age: 59
End: 2024-06-28

## 2024-07-16 DIAGNOSIS — I10 ESSENTIAL (PRIMARY) HYPERTENSION: ICD-10-CM

## 2024-07-17 ENCOUNTER — APPOINTMENT (OUTPATIENT)
Dept: CARDIOLOGY | Facility: CLINIC | Age: 59
End: 2024-07-17
Payer: COMMERCIAL

## 2024-07-17 VITALS
BODY MASS INDEX: 35.55 KG/M2 | HEART RATE: 80 BPM | WEIGHT: 277 LBS | HEIGHT: 74 IN | SYSTOLIC BLOOD PRESSURE: 132 MMHG | DIASTOLIC BLOOD PRESSURE: 88 MMHG

## 2024-07-17 DIAGNOSIS — I10 ESSENTIAL HYPERTENSION: ICD-10-CM

## 2024-07-17 DIAGNOSIS — Z82.49 FAMILY HISTORY OF ISCHEMIC HEART DISEASE: ICD-10-CM

## 2024-07-17 DIAGNOSIS — M79.672 LEFT FOOT PAIN: ICD-10-CM

## 2024-07-17 DIAGNOSIS — E78.2 MIXED HYPERLIPIDEMIA: ICD-10-CM

## 2024-07-17 DIAGNOSIS — I25.709 ATHEROSCLEROSIS OF CORONARY ARTERY BYPASS GRAFT OF NATIVE HEART WITH ANGINA PECTORIS (CMS-HCC): ICD-10-CM

## 2024-07-17 DIAGNOSIS — I25.2 OLD MI (MYOCARDIAL INFARCTION): ICD-10-CM

## 2024-07-17 DIAGNOSIS — Z78.9 NEVER SMOKED CIGARETTES: ICD-10-CM

## 2024-07-17 RX ORDER — LISINOPRIL 10 MG/1
10 TABLET ORAL DAILY
Qty: 90 TABLET | Refills: 3 | Status: SHIPPED | OUTPATIENT
Start: 2024-07-17 | End: 2025-07-17

## 2024-07-17 RX ORDER — LISINOPRIL 2.5 MG/1
2.5 TABLET ORAL DAILY
Qty: 90 TABLET | Refills: 3 | Status: SHIPPED | OUTPATIENT
Start: 2024-07-17 | End: 2025-07-17

## 2024-07-17 ASSESSMENT — ENCOUNTER SYMPTOMS: WEIGHT GAIN: 1

## 2024-07-17 NOTE — PROGRESS NOTES
CARDIOLOGY OFFICE VISIT      CHIEF COMPLAINT      HISTORY OF PRESENT ILLNESS  The patient states that around midday he seems to be fatigued and tired more than he would like.  He is not having any ischemic chest discomfort.  He denies dyspnea exertion.  He denies palpitations and syncope.  I told him we will try to wean him off metoprolol and see if that helps his fatigue.  I told him his blood pressure will go up once he is off metoprolol and when he is off metoprolol I am to increase lisinopril to 10 mg a day.  He will monitor his blood pressure and let me know if he needs additional medication for his blood pressure.  He has not had lab work done for a while.  I told him to get that done and make sure his lipids are good.      Impression:  Coronary artery disease, no angina  CABG x4, LIMA to LAD, DINORAH free graft to OM circumflex, radial to diagonal, and SVG to PDA on 10/6/2023 at [facility]  Remote myocardial infarction  Hypertension  Hyperlipidemia  Diabetes mellitus type II  Positive family history of coronary disease  Obesity  Charcot foot, left foot           Please excuse any errors in grammar or translation related to this dictation. Voice recognition software was utilized to prepare this document.        Past Medical History  Past Medical History:   Diagnosis Date    Angina pectoris (CMS-[facility])     C. difficile colitis     dx 7/30/23    Coronary artery disease     C 7/25/23: % ISR + collat, 1st diag 75%, LCx mid 95%, RCA mid 50%, PDA distal 90%, LVEF 45%    Diabetes mellitus (Multi)     takes insulin and semaglutide/Rybelsus    Hyperlipidemia     Hypertension     Joint pain     ankle, Charcot foot    Old myocardial infarction     History of myocardial infarction    Sleep apnea     + CPAP       Social History  Social History     Tobacco Use    Smoking status: Never     Passive exposure: Never    Smokeless tobacco: Never   Substance Use Topics    Alcohol use: Yes     Comment: rare    Drug use: Never        Family History     Family History   Problem Relation Name Age of Onset    Other (Cardiac Disorder) Mother      Breast cancer Mother      Cancer Father          Allergies:  Allergies   Allergen Reactions    Iodinated Contrast Media Hives     IV Contrast Dye        Outpatient Medications:  Current Outpatient Medications   Medication Instructions    acetaminophen (TYLENOL) 650 mg, oral, Every 6 hours PRN    allopurinol (Zyloprim) 300 mg tablet Take 1 tablet by mouth daily    aspirin 81 mg EC tablet 1 tablet, oral, Daily, 81 MG    atorvastatin (LIPITOR) 80 mg, oral, Daily    clopidogrel (PLAVIX) 75 mg, oral, Daily    gabapentin (NEURONTIN) 600 mg, oral, 5 times daily    isosorbide mononitrate ER (IMDUR) 30 mg, oral, Daily    lisinopril 2.5 mg, oral, Daily    metoprolol tartrate (LOPRESSOR) 50 mg, oral, 2 times daily    multivitamin with minerals tablet 1 tablet, oral, Daily    NovoLOG U-100 Insulin aspart 100 unit/mL injection USE VIA INSULIN PUMP, MAX DAILY DOSE 200 UNITS    polyethylene glycol (GLYCOLAX, MIRALAX) 17 g, oral, Daily PRN    semaglutide (Rybelsus) 14 mg tablet tablet 1 tablet, oral, Daily, As directed    testosterone (ANDROGEL) 50 mg, transdermal, 2 times daily          REVIEW OF SYSTEMS  Review of Systems   Constitutional: Positive for weight gain.   All other systems reviewed and are negative.        VITALS  There were no vitals filed for this visit.    PHYSICAL EXAM  Constitutional:       Appearance: Healthy appearance. Not in distress.   Eyes:      Conjunctiva/sclera: Conjunctivae normal.      Pupils: Pupils are equal, round, and reactive to light.   Neck:      Vascular: No JVR. JVD normal.   Pulmonary:      Effort: Pulmonary effort is normal.      Breath sounds: Normal breath sounds. No wheezing. No rhonchi. No rales.   Chest:      Chest wall: Not tender to palpatation.   Cardiovascular:      PMI at left midclavicular line. Normal rate. Regular rhythm. Normal S1. Normal S2.       Murmurs: There  is no murmur.      No gallop.  No click. No rub.   Pulses:     Intact distal pulses.   Edema:     Peripheral edema absent.   Abdominal:      Tenderness: There is no abdominal tenderness.   Musculoskeletal: Normal range of motion.         General: No tenderness.      Cervical back: Normal range of motion. Skin:     General: Skin is warm and dry.   Neurological:      General: No focal deficit present.      Mental Status: Alert and oriented to person, place and time.           ASSESSMENT AND PLAN  Diagnoses and all orders for this visit:  Atherosclerosis of coronary artery bypass graft of native heart with angina pectoris (CMS-[facility])  Old MI (myocardial infarction)  Essential hypertension  Mixed hyperlipidemia  Family history of ischemic heart disease  Left foot pain  BMI 35.0-35.9,adult  Never smoked cigarettes      [unfilled]

## 2024-07-17 NOTE — PATIENT INSTRUCTIONS
Patient to decrease Metoprolol tartrate to 1/2 tablet twice a day for 3 days, then 1/2 tablet daily for 3 days then stop.  Once finished with Metoprolol, increase Lisinopril to 10 mg daily  STOP Aspirin    Follow up office visit in 1 year.  Continue same medications/treatment.  Patient educated on proper medication use.  Patient educated on risk factor modification.  Please bring any lab results from other providers / physicians to your next appointment.    Please bring all medicines, vitamins and herbal supplements with you when you come to the office.    Prescriptions will not be filled unless you are compliant with your follow up appointments or have a follow up  appointment scheduled as per instruction of your physician.  Refills should be requested at the time of  Your visit.    Stacey TARANGO LPN, am scribing for and in the presence of Dr. Yoni Phillips MD, FACC

## 2024-08-19 ENCOUNTER — APPOINTMENT (OUTPATIENT)
Dept: PRIMARY CARE | Facility: CLINIC | Age: 59
End: 2024-08-19
Payer: COMMERCIAL

## 2024-09-10 ENCOUNTER — OFFICE VISIT (OUTPATIENT)
Dept: ENDOCRINOLOGY | Age: 59
End: 2024-09-10
Payer: COMMERCIAL

## 2024-09-10 VITALS
DIASTOLIC BLOOD PRESSURE: 73 MMHG | SYSTOLIC BLOOD PRESSURE: 106 MMHG | BODY MASS INDEX: 36.19 KG/M2 | HEART RATE: 98 BPM | HEIGHT: 74 IN | WEIGHT: 282 LBS | OXYGEN SATURATION: 92 %

## 2024-09-10 DIAGNOSIS — E11.42 DIABETIC POLYNEUROPATHY ASSOCIATED WITH TYPE 2 DIABETES MELLITUS (HCC): ICD-10-CM

## 2024-09-10 DIAGNOSIS — E29.1 HYPOGONADISM IN MALE: ICD-10-CM

## 2024-09-10 DIAGNOSIS — Z96.41 INSULIN PUMP IN PLACE: ICD-10-CM

## 2024-09-10 DIAGNOSIS — E29.1 HYPOGONADISM IN MALE: Primary | ICD-10-CM

## 2024-09-10 LAB
ANION GAP SERPL CALCULATED.3IONS-SCNC: 13 MEQ/L (ref 9–15)
BUN SERPL-MCNC: 16 MG/DL (ref 6–20)
CALCIUM SERPL-MCNC: 10.2 MG/DL (ref 8.5–9.9)
CHLORIDE SERPL-SCNC: 99 MEQ/L (ref 95–107)
CHP ED QC CHECK: NORMAL
CO2 SERPL-SCNC: 25 MEQ/L (ref 20–31)
CREAT SERPL-MCNC: 1 MG/DL (ref 0.7–1.2)
CREAT UR-MCNC: 170 MG/DL
ERYTHROCYTE [DISTWIDTH] IN BLOOD BY AUTOMATED COUNT: 13.3 % (ref 11.5–14.5)
ESTIMATED AVERAGE GLUCOSE: 151 MG/DL
GLUCOSE BLD-MCNC: 155 MG/DL
GLUCOSE SERPL-MCNC: 154 MG/DL (ref 70–99)
HBA1C MFR BLD: 6.9 %
HBA1C MFR BLD: 6.9 % (ref 4–6)
HCT VFR BLD AUTO: 40.2 % (ref 42–52)
HGB BLD-MCNC: 14.4 G/DL (ref 14–18)
MCH RBC QN AUTO: 31.4 PG (ref 27–31.3)
MCHC RBC AUTO-ENTMCNC: 35.8 % (ref 33–37)
MCV RBC AUTO: 87.8 FL (ref 79–92.2)
MICROALBUMIN UR-MCNC: <1.2 MG/DL
MICROALBUMIN/CREAT UR-RTO: NORMAL MG/G (ref 0–30)
PLATELET # BLD AUTO: 182 K/UL (ref 130–400)
POTASSIUM SERPL-SCNC: 4.2 MEQ/L (ref 3.4–4.9)
RBC # BLD AUTO: 4.58 M/UL (ref 4.7–6.1)
SHBG SERPL-SCNC: 22 NMOL/L (ref 19–76)
SODIUM SERPL-SCNC: 137 MEQ/L (ref 135–144)
TESTOST FREE SERPL-MCNC: 26.4 PG/ML (ref 47–244)
TESTOST SERPL-MCNC: 115 NG/DL (ref 193–740)
WBC # BLD AUTO: 7.7 K/UL (ref 4.8–10.8)

## 2024-09-10 PROCEDURE — 3078F DIAST BP <80 MM HG: CPT | Performed by: INTERNAL MEDICINE

## 2024-09-10 PROCEDURE — 3074F SYST BP LT 130 MM HG: CPT | Performed by: INTERNAL MEDICINE

## 2024-09-10 PROCEDURE — G8417 CALC BMI ABV UP PARAM F/U: HCPCS | Performed by: INTERNAL MEDICINE

## 2024-09-10 PROCEDURE — 1036F TOBACCO NON-USER: CPT | Performed by: INTERNAL MEDICINE

## 2024-09-10 PROCEDURE — 82962 GLUCOSE BLOOD TEST: CPT | Performed by: INTERNAL MEDICINE

## 2024-09-10 PROCEDURE — 3044F HG A1C LEVEL LT 7.0%: CPT | Performed by: INTERNAL MEDICINE

## 2024-09-10 PROCEDURE — 99214 OFFICE O/P EST MOD 30 MIN: CPT | Performed by: INTERNAL MEDICINE

## 2024-09-10 PROCEDURE — 2022F DILAT RTA XM EVC RTNOPTHY: CPT | Performed by: INTERNAL MEDICINE

## 2024-09-10 PROCEDURE — 83036 HEMOGLOBIN GLYCOSYLATED A1C: CPT | Performed by: INTERNAL MEDICINE

## 2024-09-10 PROCEDURE — 3017F COLORECTAL CA SCREEN DOC REV: CPT | Performed by: INTERNAL MEDICINE

## 2024-09-10 PROCEDURE — G8427 DOCREV CUR MEDS BY ELIG CLIN: HCPCS | Performed by: INTERNAL MEDICINE

## 2024-09-10 RX ORDER — ORAL SEMAGLUTIDE 14 MG/1
TABLET ORAL
Qty: 90 TABLET | Refills: 3 | Status: SHIPPED | OUTPATIENT
Start: 2024-09-10

## 2024-09-10 RX ORDER — TESTOSTERONE GEL, 1% 10 MG/G
2 GEL TRANSDERMAL DAILY
Qty: 180 EACH | Refills: 3 | Status: SHIPPED | OUTPATIENT
Start: 2024-09-10 | End: 2024-09-11 | Stop reason: SDUPTHER

## 2024-09-11 DIAGNOSIS — E29.1 HYPOGONADISM IN MALE: ICD-10-CM

## 2024-09-11 RX ORDER — TESTOSTERONE GEL, 1% 10 MG/G
2 GEL TRANSDERMAL DAILY
Qty: 900 G | Refills: 1 | Status: SHIPPED | OUTPATIENT
Start: 2024-09-11 | End: 2024-12-10

## 2024-09-20 DIAGNOSIS — G62.9 POLYNEUROPATHY, UNSPECIFIED: ICD-10-CM

## 2024-09-20 RX ORDER — GABAPENTIN 600 MG/1
600 TABLET ORAL
Qty: 150 TABLET | Refills: 2 | Status: SHIPPED | OUTPATIENT
Start: 2024-09-20

## 2024-09-20 RX ORDER — GABAPENTIN 600 MG/1
600 TABLET ORAL
Qty: 150 TABLET | Refills: 2 | Status: SHIPPED | OUTPATIENT
Start: 2024-09-20 | End: 2024-09-20 | Stop reason: SDUPTHER

## 2024-09-20 NOTE — TELEPHONE ENCOUNTER
Rx Refill Request Telephone Encounter    Name:  Sebastian Kenney  :  [id number]  Medication Name:  gabapentin (Neurontin) 600 mg   Specific Pharmacy location:  [facility]  Date of last appointment:    Date of next appointment:  NA  Best number to reach patient:  [phone]

## 2024-09-20 NOTE — TELEPHONE ENCOUNTER
Rx Refill Request     Name: Sebastian Kenney  :  1965   Medication Name:  gabapentin (Neurontin) 600 mg tablet   Specific Pharmacy location:  [facility]/pharmacy #[id number] [address] [place]   Date of last appointment:  2024   Date of next appointment:  2024   Best number to reach patient:  [phone]     
Detail Level: Detailed
Detail Level: Generalized

## 2024-11-12 DIAGNOSIS — I25.709 ATHEROSCLEROSIS OF CORONARY ARTERY BYPASS GRAFT OF NATIVE HEART WITH ANGINA PECTORIS: ICD-10-CM

## 2024-11-12 DIAGNOSIS — Z95.1 S/P CABG X 4: ICD-10-CM

## 2024-11-12 DIAGNOSIS — E78.2 MIXED HYPERLIPIDEMIA: ICD-10-CM

## 2024-11-12 RX ORDER — ATORVASTATIN CALCIUM 80 MG/1
80 TABLET, FILM COATED ORAL DAILY
Qty: 90 TABLET | Refills: 3 | Status: SHIPPED | OUTPATIENT
Start: 2024-11-12 | End: 2025-11-12

## 2024-11-12 NOTE — TELEPHONE ENCOUNTER
Received request for prescription refills for patient.   Patient follows with Dr. Phillips     Request is for Atorvastatin 80mg QD  Is patient currently on medication yes    Last OV 7/17/24  Next OV 7/16/25    Pended for signing and sent to provider

## 2024-11-19 ENCOUNTER — APPOINTMENT (OUTPATIENT)
Dept: PRIMARY CARE | Facility: CLINIC | Age: 59
End: 2024-11-19
Payer: COMMERCIAL

## 2024-11-19 VITALS
HEART RATE: 86 BPM | OXYGEN SATURATION: 96 % | SYSTOLIC BLOOD PRESSURE: 137 MMHG | HEIGHT: 74 IN | BODY MASS INDEX: 36.68 KG/M2 | DIASTOLIC BLOOD PRESSURE: 86 MMHG | WEIGHT: 285.8 LBS

## 2024-11-19 DIAGNOSIS — Z79.4 TYPE 2 DIABETES MELLITUS WITH DIABETIC NEUROPATHY, WITH LONG-TERM CURRENT USE OF INSULIN: ICD-10-CM

## 2024-11-19 DIAGNOSIS — E66.812 CLASS 2 SEVERE OBESITY DUE TO EXCESS CALORIES WITH SERIOUS COMORBIDITY AND BODY MASS INDEX (BMI) OF 35.0 TO 35.9 IN ADULT: ICD-10-CM

## 2024-11-19 DIAGNOSIS — E66.01 CLASS 2 SEVERE OBESITY DUE TO EXCESS CALORIES WITH SERIOUS COMORBIDITY AND BODY MASS INDEX (BMI) OF 35.0 TO 35.9 IN ADULT: ICD-10-CM

## 2024-11-19 DIAGNOSIS — E29.1 HYPOGONADISM MALE: ICD-10-CM

## 2024-11-19 DIAGNOSIS — Z12.11 SCREENING FOR MALIGNANT NEOPLASM OF COLON: ICD-10-CM

## 2024-11-19 DIAGNOSIS — M1A.09X0 IDIOPATHIC CHRONIC GOUT OF MULTIPLE SITES WITHOUT TOPHUS: ICD-10-CM

## 2024-11-19 DIAGNOSIS — I10 ESSENTIAL HYPERTENSION: ICD-10-CM

## 2024-11-19 DIAGNOSIS — E11.40 TYPE 2 DIABETES MELLITUS WITH DIABETIC NEUROPATHY, WITH LONG-TERM CURRENT USE OF INSULIN: ICD-10-CM

## 2024-11-19 DIAGNOSIS — E78.2 MIXED HYPERLIPIDEMIA: ICD-10-CM

## 2024-11-19 DIAGNOSIS — I25.709 ATHEROSCLEROSIS OF CORONARY ARTERY BYPASS GRAFT OF NATIVE HEART WITH ANGINA PECTORIS: Primary | ICD-10-CM

## 2024-11-19 PROBLEM — Q25.0 PDA (PATENT DUCTUS ARTERIOSUS) (HHS-HCC): Status: RESOLVED | Noted: 2023-11-29 | Resolved: 2024-11-19

## 2024-11-19 ASSESSMENT — ENCOUNTER SYMPTOMS
DIARRHEA: 0
FATIGUE: 0
HEMATURIA: 0
ABDOMINAL PAIN: 0
ADENOPATHY: 0
BRUISES/BLEEDS EASILY: 0
NUMBNESS: 0
NERVOUS/ANXIOUS: 0
HEADACHES: 0
ARTHRALGIAS: 0
VOMITING: 0
BACK PAIN: 0
CHEST TIGHTNESS: 0
DIFFICULTY URINATING: 0
EYE DISCHARGE: 0
DIZZINESS: 0
ACTIVITY CHANGE: 0
SHORTNESS OF BREATH: 0
NAUSEA: 0
WEAKNESS: 0
COUGH: 0
MYALGIAS: 0
CONSTIPATION: 0
BLOOD IN STOOL: 0
DYSPHORIC MOOD: 0
SORE THROAT: 0
NECK PAIN: 0

## 2024-11-19 NOTE — PROGRESS NOTES
"Subjective   Patient ID: Sebastian Kenney \"Kwaku" is a 59 y.o. male who presents for Follow-up.  HPI  Coronary disease stable no angina    Due for colon cancer screening  Sometimes gets some upset stomach and no red flag symptoms reported    Hypertension stable no chest pain or shortness of breath    High cholesterol stable  Watch diet follow blood work  Recent blood work noted    Obese  Recommend weight loss and diet    Hypergonadism stable    Diabetes stable  Recent blood work noted  Follow blood work  Low carb diet recommended    Gout stable  Low purine diet recommended  Review of Systems   Constitutional:  Negative for activity change and fatigue.   HENT:  Negative for congestion and sore throat.    Eyes:  Negative for discharge.   Respiratory:  Negative for cough, chest tightness and shortness of breath.    Cardiovascular:  Negative for chest pain and leg swelling.   Gastrointestinal:  Negative for abdominal pain, blood in stool, constipation, diarrhea, nausea and vomiting.   Endocrine: Negative for cold intolerance and heat intolerance.   Genitourinary:  Negative for difficulty urinating and hematuria.   Musculoskeletal:  Negative for arthralgias, back pain, gait problem, myalgias and neck pain.   Allergic/Immunologic: Negative for environmental allergies.   Neurological:  Negative for dizziness, syncope, weakness, numbness and headaches.   Hematological:  Negative for adenopathy. Does not bruise/bleed easily.   Psychiatric/Behavioral:  Negative for dysphoric mood. The patient is not nervous/anxious.    All other systems reviewed and are negative.      Objective   /86 (BP Location: Right arm, BP Cuff Size: Large adult)   Pulse 86   Ht 1.88 m (6' 2\")   Wt 130 kg (285 lb 12.8 oz)   SpO2 96%   BMI 36.69 kg/m²    Physical Exam  Vitals and nursing note reviewed.   Constitutional:       General: He is not in acute distress.     Appearance: Normal appearance.   HENT:      Head: Normocephalic and atraumatic.    "   Right Ear: Tympanic membrane, ear canal and external ear normal.      Left Ear: Tympanic membrane, ear canal and external ear normal.      Nose: Nose normal.      Mouth/Throat:      Mouth: Mucous membranes are moist.      Pharynx: Oropharynx is clear. No oropharyngeal exudate or posterior oropharyngeal erythema.   Eyes:      Extraocular Movements: Extraocular movements intact.      Conjunctiva/sclera: Conjunctivae normal.      Pupils: Pupils are equal, round, and reactive to light.   Cardiovascular:      Rate and Rhythm: Normal rate and regular rhythm.      Pulses: Normal pulses.      Heart sounds: Normal heart sounds. No murmur heard.  Pulmonary:      Effort: Pulmonary effort is normal. No respiratory distress.      Breath sounds: Normal breath sounds. No wheezing or rales.   Abdominal:      General: Abdomen is flat. Bowel sounds are normal. There is no distension.      Palpations: Abdomen is soft. There is no mass.      Tenderness: There is no abdominal tenderness.   Musculoskeletal:         General: No swelling or deformity. Normal range of motion.      Cervical back: Normal range of motion and neck supple.      Right lower leg: No edema.      Left lower leg: No edema.   Lymphadenopathy:      Cervical: No cervical adenopathy.   Skin:     General: Skin is warm and dry.      Capillary Refill: Capillary refill takes less than 2 seconds.      Findings: No lesion or rash.   Neurological:      General: No focal deficit present.      Mental Status: He is alert and oriented to person, place, and time.      Cranial Nerves: No cranial nerve deficit.      Motor: No weakness.   Psychiatric:         Mood and Affect: Mood normal.         Behavior: Behavior normal.         Thought Content: Thought content normal.         Judgment: Judgment normal.         Assessment/Plan   Problem List Items Addressed This Visit       Essential hypertension    Mixed hyperlipidemia    Idiopathic chronic gout of multiple sites without tophus     Hypogonadism male    Type 2 diabetes mellitus with diabetic neuropathy, with long-term current use of insulin    Class 2 severe obesity due to excess calories with serious comorbidity and body mass index (BMI) of 35.0 to 35.9 in adult    Atherosclerosis of coronary artery bypass graft of native heart with angina pectoris - Primary     Other Visit Diagnoses       Screening for malignant neoplasm of colon        Relevant Orders    Colonoscopy Screening; Average Risk Patient            Patient education provided.  Stay current with age appropriate health maintenance as instructed.  Appointment here or ER with new or worsening symptoms'  Keep appropriate follow-up visit.  Stay current with proper immunizations he declines discuss risks    3 months  Weight loss and diet

## 2024-11-25 DIAGNOSIS — Z12.11 COLON CANCER SCREENING: ICD-10-CM

## 2024-11-25 RX ORDER — POLYETHYLENE GLYCOL 3350, SODIUM CHLORIDE, SODIUM BICARBONATE, POTASSIUM CHLORIDE 420; 11.2; 5.72; 1.48 G/4L; G/4L; G/4L; G/4L
4000 POWDER, FOR SOLUTION ORAL ONCE
Qty: 4000 ML | Refills: 0 | Status: SHIPPED | OUTPATIENT
Start: 2024-11-25 | End: 2024-11-25

## 2024-12-12 ENCOUNTER — OFFICE VISIT (OUTPATIENT)
Dept: ENDOCRINOLOGY | Age: 59
End: 2024-12-12

## 2024-12-12 VITALS
HEIGHT: 74 IN | DIASTOLIC BLOOD PRESSURE: 79 MMHG | HEART RATE: 94 BPM | BODY MASS INDEX: 36.57 KG/M2 | WEIGHT: 285 LBS | SYSTOLIC BLOOD PRESSURE: 121 MMHG

## 2024-12-12 DIAGNOSIS — E11.42 DIABETIC POLYNEUROPATHY ASSOCIATED WITH TYPE 2 DIABETES MELLITUS (HCC): ICD-10-CM

## 2024-12-12 DIAGNOSIS — E29.1 HYPOGONADISM IN MALE: Primary | ICD-10-CM

## 2024-12-12 DIAGNOSIS — Z96.41 INSULIN PUMP IN PLACE: ICD-10-CM

## 2024-12-12 LAB
CHP ED QC CHECK: NORMAL
GLUCOSE BLD-MCNC: 149 MG/DL
HBA1C MFR BLD: 8.2 %

## 2024-12-12 RX ORDER — SEMAGLUTIDE 1.34 MG/ML
INJECTION, SOLUTION SUBCUTANEOUS
Qty: 3 ADJUSTABLE DOSE PRE-FILLED PEN SYRINGE | Refills: 3 | Status: SHIPPED | OUTPATIENT
Start: 2024-12-12

## 2024-12-12 RX ORDER — TESTOSTERONE ENANTHATE 75 MG/.5ML
INJECTION SUBCUTANEOUS
Qty: 4 ADJUSTABLE DOSE PRE-FILLED PEN SYRINGE | Refills: 3 | Status: SHIPPED | OUTPATIENT
Start: 2024-12-12

## 2024-12-12 NOTE — PROGRESS NOTES
2024    Assessment:       Diagnosis Orders   1. Hypogonadism in male  CBC    Testosterone, free, total    Testosterone Enanthate (XYOSTED) 75 MG/0.5ML SOAJ      2. Diabetic polyneuropathy associated with type 2 diabetes mellitus (HCC)  POCT Glucose    POCT glycosylated hemoglobin (Hb A1C)    Basic Metabolic Panel    Testosterone Enanthate (XYOSTED) 75 MG/0.5ML SOAJ      3. Insulin pump in place  Testosterone Enanthate (XYOSTED) 75 MG/0.5ML SOAJ          PLAN:     Orders Placed This Encounter   Procedures    Basic Metabolic Panel     Standing Status:   Future     Standing Expiration Date:   2025    CBC     Standing Status:   Future     Standing Expiration Date:   2025    Testosterone, free, total     Standing Status:   Future     Standing Expiration Date:   2025    POCT Glucose    POCT glycosylated hemoglobin (Hb A1C)     Orders Placed This Encounter   Medications    Semaglutide,0.25 or 0.5MG/DOS, (OZEMPIC, 0.25 OR 0.5 MG/DOSE,) 2 MG/1.5ML SOPN     Si.5 mg once a week     Dispense:  3 Adjustable Dose Pre-filled Pen Syringe     Refill:  3    Testosterone Enanthate (XYOSTED) 75 MG/0.5ML SOAJ     Sig: Once a week     Dispense:  4 Adjustable Dose Pre-filled Pen Syringe     Refill:  3     Add Ozempic to current insulin regimen via pump  Also switch to AndroGel 2 Xyosted subcutaneous injection  A1c goal of less than 7  Testosterone goal of 500  More than 50% of 30 minutes spent in patient education counseling    Orders Placed This Encounter   Procedures    POCT Glucose    POCT glycosylated hemoglobin (Hb A1C)     No orders of the defined types were placed in this encounter.    No follow-ups on file.  Subjective:     Chief Complaint   Patient presents with    Hypogonadism    Diabetes     Insulin Pump     Vitals:    24 1611   BP: 121/79   Pulse: 94   Weight: 129.3 kg (285 lb)   Height: 1.88 m (6' 2.02\")     Wt Readings from Last 3 Encounters:   24 129.3 kg (285 lb)   09/10/24 127.9 kg

## 2024-12-13 ASSESSMENT — ENCOUNTER SYMPTOMS: EYES NEGATIVE: 1

## 2024-12-14 DIAGNOSIS — Z95.1 S/P CABG X 4: ICD-10-CM

## 2024-12-14 DIAGNOSIS — I25.118 CORONARY ARTERY DISEASE OF NATIVE ARTERY OF NATIVE HEART WITH STABLE ANGINA PECTORIS: ICD-10-CM

## 2024-12-16 RX ORDER — SEMAGLUTIDE 0.68 MG/ML
INJECTION, SOLUTION SUBCUTANEOUS
Qty: 3 ML | Refills: 3 | Status: SHIPPED | OUTPATIENT
Start: 2024-12-16

## 2024-12-16 NOTE — TELEPHONE ENCOUNTER
Pharmacy calling rx for ozempic was sent over for the 1.5 ml which is no longer available.  Please change to the 3 ml and resend in

## 2024-12-16 NOTE — TELEPHONE ENCOUNTER
Received request for prescription refills for patient.   Patient follows with Dr. Phillips    Request is for Plavix  Is patient currently on medication not noted-left message on pt's voicemail to call back to verify if her is taking plavix    Last OV 7/17/24  Next OV 7/16/25

## 2024-12-17 RX ORDER — CLOPIDOGREL BISULFATE 75 MG/1
75 TABLET ORAL DAILY
Qty: 90 TABLET | Refills: 3 | Status: SHIPPED | OUTPATIENT
Start: 2024-12-17

## 2024-12-18 ENCOUNTER — DOCUMENTATION (OUTPATIENT)
Dept: CARDIOLOGY | Facility: HOSPITAL | Age: 59
End: 2024-12-18
Payer: COMMERCIAL

## 2024-12-18 NOTE — PROGRESS NOTES
Unable to reach patient for [employer] follow up.  Phone calls x3 and letter sent.  Watch for in person points of contact for continued follow up.

## 2024-12-27 ENCOUNTER — TELEPHONE (OUTPATIENT)
Dept: CARDIOLOGY | Facility: CLINIC | Age: 59
End: 2024-12-27
Payer: COMMERCIAL

## 2024-12-27 ENCOUNTER — TELEPHONE (OUTPATIENT)
Dept: GASTROENTEROLOGY | Facility: CLINIC | Age: 59
End: 2024-12-27
Payer: COMMERCIAL

## 2024-12-27 NOTE — TELEPHONE ENCOUNTER
POS request from:  Dr. Clement    Surgery date:  1/29/25    Type of surgery: Colonoscopy    Facility:      Phone:  [phone]    Fax:  [phone]      Per Dr. Yoni Phillips , Patient is an acceptable cardiac risk for upcoming surgery.  Form completed and faxed to    [phone]    Fax confirmation received

## 2024-12-30 NOTE — TELEPHONE ENCOUNTER
Left message for patient informed them that Dr. Phillips is permitting them to stop Plavix 5 days prior to procedure on 1/29/25 with Dr. Gimenez.

## 2025-01-04 DIAGNOSIS — G62.9 POLYNEUROPATHY, UNSPECIFIED: ICD-10-CM

## 2025-01-06 RX ORDER — GABAPENTIN 600 MG/1
600 TABLET ORAL
Qty: 150 TABLET | Refills: 2 | Status: SHIPPED | OUTPATIENT
Start: 2025-01-06

## 2025-01-06 NOTE — TELEPHONE ENCOUNTER
Rx Refill Request     Name: Sebastian Kenney  :  1965   Medication Name:  gabapentin (Neurontin) 600 mg tablet   Specific Pharmacy location:  [facility]/pharmacy #[id number] [place], [place]   Date of last appointment:  2024   Date of next appointment:  2025   Best number to reach patient:  [phone]

## 2025-01-07 ENCOUNTER — TELEPHONE (OUTPATIENT)
Dept: ENDOCRINOLOGY | Age: 60
End: 2025-01-07

## 2025-01-17 ENCOUNTER — CLINICAL SUPPORT (OUTPATIENT)
Dept: PREADMISSION TESTING | Facility: HOSPITAL | Age: 60
End: 2025-01-17
Payer: COMMERCIAL

## 2025-01-17 VITALS — HEIGHT: 74 IN | WEIGHT: 280 LBS | BODY MASS INDEX: 35.94 KG/M2

## 2025-01-17 RX ORDER — SEMAGLUTIDE 0.68 MG/ML
0.5 INJECTION, SOLUTION SUBCUTANEOUS
COMMUNITY
Start: 2024-12-17

## 2025-01-17 NOTE — PREPROCEDURE INSTRUCTIONS

## 2025-01-24 DIAGNOSIS — Z12.11 SCREEN FOR COLON CANCER: Primary | ICD-10-CM

## 2025-01-24 RX ORDER — POLYETHYLENE GLYCOL 3350, SODIUM CHLORIDE, SODIUM BICARBONATE, POTASSIUM CHLORIDE 420; 11.2; 5.72; 1.48 G/4L; G/4L; G/4L; G/4L
4000 POWDER, FOR SOLUTION ORAL ONCE
Qty: 4000 ML | Refills: 0 | Status: SHIPPED | OUTPATIENT
Start: 2025-01-24 | End: 2025-01-24

## 2025-01-29 ENCOUNTER — ANESTHESIA (OUTPATIENT)
Dept: GASTROENTEROLOGY | Facility: HOSPITAL | Age: 60
End: 2025-01-29
Payer: COMMERCIAL

## 2025-01-29 ENCOUNTER — ANESTHESIA EVENT (OUTPATIENT)
Dept: GASTROENTEROLOGY | Facility: HOSPITAL | Age: 60
End: 2025-01-29
Payer: COMMERCIAL

## 2025-01-29 ENCOUNTER — HOSPITAL ENCOUNTER (OUTPATIENT)
Dept: GASTROENTEROLOGY | Facility: HOSPITAL | Age: 60
Discharge: HOME | End: 2025-01-29
Payer: COMMERCIAL

## 2025-01-29 VITALS
TEMPERATURE: 96.3 F | SYSTOLIC BLOOD PRESSURE: 132 MMHG | DIASTOLIC BLOOD PRESSURE: 77 MMHG | HEART RATE: 73 BPM | RESPIRATION RATE: 16 BRPM | OXYGEN SATURATION: 95 %

## 2025-01-29 DIAGNOSIS — Z12.11 SCREENING FOR MALIGNANT NEOPLASM OF COLON: ICD-10-CM

## 2025-01-29 LAB — GLUCOSE BLD MANUAL STRIP-MCNC: 86 MG/DL (ref 74–99)

## 2025-01-29 PROCEDURE — 82947 ASSAY GLUCOSE BLOOD QUANT: CPT

## 2025-01-29 PROCEDURE — 7100000010 HC PHASE TWO TIME - EACH INCREMENTAL 1 MINUTE

## 2025-01-29 PROCEDURE — 2500000004 HC RX 250 GENERAL PHARMACY W/ HCPCS (ALT 636 FOR OP/ED): Performed by: NURSE ANESTHETIST, CERTIFIED REGISTERED

## 2025-01-29 PROCEDURE — 3700000002 HC GENERAL ANESTHESIA TIME - EACH INCREMENTAL 1 MINUTE

## 2025-01-29 PROCEDURE — 3700000001 HC GENERAL ANESTHESIA TIME - INITIAL BASE CHARGE

## 2025-01-29 PROCEDURE — 7100000009 HC PHASE TWO TIME - INITIAL BASE CHARGE

## 2025-01-29 PROCEDURE — 45385 COLONOSCOPY W/LESION REMOVAL: CPT | Performed by: INTERNAL MEDICINE

## 2025-01-29 RX ORDER — PROPOFOL 10 MG/ML
INJECTION, EMULSION INTRAVENOUS AS NEEDED
Status: DISCONTINUED | OUTPATIENT
Start: 2025-01-29 | End: 2025-01-29

## 2025-01-29 RX ADMIN — PROPOFOL 50 MG: 10 INJECTION, EMULSION INTRAVENOUS at 08:54

## 2025-01-29 RX ADMIN — PROPOFOL 50 MG: 10 INJECTION, EMULSION INTRAVENOUS at 08:45

## 2025-01-29 RX ADMIN — PROPOFOL 100 MG: 10 INJECTION, EMULSION INTRAVENOUS at 09:02

## 2025-01-29 RX ADMIN — PROPOFOL 50 MG: 10 INJECTION, EMULSION INTRAVENOUS at 08:49

## 2025-01-29 RX ADMIN — PROPOFOL 50 MG: 10 INJECTION, EMULSION INTRAVENOUS at 08:58

## 2025-01-29 RX ADMIN — PROPOFOL 100 MG: 10 INJECTION, EMULSION INTRAVENOUS at 08:53

## 2025-01-29 RX ADMIN — PROPOFOL 200 MG: 10 INJECTION, EMULSION INTRAVENOUS at 08:42

## 2025-01-29 SDOH — HEALTH STABILITY: MENTAL HEALTH: CURRENT SMOKER: 0

## 2025-01-29 ASSESSMENT — COLUMBIA-SUICIDE SEVERITY RATING SCALE - C-SSRS
6. HAVE YOU EVER DONE ANYTHING, STARTED TO DO ANYTHING, OR PREPARED TO DO ANYTHING TO END YOUR LIFE?: NO
2. HAVE YOU ACTUALLY HAD ANY THOUGHTS OF KILLING YOURSELF?: NO
1. IN THE PAST MONTH, HAVE YOU WISHED YOU WERE DEAD OR WISHED YOU COULD GO TO SLEEP AND NOT WAKE UP?: NO

## 2025-01-29 ASSESSMENT — PAIN - FUNCTIONAL ASSESSMENT
PAIN_FUNCTIONAL_ASSESSMENT: 0-10
PAIN_FUNCTIONAL_ASSESSMENT: 0-10

## 2025-01-29 ASSESSMENT — PAIN SCALES - GENERAL
PAIN_LEVEL: 0
PAINLEVEL_OUTOF10: 0 - NO PAIN

## 2025-01-29 NOTE — H&P
Outpatient Hospital Procedure H&P    Patient Profile-Procedures  Initial Info  Patient Demographics  Name Sebastian Kenney  Date of Birth 1965  MRN [id number]  Address   [address] [phone] [facility] [address]    Primary Phone Number [phone]  Secondary Phone Number    PCP Gonzalez Cano    Procedure(s):  Colonoscopy    Primary contact name and number   Extended Emergency Contact Information  Primary Emergency Contact: Veronika Kenney  Home Phone: [phone]  Mobile Phone: [phone]  Relation: Sibling    General Health  Weight There were no vitals filed for this visit.  BMI There is no height or weight on file to calculate BMI.    Allergies  Allergies   Allergen Reactions    Iodinated Contrast Media Hives     IV Contrast Dye       Past Medical History   Past Medical History:   Diagnosis Date    Anemia     Angina pectoris     Arrhythmia     a-flutter    C. difficile colitis     dx 7/30/23    Colon polyp     Coronary artery disease     [facility] 7/25/23: % ISR + collat, 1st diag 75%, LCx mid 95%, RCA mid 50%, PDA distal 90%, LVEF 45%    Diabetes mellitus (Multi)     takes insulin and semaglutide/Rybelsus    Hyperlipidemia     Hypertension     Joint pain     ankle, Charcot foot    Old myocardial infarction     History of myocardial infarction    Peripheral neuropathy     Sleep apnea     + CPAP    Type 2 diabetes mellitus        Provider assessment  Diagnosis: H/o polyps  Medication Reviewed - yes  Prior to Admission medications    Medication Sig Start Date End Date Taking? Authorizing Provider   acetaminophen (Tylenol) 325 mg tablet Take 2 tablets (650 mg) by mouth every 6 hours if needed (for pain). 10/12/23  Yes LISA Angulo-CNP   allopurinol (Zyloprim) 300 mg tablet Take 1 tablet by mouth daily 3/28/23  Yes Gonzalez Cano MD   atorvastatin (Lipitor) 80 mg tablet Take 1 tablet (80 mg) by mouth once daily. 11/12/24 11/12/25 Yes Yoni Phillips,  MD   clopidogrel (Plavix) 75 mg tablet TAKE 1 TABLET BY MOUTH ONCE DAILY. 12/17/24  Yes Yoni Phillips MD   gabapentin (Neurontin) 600 mg tablet TAKE 1 TABLET (600 MG) BY MOUTH 5 TIMES A DAY. 1/6/25  Yes Gonzalez Cano MD   lisinopril 10 mg tablet Take 1 tablet (10 mg) by mouth once daily. 7/17/24 7/17/25 Yes Yoni Phillips MD   multivitamin with minerals tablet Take 1 tablet by mouth once daily. 10/12/23  Yes Carol Esparza, APRN-CNP   NovoLOG U-100 Insulin aspart 100 unit/mL injection USE VIA INSULIN PUMP, MAX DAILY DOSE 200 UNITS   Yes Historical Provider, MD   testosterone (Androgel) 50 mg/5 gram (1 %) gel Place 1 packet (50 mg) on the skin 2 times a day.   Yes Historical Provider, MD   Ozempic 0.25 mg or 0.5 mg (2 mg/3 mL) pen injector Inject 0.5 mg under the skin every 7 days. 12/17/24   Historical Provider, MD   polyethylene glycol-electrolytes (Nulytely) 420 gram solution Take 4,000 mL by mouth 1 time for 1 dose. 1/24/25 1/24/25  Feroz Clement MD       Physical Exam  There were no vitals filed for this visit.     General: A&Ox3, NAD.  CV: RRR. No murmur.  Resp: CTA bilaterally. No wheezing, rhonchi or rales.   Extrem: No edema.       Oropharyngeal Classification III (soft and hard palate and base of uvula visible)  ASA PS Classification 3  Sedation Plan Deep  Procedure Plan - pre-procedural (re)assesment completed by physician:  discharge/transfer patient when discharge criteria met    Feroz Clement MD  1/29/2025 8:06 AM

## 2025-01-29 NOTE — Clinical Note
Patient tolerated procedure well. Appears comfortable with no complaints of pain. VS stable. Arousable prior to transport. Patient transported to [facility] via cart.  Report called  per crna            . Handoff completed

## 2025-01-29 NOTE — Clinical Note
Huddle and Timeout completed together with team. Patient wristband and HARPER information verified.  Anesthesia safety check completed

## 2025-01-29 NOTE — ANESTHESIA PREPROCEDURE EVALUATION
Sebastian Kenney is a 59 y.o. 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9 10/12/2023     10/12/2023     10/12/2023    K 4.1 10/12/2023    CL 97 (L) 10/12/2023    CREATININE 0.93 10/12/2023    BUN 17 10/12/2023       Social History     Tobacco Use   Smoking Status Never   • Passive exposure: Never   Smokeless Tobacco Never       Allergies   Allergen Reactions   • Iodinated Contrast Media Hives     IV Contrast Dye       Current Outpatient Medications   Medication Instructions   • acetaminophen (TYLENOL) 650 mg, oral, Every 6 hours PRN   • allopurinol (Zyloprim) 300 mg tablet Take 1 tablet by mouth daily   • atorvastatin (LIPITOR) 80 mg, oral, Daily   • clopidogrel (PLAVIX) 75 mg, oral, Daily   • gabapentin (NEURONTIN) 600 mg, oral, 5 times daily   • lisinopril 10 mg, oral, Daily   • multivitamin with minerals tablet 1 tablet, oral, Daily   • NovoLOG U-100 Insulin aspart 100 unit/mL injection USE VIA INSULIN PUMP, MAX DAILY DOSE 200 UNITS   • Ozempic 0.5 mg, Every 7 days   • testosterone (ANDROGEL) 50 mg, 2 times daily       Past Surgical History:   Procedure Laterality Date   • CARDIAC CATHETERIZATION      x2   • COLONOSCOPY     • CORONARY ARTERY  BYPASS GRAFT      4 vessel       Family History   Problem Relation Name Age of Onset   • Other (Cardiac Disorder) Mother     • Breast cancer Mother     • Cancer Father         NPO Details:  No data recorded    Physical Exam    Airway  Mallampati: II  TM distance: >3 FB  Neck ROM: full     Cardiovascular - normal exam     Dental - normal exam     Pulmonary - normal exam     Abdominal          Anesthesia Plan    History of general anesthesia?: yes  History of complications of general anesthesia?: no    ASA 3     MAC     The patient is not a current smoker.    intravenous induction   Anesthetic plan and risks discussed with patient.    Plan discussed with CRNA.

## 2025-01-29 NOTE — ANESTHESIA POSTPROCEDURE EVALUATION
"Patient: Sebastian Kenney \"Tim\"    Procedure Summary       Date: 01/29/25 Room / Location: [facility]    Anesthesia Start: 0832 Anesthesia Stop:     Procedure: COLONOSCOPY Diagnosis: Screening for malignant neoplasm of colon    Scheduled Providers: Feroz Clement MD; Emanuel Hernandez DO; Radha Elaine RN; Princess Pradhan Responsible Provider: Emanuel Hernandez DO    Anesthesia Type: MAC ASA Status: 3            Anesthesia Type: MAC    Vitals Value Taken Time   BP 92/52 01/29/25 0908   Temp 36.5 01/29/25 0908   Pulse 73 01/29/25 0908   Resp 18 01/29/25 0908   SpO2 99 01/29/25 0908       Anesthesia Post Evaluation    Patient location during evaluation: bedside  Patient participation: complete - patient participated  Level of consciousness: awake and alert  Pain score: 0  Pain management: adequate  Airway patency: patent  Cardiovascular status: acceptable and stable  Respiratory status: acceptable and room air  Hydration status: acceptable  Postoperative Nausea and Vomiting: none      No notable events documented.    "

## 2025-01-29 NOTE — DISCHARGE INSTRUCTIONS
Patient Instructions after a Colonoscopy      The anesthetics, sedatives or narcotics which were given to you today will be acting in your body for the next 24 hours, so you might feel a little sleepy or groggy.  This feeling should slowly wear off. Carefully read and follow the instructions.     You received sedation today:  - Do not drive or operate any machinery or power tools of any kind.   - No alcoholic beverages today, not even beer or wine.  - Do not make any important decisions or sign any legal documents.  - No over the counter medications that contain alcohol or that may cause drowsiness.  - Do not make any important decisions or sign any legal documents.    While it is common to experience mild to moderate abdominal distention, gas, or belching after your procedure, if any of these symptoms occur following discharge from the GI Lab or within one week of having your procedure, call the [facility] to be advised whether a visit to your nearest Urgent Care or Emergency Department is indicated.  Take this paper with you if you go.     - If you develop an allergic reaction to the medications that were given during your procedure such as difficulty breathing, rash, hives, severe nausea, vomiting or lightheadedness.  - If you experience chest pain, shortness of breath, severe abdominal pain, fevers and chills.  -If you develop signs and symptoms of bleeding such as blood in your spit, if your stools turn black, tarry, or bloody  - If you have not urinated within 8 hours following your procedure.  - If your IV site becomes painful, red, inflamed, or looks infected.    If you received a biopsy/polypectomy/sphincterotomy the following instructions apply below:    __ Do not use Aspirin containing products, non-steroidal medications or anti-coagulants for one week following your procedure. (Examples of these types of medications are: Advil, Arthrotec, Aleve, Coumadin, Ecotrin, Heparin, Ibuprofen,  Indocin, Motrin, Naprosyn, Nuprin, Plavix, Vioxx, and Voltarin, or their generic forms.  This list is not all-inclusive.  Check with your physician or pharmacist before resuming medications.)   __ Eat a soft diet today.  Avoid foods that are poorly digested for the next 24 hours.  These foods would include: nuts, beans, lettuce, red meats, and fried foods. Start with liquids and advance your diet as tolerated, gradually work up to eating solids.   __ Do not have a Barium Study or Enema for one week.    Your physician recommends the additional following instructions:    -You have a contact number available for emergencies. The signs and symptoms of potential delayed complications were discussed with you. You may return to normal activities tomorrow.  -Resume your previous diet.  -Continue your present medications.   -We are waiting for your pathology results.  -Your physician has recommended a repeat colonoscopy (date to be determined after pending pathology results are reviewed) for surveillance based on pathology results.  -The findings and recommendations have been discussed with you.  -The findings and recommendations were discussed with your family.  - Please see Medication Reconciliation Form for new medication/medications prescribed.       If you experience any problems or have any questions following discharge from the GI Lab, please call:  Before 5p.m.  [phone]  After 5p.m.    [phone]    Nurse Signature                                                                        Date___________________                                                                            Patient/Responsible Party Signature                                        Date___________________

## 2025-02-04 LAB
LABORATORY COMMENT REPORT: NORMAL
PATH REPORT.FINAL DX SPEC: NORMAL
PATH REPORT.GROSS SPEC: NORMAL
PATH REPORT.RELEVANT HX SPEC: NORMAL
PATH REPORT.TOTAL CANCER: NORMAL

## 2025-02-18 NOTE — PROGRESS NOTES
"Subjective   Patient ID: Sebastian Kenney \"Kwaku" is a 59 y.o. male who presents for Follow-up.  HPI    Coronary disease stable  No angina  Keep cardiac follow-up    Trigger finger of the hands bilaterally with bilateral hand pain recommend x-rays might need orthopedic evaluation    A-fib stable keep cardiac follow-up    Status post respiratory failure breathing good at this time    Obese recommend weight loss and diet    Diabetes stable  Lifestyle modification and risk factor modification  Stay current with diabetic eye exam  Keep endocrinology follow-up as well      Review of Systems   Constitutional:  Negative for activity change and fatigue.   HENT:  Negative for congestion and sore throat.    Eyes:  Negative for discharge.   Respiratory:  Negative for cough, chest tightness and shortness of breath.    Cardiovascular:  Negative for chest pain and leg swelling.   Gastrointestinal:  Negative for abdominal pain, blood in stool, constipation, diarrhea, nausea and vomiting.   Endocrine: Negative for cold intolerance and heat intolerance.   Genitourinary:  Negative for difficulty urinating and hematuria.   Musculoskeletal:  Negative for arthralgias, back pain, gait problem, myalgias and neck pain.   Allergic/Immunologic: Negative for environmental allergies.   Neurological:  Negative for dizziness, syncope, weakness, numbness and headaches.   Hematological:  Negative for adenopathy. Does not bruise/bleed easily.   Psychiatric/Behavioral:  Negative for dysphoric mood. The patient is not nervous/anxious.    All other systems reviewed and are negative.      Objective   /77 (BP Location: Right arm, BP Cuff Size: Large adult)   Pulse 85   Ht 1.88 m (6' 2\")   Wt 129 kg (285 lb)   SpO2 96%   BMI 36.59 kg/m²    Physical Exam  Vitals and nursing note reviewed.   Constitutional:       General: He is not in acute distress.     Appearance: Normal appearance. He is obese.   HENT:      Head: Normocephalic and atraumatic.     "  Right Ear: Tympanic membrane, ear canal and external ear normal.      Left Ear: Tympanic membrane, ear canal and external ear normal.      Nose: Nose normal.      Mouth/Throat:      Mouth: Mucous membranes are moist.      Pharynx: Oropharynx is clear. No oropharyngeal exudate or posterior oropharyngeal erythema.   Eyes:      Extraocular Movements: Extraocular movements intact.      Conjunctiva/sclera: Conjunctivae normal.      Pupils: Pupils are equal, round, and reactive to light.   Cardiovascular:      Rate and Rhythm: Normal rate and regular rhythm.      Pulses: Normal pulses.      Heart sounds: Normal heart sounds. No murmur heard.  Pulmonary:      Effort: Pulmonary effort is normal. No respiratory distress.      Breath sounds: Normal breath sounds. No wheezing or rales.   Abdominal:      General: Abdomen is flat. Bowel sounds are normal. There is no distension.      Palpations: Abdomen is soft. There is no mass.      Tenderness: There is no abdominal tenderness.   Musculoskeletal:         General: No swelling or deformity. Normal range of motion.      Cervical back: Normal range of motion and neck supple.      Right lower leg: No edema.      Left lower leg: No edema.   Lymphadenopathy:      Cervical: No cervical adenopathy.   Skin:     General: Skin is warm and dry.      Capillary Refill: Capillary refill takes less than 2 seconds.      Findings: No lesion or rash.   Neurological:      General: No focal deficit present.      Mental Status: He is alert and oriented to person, place, and time.      Cranial Nerves: No cranial nerve deficit.      Motor: No weakness.   Psychiatric:         Mood and Affect: Mood normal.         Behavior: Behavior normal.         Thought Content: Thought content normal.         Judgment: Judgment normal.         Assessment/Plan   Problem List Items Addressed This Visit       Type 2 diabetes mellitus with diabetic neuropathy, with long-term current use of insulin    Atherosclerosis of  coronary artery bypass graft of native heart with angina pectoris - Primary    Bilateral hand pain    Relevant Orders    XR hand left 3+ views    XR hand right 3+ views    Referral to Orthopaedic Surgery    Atrial fibrillation, unspecified type (Multi)    Acute respiratory failure with hypoxia (Multi)     Other Visit Diagnoses       Trigger finger of right thumb        Relevant Orders    Follow Up In [facility] - PCP    Referral to Orthopaedic Surgery    Class 2 severe obesity due to excess calories with serious comorbidity and body mass index (BMI) of 36.0 to 36.9 in adult                Patient education provided.  Stay current with age appropriate health maintenance as instructed.  Appointment here or ER with new or worsening symptoms'  Keep appropriate follow-up visit.  Stay current with proper immunizations   X-rays as above and referral as above  Keep cardiac follow-up  Recheck 3 months and as needed  Weight loss and diet

## 2025-02-20 ENCOUNTER — APPOINTMENT (OUTPATIENT)
Dept: PRIMARY CARE | Facility: CLINIC | Age: 60
End: 2025-02-20
Payer: COMMERCIAL

## 2025-02-20 VITALS
WEIGHT: 285 LBS | HEART RATE: 85 BPM | BODY MASS INDEX: 36.57 KG/M2 | SYSTOLIC BLOOD PRESSURE: 133 MMHG | OXYGEN SATURATION: 96 % | HEIGHT: 74 IN | DIASTOLIC BLOOD PRESSURE: 77 MMHG

## 2025-02-20 DIAGNOSIS — J96.01 ACUTE RESPIRATORY FAILURE WITH HYPOXIA (MULTI): ICD-10-CM

## 2025-02-20 DIAGNOSIS — M79.642 BILATERAL HAND PAIN: ICD-10-CM

## 2025-02-20 DIAGNOSIS — E66.01 CLASS 2 SEVERE OBESITY DUE TO EXCESS CALORIES WITH SERIOUS COMORBIDITY AND BODY MASS INDEX (BMI) OF 36.0 TO 36.9 IN ADULT: ICD-10-CM

## 2025-02-20 DIAGNOSIS — E66.812 CLASS 2 SEVERE OBESITY DUE TO EXCESS CALORIES WITH SERIOUS COMORBIDITY AND BODY MASS INDEX (BMI) OF 36.0 TO 36.9 IN ADULT: ICD-10-CM

## 2025-02-20 DIAGNOSIS — I48.91 ATRIAL FIBRILLATION, UNSPECIFIED TYPE (MULTI): ICD-10-CM

## 2025-02-20 DIAGNOSIS — I25.709 ATHEROSCLEROSIS OF CORONARY ARTERY BYPASS GRAFT OF NATIVE HEART WITH ANGINA PECTORIS: Primary | ICD-10-CM

## 2025-02-20 DIAGNOSIS — M65.311 TRIGGER FINGER OF RIGHT THUMB: ICD-10-CM

## 2025-02-20 DIAGNOSIS — Z79.4 TYPE 2 DIABETES MELLITUS WITH DIABETIC NEUROPATHY, WITH LONG-TERM CURRENT USE OF INSULIN: ICD-10-CM

## 2025-02-20 DIAGNOSIS — E11.40 TYPE 2 DIABETES MELLITUS WITH DIABETIC NEUROPATHY, WITH LONG-TERM CURRENT USE OF INSULIN: ICD-10-CM

## 2025-02-20 DIAGNOSIS — M79.641 BILATERAL HAND PAIN: ICD-10-CM

## 2025-02-20 ASSESSMENT — ENCOUNTER SYMPTOMS
CHEST TIGHTNESS: 0
HEADACHES: 0
MYALGIAS: 0
DIARRHEA: 0
BLOOD IN STOOL: 0
NERVOUS/ANXIOUS: 0
NAUSEA: 0
HEMATURIA: 0
NECK PAIN: 0
BRUISES/BLEEDS EASILY: 0
DYSPHORIC MOOD: 0
NUMBNESS: 0
ABDOMINAL PAIN: 0
SHORTNESS OF BREATH: 0
VOMITING: 0
WEAKNESS: 0
SORE THROAT: 0
ARTHRALGIAS: 0
CONSTIPATION: 0
DIFFICULTY URINATING: 0
COUGH: 0
ADENOPATHY: 0
BACK PAIN: 0
EYE DISCHARGE: 0
ACTIVITY CHANGE: 0
FATIGUE: 0
DIZZINESS: 0

## 2025-02-25 ENCOUNTER — OFFICE VISIT (OUTPATIENT)
Dept: PULMONOLOGY | Facility: CLINIC | Age: 60
End: 2025-02-25
Payer: COMMERCIAL

## 2025-02-25 VITALS
DIASTOLIC BLOOD PRESSURE: 80 MMHG | TEMPERATURE: 98 F | SYSTOLIC BLOOD PRESSURE: 133 MMHG | BODY MASS INDEX: 36.7 KG/M2 | OXYGEN SATURATION: 94 % | HEART RATE: 94 BPM | WEIGHT: 286 LBS | HEIGHT: 74 IN

## 2025-02-25 DIAGNOSIS — Z99.89 HISTORY OF CONTINUOUS POSITIVE AIRWAY PRESSURE (CPAP) THERAPY AT HOME: ICD-10-CM

## 2025-02-25 DIAGNOSIS — G47.33 OBSTRUCTIVE SLEEP APNEA: Primary | ICD-10-CM

## 2025-02-25 PROCEDURE — 3008F BODY MASS INDEX DOCD: CPT | Performed by: INTERNAL MEDICINE

## 2025-02-25 PROCEDURE — 99214 OFFICE O/P EST MOD 30 MIN: CPT | Performed by: INTERNAL MEDICINE

## 2025-02-25 PROCEDURE — 3079F DIAST BP 80-89 MM HG: CPT | Performed by: INTERNAL MEDICINE

## 2025-02-25 PROCEDURE — 4010F ACE/ARB THERAPY RXD/TAKEN: CPT | Performed by: INTERNAL MEDICINE

## 2025-02-25 PROCEDURE — 3075F SYST BP GE 130 - 139MM HG: CPT | Performed by: INTERNAL MEDICINE

## 2025-02-26 NOTE — PROGRESS NOTES
"Subjective   Patient ID: Sebastian Kenney \"Tim\" is a 59 y.o. male who presents for No chief complaint on file..  HPI  Patient was seen today in the office on a follow-up visit since that is required that he be seen once a year and his last visit with us was on 12/6/2023.  His DME provider is [employer].  He denies having any problems with the APAP equipment.  His compliance report from 12/24/2025 shows 100% usage over the previous 90 days with an average of 9 hours and 46 minutes per night.  His APAP is at 15/5 cm of water pressure with an EPR of 3.  His AHI is 0.2.  Review of Systems  Patient denies any problems with the equipment or use of it.  Objective   Physical Exam  Patient is not in any respiratory distress today.  He has not related any problems with his sleep at nighttime.  Assessment/Plan        Impressions:  1.  Obstructive sleep apnea syndrome.  2.  APAP therapy at 15/5 cm of water.  Recommendations:  1.  Patient was reminded to obtain future supplies from his DME provider which should be done at least on a yearly basis.  2.  Follow-up with the patient in 1 year.      This note was transcribed using the Dragon Dictation system.  There may be grammatical, punctuation, or verbiage errors that occur with voice recognition programs.    Car Brewster DO 02/26/25 10:38 AM   "

## 2025-03-13 ENCOUNTER — OFFICE VISIT (OUTPATIENT)
Dept: ORTHOPEDIC SURGERY | Facility: CLINIC | Age: 60
End: 2025-03-13
Payer: COMMERCIAL

## 2025-03-13 DIAGNOSIS — M65.311 TRIGGER FINGER OF RIGHT THUMB: ICD-10-CM

## 2025-03-13 DIAGNOSIS — M79.641 BILATERAL HAND PAIN: ICD-10-CM

## 2025-03-13 DIAGNOSIS — M79.642 BILATERAL HAND PAIN: ICD-10-CM

## 2025-03-13 PROCEDURE — 2500000004 HC RX 250 GENERAL PHARMACY W/ HCPCS (ALT 636 FOR OP/ED): Performed by: ORTHOPAEDIC SURGERY

## 2025-03-13 PROCEDURE — 20550 NJX 1 TENDON SHEATH/LIGAMENT: CPT | Mod: 50 | Performed by: ORTHOPAEDIC SURGERY

## 2025-03-13 PROCEDURE — 99213 OFFICE O/P EST LOW 20 MIN: CPT | Mod: 25 | Performed by: ORTHOPAEDIC SURGERY

## 2025-03-13 PROCEDURE — 4010F ACE/ARB THERAPY RXD/TAKEN: CPT | Performed by: ORTHOPAEDIC SURGERY

## 2025-03-13 PROCEDURE — 99214 OFFICE O/P EST MOD 30 MIN: CPT | Performed by: ORTHOPAEDIC SURGERY

## 2025-03-13 RX ORDER — TRIAMCINOLONE ACETONIDE 40 MG/ML
20 INJECTION, SUSPENSION INTRA-ARTICULAR; INTRAMUSCULAR
Status: COMPLETED | OUTPATIENT
Start: 2025-03-13 | End: 2025-03-13

## 2025-03-13 RX ORDER — LIDOCAINE HYDROCHLORIDE 10 MG/ML
0.5 INJECTION, SOLUTION INFILTRATION; PERINEURAL
Status: COMPLETED | OUTPATIENT
Start: 2025-03-13 | End: 2025-03-13

## 2025-03-13 RX ADMIN — TRIAMCINOLONE ACETONIDE 20 MG: 40 INJECTION, SUSPENSION INTRA-ARTICULAR; INTRAMUSCULAR at 11:03

## 2025-03-13 RX ADMIN — LIDOCAINE HYDROCHLORIDE 0.5 ML: 10 INJECTION, SOLUTION INFILTRATION; PERINEURAL at 11:03

## 2025-03-13 NOTE — PROGRESS NOTES
History of Present Illness:  Patient presents today for evaluation of bilateral first, fourth, fifth, finger pain. Endorses catching of the digit with flexion. Denies any traumatic event or injury. The patient notes that it is worse with periods of disuse and in the morning and better when warmed up.  The patient endorses localized, focal pain when it catches / sharp in nature.  The patient denies any numbness and tingling.    He notes that the triggering for the right thumb is the most profound and bothers him on a regular basis and he has significant pain in this location.  Currently works as an  for the [employer], type II diabetic with a pump.    Past Medical History:   Diagnosis Date    Anemia     Angina pectoris     Arrhythmia     a-flutter    C. difficile colitis     dx 7/30/23    Colon polyp     Coronary artery disease     [facility] 7/25/23: % ISR + collat, 1st diag 75%, LCx mid 95%, RCA mid 50%, PDA distal 90%, LVEF 45%    Diabetes mellitus (Multi)     takes insulin and semaglutide/Rybelsus    Hyperlipidemia     Hypertension     Joint pain     ankle, Charcot foot    Old myocardial infarction     History of myocardial infarction    Peripheral neuropathy     Sleep apnea     + CPAP    Type 2 diabetes mellitus      Past Surgical History:   Procedure Laterality Date    CARDIAC CATHETERIZATION      x2    COLONOSCOPY      CORONARY ARTERY BYPASS GRAFT      4 vessel       Current Outpatient Medications:     acetaminophen (Tylenol) 325 mg tablet, Take 2 tablets (650 mg) by mouth every 6 hours if needed (for pain)., Disp: , Rfl: 0    allopurinol (Zyloprim) 300 mg tablet, Take 1 tablet by mouth daily, Disp: 90 tablet, Rfl: 2    atorvastatin (Lipitor) 80 mg tablet, Take 1 tablet (80 mg) by mouth once daily., Disp: 90 tablet, Rfl: 3    clopidogrel (Plavix) 75 mg tablet, TAKE 1 TABLET BY MOUTH ONCE DAILY., Disp: 90 tablet, Rfl: 3    gabapentin (Neurontin) 600 mg tablet, TAKE 1 TABLET (600 MG) BY MOUTH 5  TIMES A DAY., Disp: 150 tablet, Rfl: 2    lisinopril 10 mg tablet, Take 1 tablet (10 mg) by mouth once daily., Disp: 90 tablet, Rfl: 3    multivitamin with minerals tablet, Take 1 tablet by mouth once daily., Disp: , Rfl: 0    NovoLOG U-100 Insulin aspart 100 unit/mL injection, USE VIA INSULIN PUMP, MAX DAILY DOSE 200 UNITS, Disp: , Rfl:     Ozempic 0.25 mg or 0.5 mg (2 mg/3 mL) pen injector, Inject 0.5 mg under the skin every 7 days., Disp: , Rfl:     testosterone (Androgel) 50 mg/5 gram (1 %) gel, Place 1 packet (50 mg) on the skin 2 times a day., Disp: , Rfl:     Review of Systems   GENERAL: Negative for malaise, significant weight loss, fever  MUSCULOSKELETAL: see HPI  NEURO:  Negative    Physical Examination:  Bilateral hand:  No obvious atrophy, no skin changes  Tenderness, palpable nodule along the flexor tendon sheath with passive ROM  Positive triggering with active flexion and extension   Normal neurovascular exam distally    Imaging:  No acute fractures or severe degenerative changes    Assessment:  Patient with bilateral 1st, 4th, and 5th digit trigger finger, most pronounced at the 1st digit.     Plan:  We reviewed the disease process with the patient including the etiology of trigger fingers.    We discussed the role for injections. The majority of patients (60-80%) will achieve symptom relief with corticosteroid injection, although it make take more than one. Patients with diabetes or symptoms for longer than 6 months have a lower chance of successful resolution of symptoms with injections. Patients who are not satisfied with symptom relief from injections may be candidates for trigger finger release.  We discussed surgical intervention reviewing the risks (bleeding, neurologic injury, wound issues including infection, and recurrence) and benefits.  The patient elected for: CSI today to bilateral 1st digit.    Likely referral to hand specialist if worsens for surgical options.     Marcelino Smith,  ALBINO     In a face to face encounter, I evaluated the patient and performed a physical examination, discussed pertinent diagnostic studies if indicated and discussed diagnosis and management strategies with both the patient and physician assistant / nurse practitioner.  I reviewed the PA/NP's note and agree with the documented findings and plan of care.    Hand / UE Inj/Asp: bilateral thumb A1 for trigger finger on 3/13/2025 11:03 AM  Indications: pain  Details: 22 G needle, volar approach  Medications (Right): 20 mg triamcinolone acetonide 40 mg/mL; 0.5 mL lidocaine 10 mg/mL (1 %)  Medications (Left): 20 mg triamcinolone acetonide 40 mg/mL; 0.5 mL lidocaine 10 mg/mL (1 %)  Outcome: tolerated well, no immediate complications  Procedure, treatment alternatives, risks and benefits explained, specific risks discussed. Consent was given by the patient. Immediately prior to procedure a time out was called to verify the correct patient, procedure, equipment, support staff and site/side marked as required. Patient was prepped and draped in the usual sterile fashion.             Gregorio Fox MD

## 2025-03-14 ENCOUNTER — TELEPHONE (OUTPATIENT)
Dept: ENDOCRINOLOGY | Age: 60
End: 2025-03-14

## 2025-03-14 ENCOUNTER — OFFICE VISIT (OUTPATIENT)
Dept: ENDOCRINOLOGY | Age: 60
End: 2025-03-14
Payer: COMMERCIAL

## 2025-03-14 VITALS
DIASTOLIC BLOOD PRESSURE: 74 MMHG | BODY MASS INDEX: 36.06 KG/M2 | WEIGHT: 281 LBS | HEIGHT: 74 IN | SYSTOLIC BLOOD PRESSURE: 117 MMHG | HEART RATE: 80 BPM | OXYGEN SATURATION: 96 %

## 2025-03-14 DIAGNOSIS — E29.1 HYPOGONADISM IN MALE: ICD-10-CM

## 2025-03-14 DIAGNOSIS — E29.1 HYPOGONADISM IN MALE: Primary | ICD-10-CM

## 2025-03-14 DIAGNOSIS — E11.42 DIABETIC POLYNEUROPATHY ASSOCIATED WITH TYPE 2 DIABETES MELLITUS: ICD-10-CM

## 2025-03-14 DIAGNOSIS — E11.42 DIABETIC POLYNEUROPATHY ASSOCIATED WITH TYPE 2 DIABETES MELLITUS (HCC): ICD-10-CM

## 2025-03-14 DIAGNOSIS — Z96.41 INSULIN PUMP IN PLACE: ICD-10-CM

## 2025-03-14 LAB
ANION GAP SERPL CALCULATED.3IONS-SCNC: 12 MEQ/L (ref 9–15)
BUN SERPL-MCNC: 18 MG/DL (ref 6–20)
CALCIUM SERPL-MCNC: 9.2 MG/DL (ref 8.5–9.9)
CHLORIDE SERPL-SCNC: 101 MEQ/L (ref 95–107)
CHOLEST SERPL-MCNC: 143 MG/DL (ref 0–199)
CO2 SERPL-SCNC: 24 MEQ/L (ref 20–31)
CREAT SERPL-MCNC: 0.97 MG/DL (ref 0.7–1.2)
ERYTHROCYTE [DISTWIDTH] IN BLOOD BY AUTOMATED COUNT: 13.7 % (ref 11.5–14.5)
GLUCOSE SERPL-MCNC: 115 MG/DL (ref 70–99)
HCT VFR BLD AUTO: 43.7 % (ref 42–52)
HDLC SERPL-MCNC: 32 MG/DL (ref 40–59)
HGB BLD-MCNC: 15.1 G/DL (ref 14–18)
LDLC SERPL CALC-MCNC: 70 MG/DL (ref 0–129)
MCH RBC QN AUTO: 30.5 PG (ref 27–31.3)
MCHC RBC AUTO-ENTMCNC: 34.6 % (ref 33–37)
MCV RBC AUTO: 88.3 FL (ref 79–92.2)
PLATELET # BLD AUTO: 193 K/UL (ref 130–400)
POTASSIUM SERPL-SCNC: 4.3 MEQ/L (ref 3.4–4.9)
PSA SERPL-MCNC: 1.6 NG/ML (ref 0–4)
RBC # BLD AUTO: 4.95 M/UL (ref 4.7–6.1)
SHBG SERPL-SCNC: 36 NMOL/L (ref 19–76)
SODIUM SERPL-SCNC: 137 MEQ/L (ref 135–144)
TESTOST FREE SERPL-MCNC: 17.1 PG/ML (ref 47–244)
TESTOST SERPL-MCNC: 98 NG/DL (ref 193–740)
TRIGL SERPL-MCNC: 207 MG/DL (ref 0–150)
WBC # BLD AUTO: 10.5 K/UL (ref 4.8–10.8)

## 2025-03-14 PROCEDURE — 3017F COLORECTAL CA SCREEN DOC REV: CPT | Performed by: INTERNAL MEDICINE

## 2025-03-14 PROCEDURE — 3044F HG A1C LEVEL LT 7.0%: CPT | Performed by: INTERNAL MEDICINE

## 2025-03-14 PROCEDURE — 99214 OFFICE O/P EST MOD 30 MIN: CPT | Performed by: INTERNAL MEDICINE

## 2025-03-14 PROCEDURE — 1036F TOBACCO NON-USER: CPT | Performed by: INTERNAL MEDICINE

## 2025-03-14 PROCEDURE — 3078F DIAST BP <80 MM HG: CPT | Performed by: INTERNAL MEDICINE

## 2025-03-14 PROCEDURE — 2022F DILAT RTA XM EVC RTNOPTHY: CPT | Performed by: INTERNAL MEDICINE

## 2025-03-14 PROCEDURE — G8427 DOCREV CUR MEDS BY ELIG CLIN: HCPCS | Performed by: INTERNAL MEDICINE

## 2025-03-14 PROCEDURE — 3074F SYST BP LT 130 MM HG: CPT | Performed by: INTERNAL MEDICINE

## 2025-03-14 PROCEDURE — G8417 CALC BMI ABV UP PARAM F/U: HCPCS | Performed by: INTERNAL MEDICINE

## 2025-03-14 RX ORDER — INSULIN ASPART 100 [IU]/ML
INJECTION, SOLUTION INTRAVENOUS; SUBCUTANEOUS
Qty: 180 ML | Refills: 3 | Status: SHIPPED | OUTPATIENT
Start: 2025-03-14

## 2025-03-14 RX ORDER — SEMAGLUTIDE 1.34 MG/ML
INJECTION, SOLUTION SUBCUTANEOUS
Qty: 3 ADJUSTABLE DOSE PRE-FILLED PEN SYRINGE | Refills: 3 | Status: SHIPPED | OUTPATIENT
Start: 2025-03-14

## 2025-03-14 RX ORDER — TESTOSTERONE ENANTHATE 75 MG/.5ML
INJECTION SUBCUTANEOUS
Qty: 4 ADJUSTABLE DOSE PRE-FILLED PEN SYRINGE | Refills: 3 | Status: SHIPPED | OUTPATIENT
Start: 2025-03-14

## 2025-03-14 RX ORDER — TESTOSTERONE GEL, 1% 10 MG/G
2 GEL TRANSDERMAL DAILY
Qty: 900 G | Refills: 1 | Status: SHIPPED | OUTPATIENT
Start: 2025-03-14 | End: 2025-06-12

## 2025-03-14 NOTE — PROGRESS NOTES
3/14/2025    Assessment:       Diagnosis Orders   1. Hypogonadism in male        2. Diabetic polyneuropathy associated with type 2 diabetes mellitus (HCC)        3. Insulin pump in place              PLAN:       Orders Placed This Encounter   Procedures    Hemoglobin A1C     Standing Status:   Future     Number of Occurrences:   1     Expected Date:   3/14/2025     Expiration Date:   3/14/2026    Basic Metabolic Panel     Standing Status:   Future     Number of Occurrences:   1     Expected Date:   3/14/2025     Expiration Date:   3/14/2026    Lipid Panel     Standing Status:   Future     Number of Occurrences:   1     Expected Date:   3/14/2025     Expiration Date:   3/14/2026    CBC     Standing Status:   Future     Number of Occurrences:   1     Expected Date:   3/14/2025     Expiration Date:   3/14/2026    PSA, Diagnostic     Standing Status:   Future     Number of Occurrences:   1     Expected Date:   3/14/2025     Expiration Date:   3/14/2026    Testosterone, free, total     Standing Status:   Future     Number of Occurrences:   1     Expected Date:   3/14/2025     Expiration Date:   3/14/2026     Orders Placed This Encounter   Medications    insulin aspart (NOVOLOG) 100 UNIT/ML injection vial     Sig: USE VIA INSULIN PUMP, MAX DAILY DOSE 200 UNITS     Dispense:  180 mL     Refill:  3    testosterone (ANDROGEL; TESTIM) 50 MG/5GM (1%) GEL 1% gel     Sig: Apply 2 Tubes topically daily for 90 days.     Dispense:  900 g     Refill:  1    Testosterone Enanthate (XYOSTED) 75 MG/0.5ML SOAJ     Sig: Once a week     Dispense:  4 Adjustable Dose Pre-filled Pen Syringe     Refill:  3    Semaglutide,0.25 or 0.5MG/DOS, (OZEMPIC, 0.25 OR 0.5 MG/DOSE,) 2 MG/1.5ML SOPN     Si.5 mg once a week     Dispense:  3 Adjustable Dose Pre-filled Pen Syringe     Refill:  3   Continue patient on current dose of insulin through pump continue with Ozempic prescription given for testosterone gels and Xyosted reviewed [place]ARRS  More than

## 2025-03-15 LAB
ESTIMATED AVERAGE GLUCOSE: 146 MG/DL
HBA1C MFR BLD: 6.7 % (ref 4–6)

## 2025-03-15 ASSESSMENT — ENCOUNTER SYMPTOMS: EYES NEGATIVE: 1

## 2025-04-03 ENCOUNTER — TELEPHONE (OUTPATIENT)
Age: 60
End: 2025-04-03

## 2025-04-03 NOTE — TELEPHONE ENCOUNTER
Ozempic 0.25 or 0.5 mg prior authorization request submitted online ([email or web address] to Stephanie), clinical uploaded, auth pending.       (Key: [id number])  Ozempic (0.25 or 0.5 MG/DOSE) 2MG/3ML pen-injectors

## 2025-04-08 ENCOUNTER — TELEPHONE (OUTPATIENT)
Age: 60
End: 2025-04-08

## 2025-04-08 NOTE — TELEPHONE ENCOUNTER
[facility] is requesting a PA for TESTOSTERONE 50 MG/5 GRAM GEL      Third party info-[employer]    Cardholder ID-[id number]  Group #[id number]  Relationship-1  Help desk # [phone]

## 2025-04-10 NOTE — TELEPHONE ENCOUNTER
Since we have not received a response from [employer], I submitted authorization request for second time on [email or web address].    (Key: [id number])  Ozempic (0.25 or 0.5 MG/DOSE) 2MG/3ML pen-injectors      4- Response received from [email or web address]:  -Message from Plan  Prior Authorization duplicate/in process

## 2025-04-14 DIAGNOSIS — G62.9 POLYNEUROPATHY, UNSPECIFIED: ICD-10-CM

## 2025-04-15 RX ORDER — GABAPENTIN 600 MG/1
600 TABLET ORAL
Qty: 150 TABLET | Refills: 2 | Status: SHIPPED | OUTPATIENT
Start: 2025-04-15

## 2025-04-15 NOTE — TELEPHONE ENCOUNTER
Rx Refill Request     Name: Sebastian Kenney  :  1965   Medication Name:  gabapentin (Neurontin) 600 mg tablet   Specific Pharmacy location:  [facility]/pharmacy #[id number] [place], [place]   Date of last appointment:  2025   Date of next appointment:  2025   Best number to reach patient:  [phone]

## 2025-05-02 ENCOUNTER — DOCUMENTATION (OUTPATIENT)
Dept: CARDIOLOGY | Facility: HOSPITAL | Age: 60
End: 2025-05-02
Payer: COMMERCIAL

## 2025-05-02 NOTE — PROGRESS NOTES
Spoke with patient via phone today as follow up the [employer] Trial.  Visit completed. Contact verified for next visit in 6 months.

## 2025-05-20 ENCOUNTER — APPOINTMENT (OUTPATIENT)
Dept: PRIMARY CARE | Facility: CLINIC | Age: 60
End: 2025-05-20
Payer: COMMERCIAL

## 2025-05-20 VITALS
HEART RATE: 83 BPM | OXYGEN SATURATION: 98 % | DIASTOLIC BLOOD PRESSURE: 78 MMHG | HEIGHT: 74 IN | WEIGHT: 285.8 LBS | SYSTOLIC BLOOD PRESSURE: 121 MMHG | BODY MASS INDEX: 36.68 KG/M2

## 2025-05-20 DIAGNOSIS — Z79.4 TYPE 2 DIABETES MELLITUS WITH DIABETIC NEUROPATHY, WITH LONG-TERM CURRENT USE OF INSULIN: ICD-10-CM

## 2025-05-20 DIAGNOSIS — E78.2 MIXED HYPERLIPIDEMIA: ICD-10-CM

## 2025-05-20 DIAGNOSIS — I10 ESSENTIAL HYPERTENSION: ICD-10-CM

## 2025-05-20 DIAGNOSIS — M65.311 TRIGGER FINGER OF RIGHT THUMB: ICD-10-CM

## 2025-05-20 DIAGNOSIS — G47.33 OSA (OBSTRUCTIVE SLEEP APNEA): ICD-10-CM

## 2025-05-20 DIAGNOSIS — E66.812 CLASS 2 SEVERE OBESITY DUE TO EXCESS CALORIES WITH SERIOUS COMORBIDITY AND BODY MASS INDEX (BMI) OF 35.0 TO 35.9 IN ADULT: ICD-10-CM

## 2025-05-20 DIAGNOSIS — E66.01 CLASS 2 SEVERE OBESITY DUE TO EXCESS CALORIES WITH SERIOUS COMORBIDITY AND BODY MASS INDEX (BMI) OF 35.0 TO 35.9 IN ADULT: ICD-10-CM

## 2025-05-20 DIAGNOSIS — E11.40 TYPE 2 DIABETES MELLITUS WITH DIABETIC NEUROPATHY, WITH LONG-TERM CURRENT USE OF INSULIN: ICD-10-CM

## 2025-05-20 DIAGNOSIS — I48.91 ATRIAL FIBRILLATION, UNSPECIFIED TYPE (MULTI): ICD-10-CM

## 2025-05-20 DIAGNOSIS — M1A.09X0 IDIOPATHIC CHRONIC GOUT OF MULTIPLE SITES WITHOUT TOPHUS: ICD-10-CM

## 2025-05-20 DIAGNOSIS — I25.709 ATHEROSCLEROSIS OF CORONARY ARTERY BYPASS GRAFT OF NATIVE HEART WITH ANGINA PECTORIS: Primary | ICD-10-CM

## 2025-05-20 ASSESSMENT — ENCOUNTER SYMPTOMS
HEMATURIA: 0
SHORTNESS OF BREATH: 0
FATIGUE: 0
NUMBNESS: 0
ACTIVITY CHANGE: 0
CHEST TIGHTNESS: 0
SORE THROAT: 0
VOMITING: 0
CONSTIPATION: 0
NECK PAIN: 0
DIZZINESS: 0
WEAKNESS: 0
HEADACHES: 0
DIFFICULTY URINATING: 0
BLOOD IN STOOL: 0
BRUISES/BLEEDS EASILY: 0
MYALGIAS: 0
EYE DISCHARGE: 0
ARTHRALGIAS: 0
DYSPHORIC MOOD: 0
ABDOMINAL PAIN: 0
NAUSEA: 0
ADENOPATHY: 0
BACK PAIN: 0
NERVOUS/ANXIOUS: 0
COUGH: 0
DIARRHEA: 0

## 2025-05-20 NOTE — PROGRESS NOTES
"Subjective   Patient ID: Sebastian Kenney \"Tim\" is a 60 y.o. male who presents for Follow-up.  HPI  Coronary disease stable  No angina  Keep cardiac follow-up for this and atrial fibrillation    Hypertension with good control tolerates medicine no new or worsening issues    High cholesterol ongoing  Watch diet follow blood work    Obese recommend weight loss and diet    Diabetes with excellent control  Watch diet follow blood work  Keep Endo follow-up as well  Stay current with proper diabetic health maintenance and eye exam    Gout stable  Low purine diet    Sleep apnea ongoing  Recommend proper CPAP treatment    Trigger finger of the thumb daughter had injections which are helpful  Review of Systems   Constitutional:  Negative for activity change and fatigue.   HENT:  Negative for congestion and sore throat.    Eyes:  Negative for discharge.   Respiratory:  Negative for cough, chest tightness and shortness of breath.    Cardiovascular:  Negative for chest pain and leg swelling.   Gastrointestinal:  Negative for abdominal pain, blood in stool, constipation, diarrhea, nausea and vomiting.   Endocrine: Negative for cold intolerance and heat intolerance.   Genitourinary:  Negative for difficulty urinating and hematuria.   Musculoskeletal:  Negative for arthralgias, back pain, gait problem, myalgias and neck pain.   Allergic/Immunologic: Negative for environmental allergies.   Neurological:  Negative for dizziness, syncope, weakness, numbness and headaches.   Hematological:  Negative for adenopathy. Does not bruise/bleed easily.   Psychiatric/Behavioral:  Negative for dysphoric mood. The patient is not nervous/anxious.    All other systems reviewed and are negative.      Objective   /78 (BP Location: Right arm, BP Cuff Size: Large adult)   Pulse 83   Ht 1.88 m (6' 2\")   Wt 130 kg (285 lb 12.8 oz)   SpO2 98%   BMI 36.69 kg/m²    Physical Exam  Vitals and nursing note reviewed.   Constitutional:       General: " He is not in acute distress.     Appearance: Normal appearance. He is obese.   HENT:      Head: Normocephalic and atraumatic.      Right Ear: Tympanic membrane, ear canal and external ear normal.      Left Ear: Tympanic membrane, ear canal and external ear normal.      Nose: Nose normal.      Mouth/Throat:      Mouth: Mucous membranes are moist.      Pharynx: Oropharynx is clear. No oropharyngeal exudate or posterior oropharyngeal erythema.   Eyes:      Extraocular Movements: Extraocular movements intact.      Conjunctiva/sclera: Conjunctivae normal.      Pupils: Pupils are equal, round, and reactive to light.   Cardiovascular:      Rate and Rhythm: Normal rate and regular rhythm.      Pulses: Normal pulses.      Heart sounds: Normal heart sounds. No murmur heard.  Pulmonary:      Effort: Pulmonary effort is normal. No respiratory distress.      Breath sounds: Normal breath sounds. No wheezing or rales.   Abdominal:      General: Abdomen is flat. Bowel sounds are normal. There is no distension.      Palpations: Abdomen is soft. There is no mass.      Tenderness: There is no abdominal tenderness.   Musculoskeletal:         General: No swelling or deformity. Normal range of motion.      Cervical back: Normal range of motion and neck supple.      Right lower leg: No edema.      Left lower leg: No edema.   Lymphadenopathy:      Cervical: No cervical adenopathy.   Skin:     General: Skin is warm and dry.      Capillary Refill: Capillary refill takes less than 2 seconds.      Findings: No lesion or rash.   Neurological:      General: No focal deficit present.      Mental Status: He is alert and oriented to person, place, and time.      Cranial Nerves: No cranial nerve deficit.      Motor: No weakness.   Psychiatric:         Mood and Affect: Mood normal.         Behavior: Behavior normal.         Thought Content: Thought content normal.         Judgment: Judgment normal.         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  Stay current with age appropriate health maintenance as instructed.  Appointment here or ER with new or worsening symptoms'  Keep appropriate follow-up visit.  Stay current with proper immunizations   3 months  Weight loss and diet  Keep specialist follow-up  3-month recheck

## 2025-06-17 ENCOUNTER — OFFICE VISIT (OUTPATIENT)
Age: 60
End: 2025-06-17
Payer: COMMERCIAL

## 2025-06-17 VITALS
HEIGHT: 74 IN | WEIGHT: 279.98 LBS | SYSTOLIC BLOOD PRESSURE: 102 MMHG | HEART RATE: 98 BPM | BODY MASS INDEX: 35.93 KG/M2 | DIASTOLIC BLOOD PRESSURE: 68 MMHG

## 2025-06-17 DIAGNOSIS — Z96.41 INSULIN PUMP IN PLACE: ICD-10-CM

## 2025-06-17 DIAGNOSIS — E29.1 HYPOGONADISM IN MALE: Primary | ICD-10-CM

## 2025-06-17 DIAGNOSIS — E11.42 DIABETIC POLYNEUROPATHY ASSOCIATED WITH TYPE 2 DIABETES MELLITUS (HCC): ICD-10-CM

## 2025-06-17 LAB
CHP ED QC CHECK: ABNORMAL
GLUCOSE BLD-MCNC: 168 MG/DL
HBA1C MFR BLD: 7.3 %

## 2025-06-17 PROCEDURE — 83036 HEMOGLOBIN GLYCOSYLATED A1C: CPT | Performed by: INTERNAL MEDICINE

## 2025-06-17 PROCEDURE — 99214 OFFICE O/P EST MOD 30 MIN: CPT | Performed by: INTERNAL MEDICINE

## 2025-06-17 PROCEDURE — 3074F SYST BP LT 130 MM HG: CPT | Performed by: INTERNAL MEDICINE

## 2025-06-17 PROCEDURE — 1036F TOBACCO NON-USER: CPT | Performed by: INTERNAL MEDICINE

## 2025-06-17 PROCEDURE — 82962 GLUCOSE BLOOD TEST: CPT | Performed by: INTERNAL MEDICINE

## 2025-06-17 PROCEDURE — G8427 DOCREV CUR MEDS BY ELIG CLIN: HCPCS | Performed by: INTERNAL MEDICINE

## 2025-06-17 PROCEDURE — G8417 CALC BMI ABV UP PARAM F/U: HCPCS | Performed by: INTERNAL MEDICINE

## 2025-06-17 PROCEDURE — 3078F DIAST BP <80 MM HG: CPT | Performed by: INTERNAL MEDICINE

## 2025-06-17 PROCEDURE — 3051F HG A1C>EQUAL 7.0%<8.0%: CPT | Performed by: INTERNAL MEDICINE

## 2025-06-17 PROCEDURE — 2022F DILAT RTA XM EVC RTNOPTHY: CPT | Performed by: INTERNAL MEDICINE

## 2025-06-17 PROCEDURE — 3017F COLORECTAL CA SCREEN DOC REV: CPT | Performed by: INTERNAL MEDICINE

## 2025-06-17 RX ORDER — TESTOSTERONE GEL, 1% 10 MG/G
2 GEL TRANSDERMAL DAILY
Qty: 900 G | Refills: 1 | Status: SHIPPED | OUTPATIENT
Start: 2025-06-17 | End: 2025-09-15

## 2025-06-17 RX ORDER — INSULIN ASPART 100 [IU]/ML
INJECTION, SOLUTION INTRAVENOUS; SUBCUTANEOUS
Qty: 180 ML | Refills: 3 | Status: SHIPPED | OUTPATIENT
Start: 2025-06-17

## 2025-06-17 NOTE — PROGRESS NOTES
6/17/2025    Assessment:       Diagnosis Orders   1. Hypogonadism in male  CBC    Testosterone, free, total    testosterone (ANDROGEL; TESTIM) 50 MG/5GM (1%) GEL 1% gel      2. Diabetic polyneuropathy associated with type 2 diabetes mellitus (HCC)  POCT Glucose    POCT glycosylated hemoglobin (Hb A1C)    Basic Metabolic Panel    Hemoglobin A1C    insulin aspart (NOVOLOG) 100 UNIT/ML injection vial      3. Insulin pump in place                PLAN:     Orders Placed This Encounter   Procedures    Basic Metabolic Panel     Standing Status:   Future     Expected Date:   6/17/2025     Expiration Date:   6/17/2026    Hemoglobin A1C     Standing Status:   Future     Expected Date:   6/17/2025     Expiration Date:   6/17/2026    CBC     Standing Status:   Future     Expected Date:   6/17/2025     Expiration Date:   6/17/2026    Testosterone, free, total     Standing Status:   Future     Expected Date:   6/17/2025     Expiration Date:   6/17/2026    POCT Glucose    POCT glycosylated hemoglobin (Hb A1C)     Orders Placed This Encounter   Medications    testosterone (ANDROGEL; TESTIM) 50 MG/5GM (1%) GEL 1% gel     Sig: Apply 2 g topically daily for 90 days.     Dispense:  900 g     Refill:  1    insulin aspart (NOVOLOG) 100 UNIT/ML injection vial     Sig: USE VIA INSULIN PUMP, MAX DAILY DOSE 200 UNITS     Dispense:  180 mL     Refill:  3     Continue current dose of NovoLog via pump continue testosterone replacement A1c goal of less than 7 more than 50% of 30 minutes spent in patient education counseling      Orders Placed This Encounter   Procedures    POCT Glucose    POCT glycosylated hemoglobin (Hb A1C)     No orders of the defined types were placed in this encounter.    No follow-ups on file.  Subjective:     Chief Complaint   Patient presents with    Hypogonadism    Diabetes     Insulin pump in place     Vitals:    06/17/25 1053   BP: 102/68   Pulse: 98   Weight: 127 kg (279 lb 15.8 oz)   Height: 1.88 m (6' 2.02\")     Wt

## 2025-06-19 ASSESSMENT — ENCOUNTER SYMPTOMS: EYES NEGATIVE: 1

## 2025-07-16 ENCOUNTER — APPOINTMENT (OUTPATIENT)
Dept: CARDIOLOGY | Facility: CLINIC | Age: 60
End: 2025-07-16
Payer: COMMERCIAL

## 2025-07-16 VITALS
HEART RATE: 72 BPM | DIASTOLIC BLOOD PRESSURE: 82 MMHG | HEIGHT: 74 IN | SYSTOLIC BLOOD PRESSURE: 130 MMHG | WEIGHT: 284.4 LBS | BODY MASS INDEX: 36.5 KG/M2

## 2025-07-16 DIAGNOSIS — I10 ESSENTIAL HYPERTENSION: ICD-10-CM

## 2025-07-16 DIAGNOSIS — I25.709 ATHEROSCLEROSIS OF CORONARY ARTERY BYPASS GRAFT OF NATIVE HEART WITH ANGINA PECTORIS: ICD-10-CM

## 2025-07-16 DIAGNOSIS — I25.2 OLD MI (MYOCARDIAL INFARCTION): ICD-10-CM

## 2025-07-16 DIAGNOSIS — Z95.1 S/P CABG (CORONARY ARTERY BYPASS GRAFT): ICD-10-CM

## 2025-07-16 DIAGNOSIS — Z82.49 FAMILY HISTORY OF ISCHEMIC HEART DISEASE: ICD-10-CM

## 2025-07-16 DIAGNOSIS — E78.2 MIXED HYPERLIPIDEMIA: ICD-10-CM

## 2025-07-16 DIAGNOSIS — E11.40 TYPE 2 DIABETES MELLITUS WITH DIABETIC NEUROPATHY, WITH LONG-TERM CURRENT USE OF INSULIN: ICD-10-CM

## 2025-07-16 DIAGNOSIS — Z78.9 NEVER SMOKED CIGARETTES: ICD-10-CM

## 2025-07-16 DIAGNOSIS — Z79.4 TYPE 2 DIABETES MELLITUS WITH DIABETIC NEUROPATHY, WITH LONG-TERM CURRENT USE OF INSULIN: ICD-10-CM

## 2025-07-16 DIAGNOSIS — M14.671 CHARCOT'S JOINT OF RIGHT FOOT: ICD-10-CM

## 2025-07-16 PROBLEM — E66.812 CLASS 2 SEVERE OBESITY DUE TO EXCESS CALORIES WITH SERIOUS COMORBIDITY AND BODY MASS INDEX (BMI) OF 35.0 TO 35.9 IN ADULT: Status: RESOLVED | Noted: 2023-02-12 | Resolved: 2025-07-16

## 2025-07-16 PROBLEM — E66.01 CLASS 2 SEVERE OBESITY DUE TO EXCESS CALORIES WITH SERIOUS COMORBIDITY AND BODY MASS INDEX (BMI) OF 35.0 TO 35.9 IN ADULT: Status: RESOLVED | Noted: 2023-02-12 | Resolved: 2025-07-16

## 2025-07-16 PROCEDURE — 3008F BODY MASS INDEX DOCD: CPT | Performed by: INTERNAL MEDICINE

## 2025-07-16 PROCEDURE — 1036F TOBACCO NON-USER: CPT | Performed by: INTERNAL MEDICINE

## 2025-07-16 PROCEDURE — 3075F SYST BP GE 130 - 139MM HG: CPT | Performed by: INTERNAL MEDICINE

## 2025-07-16 PROCEDURE — 99214 OFFICE O/P EST MOD 30 MIN: CPT | Performed by: INTERNAL MEDICINE

## 2025-07-16 PROCEDURE — 3079F DIAST BP 80-89 MM HG: CPT | Performed by: INTERNAL MEDICINE

## 2025-07-16 PROCEDURE — 4010F ACE/ARB THERAPY RXD/TAKEN: CPT | Performed by: INTERNAL MEDICINE

## 2025-07-16 ASSESSMENT — PATIENT HEALTH QUESTIONNAIRE - PHQ9
2. FEELING DOWN, DEPRESSED OR HOPELESS: NOT AT ALL
SUM OF ALL RESPONSES TO PHQ9 QUESTIONS 1 AND 2: 0
1. LITTLE INTEREST OR PLEASURE IN DOING THINGS: NOT AT ALL

## 2025-07-16 ASSESSMENT — COLUMBIA-SUICIDE SEVERITY RATING SCALE - C-SSRS
2. HAVE YOU ACTUALLY HAD ANY THOUGHTS OF KILLING YOURSELF?: NO
1. IN THE PAST MONTH, HAVE YOU WISHED YOU WERE DEAD OR WISHED YOU COULD GO TO SLEEP AND NOT WAKE UP?: NO
6. HAVE YOU EVER DONE ANYTHING, STARTED TO DO ANYTHING, OR PREPARED TO DO ANYTHING TO END YOUR LIFE?: NO

## 2025-07-16 NOTE — PATIENT INSTRUCTIONS
Patient to follow up in 1 year with Dr. Yoni Hernandez MD      No changes today.   Continue same medications and treatments.   Patient educated on proper medication use.   Patient educated on risk factor modification.   Please bring any lab results from other providers / physicians to your next appointment.     Please bring all medicines, vitamins, and herbal supplements with you when you come to the office.     Prescriptions will not be filled unless you are compliant with your follow up appointments or have a follow up appointment scheduled as per instruction of your physician. Refills should be requested at the time of your visit.    I, Nilesh Bailey RN am scribing for and in the presence of Dr. Yoni Phillips MD

## 2025-07-16 NOTE — PROGRESS NOTES
CARDIOLOGY OFFICE VISIT      CHIEF COMPLAINT  Chief Complaint   Patient presents with    Follow-up     1 year follow up on essential hypertension, mixed hyperlipidemia, and atherosclerosis of coronary bypass graft        HISTORY OF PRESENT ILLNESS  The patient states he has been feeling well.  He denies chest discomfort or symptoms of myocardial ischemia.  He denies dyspnea exertion.  He denies palpitations and syncope.  He denies pretibial edema.  He is not having any problem with his current medications.  His most recent lipid profile demonstrated cholesterol 143, triglycerides 207, HDL 32, LDL 70.  I did discuss these results with him.    Impression:  Coronary artery disease, no angina  CABG x4, LIMA to LAD, DINORAH free graft to OM circumflex, radial to diagonal, and SVG to PDA on 10/6/2023 at [facility]  Remote myocardial infarction  Hypertension  Hyperlipidemia  Diabetes mellitus type II  Positive family history of coronary disease  Obesity  Charcot foot, left foot     Please excuse any errors in grammar or translation related to this dictation. Voice recognition software was utilized to prepare this document.    Past Medical History  Medical History[1]    Social History  Social History[2]    Family History   Family History[3]     Allergies:  RX Allergies[4]     Outpatient Medications:  Current Outpatient Medications   Medication Instructions    acetaminophen (TYLENOL) 650 mg, oral, Every 6 hours PRN    allopurinol (Zyloprim) 300 mg tablet Take 1 tablet by mouth daily    atorvastatin (LIPITOR) 80 mg, oral, Daily    clopidogrel (PLAVIX) 75 mg, oral, Daily    gabapentin (NEURONTIN) 600 mg, oral, 5 times daily    lisinopril 10 mg, oral, Daily    multivitamin with minerals tablet 1 tablet, oral, Daily    NovoLOG U-100 Insulin aspart 100 unit/mL injection USE VIA INSULIN PUMP, MAX DAILY DOSE 200 UNITS    Ozempic 0.5 mg, Every 7 days    testosterone (ANDROGEL) 50 mg, 2 times daily          REVIEW OF SYSTEMS  Review of  Systems   All other systems reviewed and are negative.        VITALS  Vitals:    07/16/25 1334   BP: 130/82   Pulse: 72       PHYSICAL EXAM  Vitals reviewed.   Constitutional:       Appearance: Normal and healthy appearance. Well-developed and not in distress.   Eyes:      Conjunctiva/sclera: Conjunctivae normal.      Pupils: Pupils are equal, round, and reactive to light.   Neck:      Vascular: No JVR. JVD normal.   Pulmonary:      Effort: Pulmonary effort is normal.      Breath sounds: Normal breath sounds. No wheezing. No rhonchi. No rales.   Chest:      Chest wall: Not tender to palpatation.   Cardiovascular:      PMI at left midclavicular line. Normal rate. Regular rhythm. Normal S1. Normal S2.       Murmurs: There is no murmur.      No gallop.  No click. No rub.   Pulses:     Intact distal pulses.   Edema:     Peripheral edema absent.   Abdominal:      Tenderness: There is no abdominal tenderness.   Musculoskeletal: Normal range of motion.         General: No tenderness.      Cervical back: Normal range of motion. Skin:     General: Skin is warm and dry.   Neurological:      General: No focal deficit present.      Mental Status: Alert and oriented to person, place and time.   Psychiatric:         Behavior: Behavior is cooperative.         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 Yoni Hernandez MD  .    I, Dr. Yoni Hernandez MD  , personally performed the services described in the documentation as  scribed by Nilesh Bailey RN   in my presence, and confirm it is both accurate and complete.      Dr. Yoni Triana MD  Thank you for allowing me to participate in the care of this patient. Please do not hesitate to contact me with any further questions or concerns.         [1]   Past Medical History:  Diagnosis Date    Anemia     Angina pectoris     Arrhythmia     a-flutter    C. difficile colitis     dx 7/30/23    Colon polyp     Coronary artery disease     [facility] 7/25/23: % ISR + collat, 1st diag 75%, LCx mid 95%, RCA mid 50%, PDA distal 90%, LVEF 45%    Diabetes mellitus (Multi)     takes insulin and semaglutide/Rybelsus    Fractures 3/2022    Foot Navicular Bone    Hyperlipidemia     Hypertension     Joint pain     ankle, Charcot foot    Old myocardial infarction     History of myocardial infarction    Peripheral neuropathy     Sleep apnea     + CPAP    Type 2 diabetes mellitus    [2]   Social History  Tobacco Use    Smoking status: Never     Passive exposure: Never    Smokeless tobacco: Never   Vaping Use    Vaping status: Never Used   Substance Use Topics    Alcohol use: Yes     Comment: rarely    Drug use: Never   [3]   Family History  Problem Relation Name Age of Onset    Other (Cardiac Disorder) Mother Yue HUMAIRA Kenney     Breast cancer Mother Yue HUMAIRA Kenney     Cancer Mother Yuedontae Kenney     Cancer Father Chad L. Pablito    [4]   Allergies  Allergen Reactions    Iodinated Contrast Media Hives     IV Contrast Dye

## 2025-07-19 DIAGNOSIS — G62.9 POLYNEUROPATHY, UNSPECIFIED: ICD-10-CM

## 2025-07-21 NOTE — TELEPHONE ENCOUNTER
Rx Refill Request     Name: Sebastian Kenney  :  1965   Medication Name:  gabapentin (Neurontin) 600 mg tablet   Specific Pharmacy location:  [facility]/pharmacy #[id number] [place], [place]   Date of last appointment:  2025   Date of next appointment:  2025   Best number to reach patient:  [phone]

## 2025-07-22 RX ORDER — GABAPENTIN 600 MG/1
600 TABLET ORAL
Qty: 150 TABLET | Refills: 0 | Status: SHIPPED | OUTPATIENT
Start: 2025-07-22

## 2025-07-25 DIAGNOSIS — I10 ESSENTIAL HYPERTENSION: ICD-10-CM

## 2025-07-25 RX ORDER — LISINOPRIL 10 MG/1
10 TABLET ORAL DAILY
Qty: 90 TABLET | Refills: 3 | Status: SHIPPED | OUTPATIENT
Start: 2025-07-25 | End: 2026-07-25

## 2025-07-25 NOTE — TELEPHONE ENCOUNTER
Received request for prescription refills for patient.   Patient follows with Dr. Phillips    Request is for lisinopril  Is patient currently on medication yes    Last OV 7/16/2025  Next OV 7/15/2026    Pended for signing and sent to provider

## 2025-08-12 DIAGNOSIS — G62.9 POLYNEUROPATHY, UNSPECIFIED: ICD-10-CM

## 2025-08-12 RX ORDER — GABAPENTIN 600 MG/1
600 TABLET ORAL
Qty: 150 TABLET | Refills: 0 | Status: SHIPPED | OUTPATIENT
Start: 2025-08-12

## 2025-08-21 ENCOUNTER — APPOINTMENT (OUTPATIENT)
Dept: PRIMARY CARE | Facility: CLINIC | Age: 60
End: 2025-08-21
Payer: COMMERCIAL

## 2025-08-21 VITALS
HEART RATE: 85 BPM | SYSTOLIC BLOOD PRESSURE: 121 MMHG | DIASTOLIC BLOOD PRESSURE: 77 MMHG | HEIGHT: 74 IN | OXYGEN SATURATION: 96 % | WEIGHT: 282 LBS | BODY MASS INDEX: 36.19 KG/M2

## 2025-08-21 DIAGNOSIS — E11.40 TYPE 2 DIABETES MELLITUS WITH DIABETIC NEUROPATHY, WITH LONG-TERM CURRENT USE OF INSULIN: ICD-10-CM

## 2025-08-21 DIAGNOSIS — I48.91 ATRIAL FIBRILLATION, UNSPECIFIED TYPE (MULTI): ICD-10-CM

## 2025-08-21 DIAGNOSIS — E78.2 MIXED HYPERLIPIDEMIA: ICD-10-CM

## 2025-08-21 DIAGNOSIS — I25.709 ATHEROSCLEROSIS OF CORONARY ARTERY BYPASS GRAFT OF NATIVE HEART WITH ANGINA PECTORIS: Primary | ICD-10-CM

## 2025-08-21 DIAGNOSIS — M1A.09X0 IDIOPATHIC CHRONIC GOUT OF MULTIPLE SITES WITHOUT TOPHUS: ICD-10-CM

## 2025-08-21 DIAGNOSIS — Z79.4 TYPE 2 DIABETES MELLITUS WITH DIABETIC NEUROPATHY, WITH LONG-TERM CURRENT USE OF INSULIN: ICD-10-CM

## 2025-08-21 DIAGNOSIS — I10 ESSENTIAL HYPERTENSION: ICD-10-CM

## 2025-08-21 PROCEDURE — 1036F TOBACCO NON-USER: CPT | Performed by: FAMILY MEDICINE

## 2025-08-21 PROCEDURE — 3078F DIAST BP <80 MM HG: CPT | Performed by: FAMILY MEDICINE

## 2025-08-21 PROCEDURE — 4010F ACE/ARB THERAPY RXD/TAKEN: CPT | Performed by: FAMILY MEDICINE

## 2025-08-21 PROCEDURE — 99214 OFFICE O/P EST MOD 30 MIN: CPT | Performed by: FAMILY MEDICINE

## 2025-08-21 PROCEDURE — 3074F SYST BP LT 130 MM HG: CPT | Performed by: FAMILY MEDICINE

## 2025-08-21 PROCEDURE — 3008F BODY MASS INDEX DOCD: CPT | Performed by: FAMILY MEDICINE

## 2025-08-21 ASSESSMENT — ENCOUNTER SYMPTOMS
HEMATURIA: 0
ACTIVITY CHANGE: 0
CONSTIPATION: 0
VOMITING: 0
NERVOUS/ANXIOUS: 0
DIZZINESS: 0
BACK PAIN: 0
DIFFICULTY URINATING: 0
DYSPHORIC MOOD: 0
SHORTNESS OF BREATH: 0
BRUISES/BLEEDS EASILY: 0
WEAKNESS: 0
MYALGIAS: 0
BLOOD IN STOOL: 0
FATIGUE: 0
COUGH: 0
HEADACHES: 0
SORE THROAT: 0
ABDOMINAL PAIN: 0
NAUSEA: 0
EYE DISCHARGE: 0
NUMBNESS: 0
ARTHRALGIAS: 0
DIARRHEA: 0
ADENOPATHY: 0
NECK PAIN: 0
CHEST TIGHTNESS: 0

## 2025-08-22 LAB
ALBUMIN SERPL-MCNC: 4.5 G/DL (ref 3.6–5.1)
ALP SERPL-CCNC: 86 U/L (ref 35–144)
ALT SERPL-CCNC: 40 U/L (ref 9–46)
ANION GAP SERPL CALCULATED.4IONS-SCNC: 12 MMOL/L (CALC) (ref 7–17)
AST SERPL-CCNC: 28 U/L (ref 10–35)
BASOPHILS # BLD AUTO: 53 CELLS/UL (ref 0–200)
BASOPHILS NFR BLD AUTO: 0.8 %
BILIRUB SERPL-MCNC: 0.4 MG/DL (ref 0.2–1.2)
BUN SERPL-MCNC: 15 MG/DL (ref 7–25)
CALCIUM SERPL-MCNC: 9.2 MG/DL (ref 8.6–10.3)
CHLORIDE SERPL-SCNC: 104 MMOL/L (ref 98–110)
CHOLEST SERPL-MCNC: 157 MG/DL
CHOLEST/HDLC SERPL: 6 (CALC)
CO2 SERPL-SCNC: 24 MMOL/L (ref 20–32)
CREAT SERPL-MCNC: 0.89 MG/DL (ref 0.7–1.3)
EGFRCR SERPLBLD CKD-EPI 2021: 99 ML/MIN/1.73M2
EOSINOPHIL # BLD AUTO: 79 CELLS/UL (ref 15–500)
EOSINOPHIL NFR BLD AUTO: 1.2 %
ERYTHROCYTE [DISTWIDTH] IN BLOOD BY AUTOMATED COUNT: 13.3 % (ref 11–15)
EST. AVERAGE GLUCOSE BLD GHB EST-MCNC: 169 MG/DL
EST. AVERAGE GLUCOSE BLD GHB EST-SCNC: 9.3 MMOL/L
GLUCOSE SERPL-MCNC: 144 MG/DL (ref 65–99)
HBA1C MFR BLD: 7.5 %
HCT VFR BLD AUTO: 41.3 % (ref 38.5–50)
HDLC SERPL-MCNC: 26 MG/DL
HGB BLD-MCNC: 14.3 G/DL (ref 13.2–17.1)
LDLC SERPL CALC-MCNC: ABNORMAL MG/DL
LYMPHOCYTES # BLD AUTO: 1643 CELLS/UL (ref 850–3900)
LYMPHOCYTES NFR BLD AUTO: 24.9 %
MCH RBC QN AUTO: 30.8 PG (ref 27–33)
MCHC RBC AUTO-ENTMCNC: 34.6 G/DL (ref 32–36)
MCV RBC AUTO: 89 FL (ref 80–100)
MONOCYTES # BLD AUTO: 449 CELLS/UL (ref 200–950)
MONOCYTES NFR BLD AUTO: 6.8 %
NEUTROPHILS # BLD AUTO: 4376 CELLS/UL (ref 1500–7800)
NEUTROPHILS NFR BLD AUTO: 66.3 %
NONHDLC SERPL-MCNC: 131 MG/DL (CALC)
PLATELET # BLD AUTO: 148 THOUSAND/UL (ref 140–400)
PMV BLD REES-ECKER: 11.3 FL (ref 7.5–12.5)
POTASSIUM SERPL-SCNC: 4 MMOL/L (ref 3.5–5.3)
PROT SERPL-MCNC: 7.5 G/DL (ref 6.1–8.1)
RBC # BLD AUTO: 4.64 MILLION/UL (ref 4.2–5.8)
SODIUM SERPL-SCNC: 140 MMOL/L (ref 135–146)
TRIGL SERPL-MCNC: 545 MG/DL
WBC # BLD AUTO: 6.6 THOUSAND/UL (ref 3.8–10.8)

## 2025-09-03 ENCOUNTER — APPOINTMENT (OUTPATIENT)
Dept: ORTHOPEDIC SURGERY | Facility: CLINIC | Age: 60
End: 2025-09-03
Payer: COMMERCIAL

## 2025-09-11 ENCOUNTER — APPOINTMENT (OUTPATIENT)
Dept: ORTHOPEDIC SURGERY | Facility: CLINIC | Age: 60
End: 2025-09-11
Payer: COMMERCIAL

## 2025-11-14 ENCOUNTER — APPOINTMENT (OUTPATIENT)
Dept: PRIMARY CARE | Facility: CLINIC | Age: 60
End: 2025-11-14
Payer: COMMERCIAL

## 2026-02-26 ENCOUNTER — APPOINTMENT (OUTPATIENT)
Dept: PULMONOLOGY | Facility: CLINIC | Age: 61
End: 2026-02-26
Payer: COMMERCIAL

## 2026-07-15 ENCOUNTER — APPOINTMENT (OUTPATIENT)
Dept: CARDIOLOGY | Facility: CLINIC | Age: 61
End: 2026-07-15
Payer: COMMERCIAL

## (undated) DEVICE — FILTER, IV, BLOOD, MICROAGGREGATE, 40 MIC, RBC TRANSFUSION

## (undated) DEVICE — DRESSING, ADHESIVE, ISLAND, TELFA, 4 X 14 IN

## (undated) DEVICE — DRAPE, SHEET, UTILITY, NON ABSORBENT, 18 X 26 IN, LF

## (undated) DEVICE — DRESSING, ADHESIVE, ISLAND, TELFA, 2 X 3.75 IN, LF

## (undated) DEVICE — Device

## (undated) DEVICE — SYRINGE, 20 CC, LUER LOCK, MONOJECT, W/O CAP, LF

## (undated) DEVICE — OXYGENATOR FX 25, W/HR, ARTERIAL FILTER

## (undated) DEVICE — MICROCOAGULATION TEST, ACT+ TEST CUVETTE

## (undated) DEVICE — MARKER, SKIN, RULER AND LABEL PACK, CUSTOM

## (undated) DEVICE — SYRINGE, 60 CC, IRRIGATION, BULB, CONTRO-BULB, PAPER POUCH

## (undated) DEVICE — MONITORING KIT, TRANSDUCER, RETROGRADE, MPS, W/EXTENSION, LF

## (undated) DEVICE — TUBING PACK, OXYGENATOR, ADULT

## (undated) DEVICE — DEVICE, ENDOSCOPIC VESSEL HARVESTING, SAPHENA VENAPAX

## (undated) DEVICE — CLIPPER, SURGICAL BLADE ASSEMBLY, GENERAL PURPOSE, SINGLE USE

## (undated) DEVICE — SUTURE, PROLENE, 6-0, 30 IN, C-1, CV-11, BLUE

## (undated) DEVICE — BONE WAX, 2.5G ABSORBABLE, OSTENE

## (undated) DEVICE — SPONGE, LAP, XRAY DECT, 18IN X 18IN, W/MASTER DMT, STERILE

## (undated) DEVICE — WASH SET, XTRA, 125ML

## (undated) DEVICE — CLEANER, ELECTROSURGICAL, TIP, 5 X 5 CM, LF

## (undated) DEVICE — SUTURE, PROLENE, 7-0, 30 IN, BV1, DA, BLUE

## (undated) DEVICE — KIT, COLLECTION, CARDIO

## (undated) DEVICE — FLOSEAL, MATRIX, HEMOSTATIC, FULL STERILE PREP, 5ML

## (undated) DEVICE — CATHETER, THORACIC, STRAIGHT, ADULT, 28 FR, PVC

## (undated) DEVICE — ATS SUCTION LINE

## (undated) DEVICE — BATTERY, VARISPEED

## (undated) DEVICE — DRESSING, MEPILEX, BORDER, SACRUM, 8.7 X 9.8 IN

## (undated) DEVICE — DRAPE, FLUID WARMER

## (undated) DEVICE — DRESSING, ISLAND, TELFA, 4 X 5 IN

## (undated) DEVICE — PACING CABLE, EXTENSION, 12 FT BLUE, DISPOSABLE

## (undated) DEVICE — COVER, CART, 45 X 27 X 48 IN, CLEAR

## (undated) DEVICE — DRESSING, MEPILEX, BORDER, HEEL, 8.7 X 9.1 IN

## (undated) DEVICE — KIT, CELL SAVER, W/COLLECTION SET, 225ML WASH SET

## (undated) DEVICE — DRESSING, QUICKCLOT, HEMOSTATIC, 5 X 5

## (undated) DEVICE — BANDAGE, ELASTIC, ACE, ACE, DOUBLE LENGTH, 6 X 550 IN, LF

## (undated) DEVICE — PERFUSION SERVICES

## (undated) DEVICE — SUTURE, SURGICAL STEEL, STERNUM 7, 18 IN, KV40, SINGL-WIRE

## (undated) DEVICE — CANNULA, ARTERIAL, ONE PIECE, ELONGATED, VENTED, STRAIGHT, ADULT, 22 FR X 30.5 CM

## (undated) DEVICE — CATHETER, DRAINAGE, NASOGASTRIC, DOUBLE LUMEN, FUNNEL END, SUMP, SALEM, 18 FR, 48 IN, PVC, STERILE

## (undated) DEVICE — GOWN, SURGICAL, SMARTGOWN, XLARGE, STERILE

## (undated) DEVICE — TRAY, MINOR, SINGLE BASIN, STERILE

## (undated) DEVICE — LEAD WIRE, PACING, BIPOLAR, LOW PROFILE

## (undated) DEVICE — WASH SET, XTRA, 225ML

## (undated) DEVICE — DRAPE, SHEET, CARDIOVASCULAR, ANTIMICROBIAL, W/ANESTHESIA SCREEN, IOBAN 2, STERI DRAPE, 107 X 133 IN, DISPOSABLE, FABRIC, BLUE, STERILE

## (undated) DEVICE — SPONGE, HEMOSTATIC, CELLULOSE, SURGICEL, 2 X 14 IN

## (undated) DEVICE — GEL, ULTRASOUND, AQUASONIC 100, 20 GM, STERILE

## (undated) DEVICE — CASSETTE, BLOOD, PLEGIC SET

## (undated) DEVICE — MAYO TRAY, SMALL

## (undated) DEVICE — MANIFOLD, 4 PORT NEPTUNE STANDARD

## (undated) DEVICE — SUTURE, PROLENE 4-0, TAPER POINT, SH-1 BLUE 30 INCH

## (undated) DEVICE — SPONGE, GAUZE, XRAY DECT, 16 PLY, 4 X 4, W/MASTER DMT,STERILE